# Patient Record
Sex: FEMALE | Race: WHITE | NOT HISPANIC OR LATINO | Employment: FULL TIME | URBAN - METROPOLITAN AREA
[De-identification: names, ages, dates, MRNs, and addresses within clinical notes are randomized per-mention and may not be internally consistent; named-entity substitution may affect disease eponyms.]

---

## 2017-02-01 ENCOUNTER — MYC MEDICAL ADVICE (OUTPATIENT)
Dept: FAMILY MEDICINE | Facility: CLINIC | Age: 42
End: 2017-02-01

## 2017-02-03 DIAGNOSIS — Z98.84 BARIATRIC SURGERY STATUS: ICD-10-CM

## 2017-02-03 LAB
ALBUMIN SERPL-MCNC: 4.1 G/DL (ref 3.4–5)
ALP SERPL-CCNC: 44 U/L (ref 40–150)
ALT SERPL W P-5'-P-CCNC: 20 U/L (ref 0–50)
ANION GAP SERPL CALCULATED.3IONS-SCNC: 8 MMOL/L (ref 3–14)
AST SERPL W P-5'-P-CCNC: 10 U/L (ref 0–45)
BILIRUB SERPL-MCNC: 0.4 MG/DL (ref 0.2–1.3)
BUN SERPL-MCNC: 19 MG/DL (ref 7–30)
CALCIUM SERPL-MCNC: 9 MG/DL (ref 8.5–10.1)
CHLORIDE SERPL-SCNC: 110 MMOL/L (ref 94–109)
CO2 SERPL-SCNC: 28 MMOL/L (ref 20–32)
CREAT SERPL-MCNC: 0.62 MG/DL (ref 0.52–1.04)
DEPRECATED CALCIDIOL+CALCIFEROL SERPL-MC: 31 UG/L (ref 20–75)
FERRITIN SERPL-MCNC: 35 NG/ML (ref 12–150)
GFR SERPL CREATININE-BSD FRML MDRD: ABNORMAL ML/MIN/1.7M2
GLUCOSE SERPL-MCNC: 87 MG/DL (ref 70–99)
HGB BLD-MCNC: 13.5 G/DL (ref 11.7–15.7)
POTASSIUM SERPL-SCNC: 4.3 MMOL/L (ref 3.4–5.3)
PROT SERPL-MCNC: 7 G/DL (ref 6.8–8.8)
PTH-INTACT SERPL-MCNC: 52 PG/ML (ref 12–72)
SODIUM SERPL-SCNC: 146 MMOL/L (ref 133–144)
VIT B12 SERPL-MCNC: 735 PG/ML (ref 193–986)

## 2017-02-03 PROCEDURE — 80053 COMPREHEN METABOLIC PANEL: CPT | Performed by: SURGERY

## 2017-02-03 PROCEDURE — 82607 VITAMIN B-12: CPT | Performed by: SURGERY

## 2017-02-03 PROCEDURE — 85018 HEMOGLOBIN: CPT | Performed by: SURGERY

## 2017-02-03 PROCEDURE — 82306 VITAMIN D 25 HYDROXY: CPT | Performed by: SURGERY

## 2017-02-03 PROCEDURE — 82728 ASSAY OF FERRITIN: CPT | Performed by: SURGERY

## 2017-02-03 PROCEDURE — 36415 COLL VENOUS BLD VENIPUNCTURE: CPT | Performed by: SURGERY

## 2017-02-03 PROCEDURE — 83970 ASSAY OF PARATHORMONE: CPT | Performed by: SURGERY

## 2017-03-20 ENCOUNTER — RADIANT APPOINTMENT (OUTPATIENT)
Dept: GENERAL RADIOLOGY | Facility: CLINIC | Age: 42
End: 2017-03-20
Attending: FAMILY MEDICINE
Payer: COMMERCIAL

## 2017-03-20 ENCOUNTER — OFFICE VISIT (OUTPATIENT)
Dept: ORTHOPEDICS | Facility: CLINIC | Age: 42
End: 2017-03-20
Payer: COMMERCIAL

## 2017-03-20 VITALS
BODY MASS INDEX: 27.75 KG/M2 | DIASTOLIC BLOOD PRESSURE: 78 MMHG | HEIGHT: 62 IN | SYSTOLIC BLOOD PRESSURE: 125 MMHG | HEART RATE: 85 BPM | WEIGHT: 150.8 LBS

## 2017-03-20 DIAGNOSIS — M25.561 ACUTE PAIN OF RIGHT KNEE: Primary | ICD-10-CM

## 2017-03-20 DIAGNOSIS — M25.561 ACUTE PAIN OF RIGHT KNEE: ICD-10-CM

## 2017-03-20 PROCEDURE — 99213 OFFICE O/P EST LOW 20 MIN: CPT | Performed by: FAMILY MEDICINE

## 2017-03-20 PROCEDURE — 73562 X-RAY EXAM OF KNEE 3: CPT | Mod: RT | Performed by: RADIOLOGY

## 2017-03-20 RX ORDER — PREDNISONE 20 MG/1
40 TABLET ORAL DAILY
Qty: 10 TABLET | Refills: 0 | Status: SHIPPED | OUTPATIENT
Start: 2017-03-20 | End: 2017-03-25

## 2017-03-20 ASSESSMENT — PAIN SCALES - GENERAL: PAINLEVEL: SEVERE PAIN (6)

## 2017-03-20 NOTE — MR AVS SNAPSHOT
After Visit Summary   3/20/2017    Yvonne Zarco    MRN: [id number]           Patient Information     Date Of Birth          1975        Visit Information        Provider Department      3/20/2017 12:40 PM Brigido Goss, DO [facility]        Today's Diagnoses     Acute pain of right knee    -  1       Follow-ups after your visit        Additional Services     PHYSICAL THERAPY REFERRAL       Please eval and treat for right knee pain, possible Baker's cyst, popliteus tear / strain, lateral meniscus injury?                  Who to contact     If you have questions or need follow up information about today's clinic visit or your schedule please contact [facility] directly at [phone].  Normal or non-critical lab and imaging results will be communicated to you by Allozynehart, letter or phone within 4 business days after the clinic has received the results. If you do not hear from us within 7 days, please contact the clinic through Allozynehart or phone. If you have a critical or abnormal lab result, we will notify you by phone as soon as possible.  Submit refill requests through Golfsmith or call your pharmacy and they will forward the refill request to us. Please allow 3 business days for your refill to be completed.          Additional Information About Your Visit        MyChart Information     Golfsmith gives you secure access to your electronic health record. If you see a primary care provider, you can also send messages to your care team and make appointments. If you have questions, please call your primary care clinic.  If you do not have a primary care provider, please call [phone] and they will assist you.      Golfsmith is an electronic gateway that provides easy, online access to your medical records. With Golfsmith, you can request a clinic appointment, read your test results, renew a prescription or communicate with your care team.     To access your existing  "account, please contact your [facility] or call [phone] for assistance.        Care EveryWhere ID     This is your Care EveryWhere ID. This could be used by other organizations to access your [facility] medical records  [id number]        Your Vitals Were     Pulse Height BMI (Body Mass Index)             85 1.562 m (5' 1.5\") 28.03 kg/m2          Blood Pressure from Last 3 Encounters:   03/20/17 125/78   09/07/16 120/86   06/15/15 124/71    Weight from Last 3 Encounters:   03/20/17 68.4 kg (150 lb 12.8 oz)   09/07/16 65.8 kg (145 lb)   06/15/15 65 kg (143 lb 3.2 oz)              We Performed the Following     PHYSICAL THERAPY REFERRAL          Today's Medication Changes          These changes are accurate as of: 3/20/17  2:56 PM.  If you have any questions, ask your nurse or doctor.               Start taking these medicines.        Dose/Directions    predniSONE 20 MG tablet   Commonly known as:  DELTASONE   Used for:  Acute pain of right knee   Started by:  Brigido Goss, DO        Dose:  40 mg   Take 2 tablets (40 mg) by mouth daily for 5 days   Quantity:  10 tablet   Refills:  0            Where to get your medicines      These medications were sent to [facility] - [place], [place] - [address], [address]  [address], [address], [facility] [address]     Phone:  [phone]     predniSONE 20 MG tablet                Primary Care Provider Office Phone # Fax #    [facility] [phone]       [address] [address]        Thank you!     Thank you for choosing [facility]  for your care. Our goal is always to provide you with excellent care. Hearing back from our patients is one way we can continue to improve our services. Please take a few minutes to complete the written survey that you may receive in the mail after your visit with us. Thank you!      "        Your Updated Medication List - Protect others around you: Learn how to safely use, store and throw away your medicines at www.disposemymeds.org.          This list is accurate as of: 3/20/17  2:56 PM.  Always use your most recent med list.                   Brand Name Dispense Instructions for use    benzoyl peroxide 5 % Liqd     120 mL    Apply to entire face and then wash off after 5 min daily. Then apply topical clindamycin. May bleach towels.       busPIRone 5 MG tablet    BUSPAR    150 tablet    Start at 5 mg twice daily for 3 days, then 7.5 mg (1.5 tabs) twice daily for 3 days, then 10 mg (2 tabs) twice daily for 3 days, then 12.5 mg (2.5 tabs) twice daily for 3 days, then 15 mg (3 tabs) twice daily and stay at that dose       calcium Citrate-vitamin D 500-400 MG-UNIT Chew     60 tablet    Take 500 mg by mouth 2 times daily       cyanocobalamin 1000 MCG/ML injection    VITAMIN B12    3 mL    Inject 1 mL (1,000 mcg) into the muscle every 30 days       DAILY MULTIVITAMIN PO      Take  by mouth. Take 2 chewable tablets daily       esomeprazole 20 MG CR capsule    nexIUM    30 capsule    Take 1 capsule (20 mg) by mouth 2 times daily Take 30-60 minutes before eating.       order for DME     1 each    Injection Supplies for Vitamin B12: 3cc syringes w/ 27 gauge needles, 1/2 inch length       PARoxetine 40 MG tablet    PAXIL    90 tablet    Take 1 tablet (40 mg) by mouth At Bedtime       predniSONE 20 MG tablet    DELTASONE    10 tablet    Take 2 tablets (40 mg) by mouth daily for 5 days       UNJURY Powd     2 Bottle    Take 21 g by mouth daily Unjury 21 gram-100 kcal/27 gram Powder       varenicline 0.5 MG X 11 & 1 MG X 42 tablet    CHANTIX STARTING MONTH ADRI    53 tablet    Take 0.5 mg tab daily for 3 days, then 0.5 mg tab twice daily for 4 days, then 1 mg twice daily.

## 2017-03-20 NOTE — NURSING NOTE
"Yvonne Zarco's goals for this visit include:   Chief Complaint   Patient presents with     Consult     Right knee pain       She requests these members of her care team be copied on today's visit information: PCP    PCP: Rima Mccollum    Referring Provider:  Referred Self, MD  No address on file    Chief Complaint   Patient presents with     Consult     Right knee pain       Initial /78 (BP Location: Left arm, Patient Position: Chair, Cuff Size: Adult Regular)  Pulse 85  Ht 1.562 m (5' 1.5\")  Wt 68.4 kg (150 lb 12.8 oz)  BMI 28.03 kg/m2 Estimated body mass index is 28.03 kg/(m^2) as calculated from the following:    Height as of this encounter: 1.562 m (5' 1.5\").    Weight as of this encounter: 68.4 kg (150 lb 12.8 oz).  Medication Reconciliation: complete       Medication Refills: none        Kayla Burgos CMA      "

## 2017-03-20 NOTE — PROGRESS NOTES
"HISTORY OF PRESENT ILLNESS  Ms. Zarco is a pleasant 41 year old year old female who presents to clinic today with right knee pain.  Yvonne explains that her pain started about a month ago after walking about 30k steps a day on vacation in [place].  Feels a fullness in the back of the knee, throbs, especially at night.  Moderate to severe..  Stiff after she sits for too long.  Medial knee pain as well, \"cracking,\" swelling in the back of the knee.    No prior knee pain.  Timing: occurs intermittently  Modifying factors:  resting and non-use makes it better, movement and use makes it worse  Associated signs & symptoms: none  Previous similar pain: no  Additional history: as documented    MEDICAL HISTORY  Patient Active Problem List   Diagnosis     GERD (gastroesophageal reflux disease)     Bariatric surgery status     Anxiety     Tobacco use disorder       Current Outpatient Prescriptions   Medication Sig Dispense Refill     cyanocobalamin (VITAMIN B12) 1000 MCG/ML injection Inject 1 mL (1,000 mcg) into the muscle every 30 days 3 mL 3     PARoxetine (PAXIL) 40 MG tablet Take 1 tablet (40 mg) by mouth At Bedtime 90 tablet 3     esomeprazole (NEXIUM) 20 MG capsule Take 1 capsule (20 mg) by mouth 2 times daily Take 30-60 minutes before eating. 30 capsule 0     calcium Citrate-vitamin D 500-400 MG-UNIT CHEW Take 500 mg by mouth 2 times daily 60 tablet prn     Multiple Vitamin (DAILY MULTIVITAMIN PO) Take  by mouth. Take 2 chewable tablets daily       busPIRone (BUSPAR) 5 MG tablet Start at 5 mg twice daily for 3 days, then 7.5 mg (1.5 tabs) twice daily for 3 days, then 10 mg (2 tabs) twice daily for 3 days, then 12.5 mg (2.5 tabs) twice daily for 3 days, then 15 mg (3 tabs) twice daily and stay at that dose (Patient not taking: Reported on 3/20/2017) 150 tablet 0     varenicline (CHANTIX STARTING MONTH PAK) 0.5 MG X 11 & 1 MG X 42 tablet Take 0.5 mg tab daily for 3 days, then 0.5 mg tab twice daily for 4 days, then 1 mg " "twice daily. (Patient not taking: Reported on 3/20/2017) 53 tablet 3     benzoyl peroxide 5 % LIQD Apply to entire face and then wash off after 5 min daily. Then apply topical clindamycin. May bleach towels. (Patient not taking: Reported on 3/20/2017) 120 mL 11     Protein (UNJURY) POWD Take 21 g by mouth daily Unjury 21 gram-100 kcal/27 gram Powder 2 Bottle 11     ORDER FOR DME Injection Supplies for Vitamin B12: 3cc syringes w/ 27 gauge needles, 1/2 inch length (Patient not taking: Reported on 3/20/2017) 1 each 11       Allergies   Allergen Reactions     Ciprofloxacin Itching       Family History   Problem Relation Age of Onset     Hypertension Father      DIABETES Mother      Hypertension Mother      CANCER No family hx of      CEREBROVASCULAR DISEASE No family hx of      Thyroid Disease No family hx of      Glaucoma No family hx of      Macular Degeneration No family hx of      Hypertension Sister      Coronary Artery Disease Mother 67     quadruple bypass     Anxiety Disorder Sister      Anxiety Disorder Daughter      Anxiety Disorder Niece        Additional medical/Social/Surgical histories reviewed in [facility] and updated as appropriate.     REVIEW OF SYSTEMS (3/20/2017)  10 point ROS of systems including Constitutional, Eyes, Respiratory, Cardiovascular, Gastroenterology, Genitourinary, Integumentary, Musculoskeletal, Psychiatric were all negative except for pertinent positives noted in my HPI.     PHYSICAL EXAM  Vitals:    03/20/17 1242   BP: 125/78   BP Location: Left arm   Patient Position: Chair   Cuff Size: Adult Regular   Pulse: 85   Weight: 68.4 kg (150 lb 12.8 oz)   Height: 1.562 m (5' 1.5\")     General  - normal appearance, in no obvious distress  CV  - normal popliteal pulse  Pulm  - normal respiratory pattern, non-labored  Musculoskeletal - right knee  - stance: normal gait without limp, no obvious leg length discrepancy  - inspection: no effusion, no ecchymosis, normal muscle tone, normal bone and " joint alignment, no obvious deformity  - palpation: posterolateral knee tenderness, patella and patellar tendon non-tender, normal popliteal pulse  - ROM: 135 degrees flexion, -5 degrees extension, pain at terminal flexion passively  - strength: 5/5 in flexion, 5/5 in extension  - neuro: no sensory or motor deficit  - special tests:  (-) Lachman  (+) Kaila  (-) varus at 0 and 30 degrees flexion  (-) valgus at 0 and 30 degrees flexion  Neuro  - no sensory or motor deficit, grossly normal coordination, normal muscle tone  Skin  - no ecchymosis, erythema, warmth, or induration, no obvious rash  Psych  - interactive, appropriate, normal mood and affect          ASSESSMENT & PLAN  Ms. Zarco is a 41 year old year old female who is in the office today with right knee pain.    I ordered & reviewed an xray of her knee which shows mild OA.    Yvonne may have suffered a popliteus strain.  Also on my differential is a popliteal cyst or lateral meniscus injury.    We discussed the utility of an MR which, at the moment, would be unlikely to .    I'm referring her to physical therapy.    I recommend that Yvonne return to my office for a re-examination in 4-5 weeks.  She should follow up sooner if the condition worsens or if other problems arise.    It was a pleasure taking care of Yvonne.        Brigdio Goss DO, CAM

## 2017-04-21 ENCOUNTER — RADIANT APPOINTMENT (OUTPATIENT)
Dept: MAMMOGRAPHY | Facility: CLINIC | Age: 42
End: 2017-04-21
Attending: NURSE PRACTITIONER
Payer: COMMERCIAL

## 2017-04-21 DIAGNOSIS — Z12.31 VISIT FOR SCREENING MAMMOGRAM: ICD-10-CM

## 2017-04-21 PROCEDURE — 77063 BREAST TOMOSYNTHESIS BI: CPT

## 2017-04-21 PROCEDURE — G0202 SCR MAMMO BI INCL CAD: HCPCS

## 2017-08-17 ENCOUNTER — MYC MEDICAL ADVICE (OUTPATIENT)
Dept: ORTHOPEDICS | Facility: CLINIC | Age: 42
End: 2017-08-17

## 2017-08-17 DIAGNOSIS — G89.29 CHRONIC PAIN OF RIGHT KNEE: Primary | ICD-10-CM

## 2017-08-17 DIAGNOSIS — M25.561 CHRONIC PAIN OF RIGHT KNEE: Primary | ICD-10-CM

## 2017-08-29 ENCOUNTER — RADIANT APPOINTMENT (OUTPATIENT)
Dept: MRI IMAGING | Facility: CLINIC | Age: 42
End: 2017-08-29
Attending: FAMILY MEDICINE
Payer: COMMERCIAL

## 2017-08-29 DIAGNOSIS — G89.29 CHRONIC PAIN OF RIGHT KNEE: ICD-10-CM

## 2017-08-29 DIAGNOSIS — M25.561 CHRONIC PAIN OF RIGHT KNEE: ICD-10-CM

## 2017-08-29 PROCEDURE — 73721 MRI JNT OF LWR EXTRE W/O DYE: CPT | Mod: RT | Performed by: RADIOLOGY

## 2017-08-31 ENCOUNTER — OFFICE VISIT (OUTPATIENT)
Dept: ORTHOPEDICS | Facility: CLINIC | Age: 42
End: 2017-08-31
Payer: COMMERCIAL

## 2017-08-31 VITALS — OXYGEN SATURATION: 98 % | DIASTOLIC BLOOD PRESSURE: 78 MMHG | SYSTOLIC BLOOD PRESSURE: 122 MMHG | HEART RATE: 87 BPM

## 2017-08-31 DIAGNOSIS — S83.411A SPRAIN OF MEDIAL COLLATERAL LIGAMENT OF RIGHT KNEE, INITIAL ENCOUNTER: Primary | ICD-10-CM

## 2017-08-31 PROCEDURE — 99213 OFFICE O/P EST LOW 20 MIN: CPT | Performed by: FAMILY MEDICINE

## 2017-08-31 RX ORDER — CYCLOBENZAPRINE HCL 10 MG
5 TABLET ORAL
Qty: 30 TABLET | Refills: 1 | Status: SHIPPED | OUTPATIENT
Start: 2017-08-31 | End: 2017-10-25

## 2017-08-31 RX ORDER — TRAMADOL HYDROCHLORIDE 50 MG/1
25 TABLET ORAL EVERY 6 HOURS PRN
Qty: 20 TABLET | Refills: 0 | Status: SHIPPED | OUTPATIENT
Start: 2017-08-31 | End: 2017-10-25

## 2017-08-31 ASSESSMENT — PAIN SCALES - GENERAL: PAINLEVEL: MODERATE PAIN (5)

## 2017-08-31 NOTE — PROGRESS NOTES
HISTORY OF PRESENT ILLNESS  Ms. Zarco is a pleasant 42 year old year old female who presents to clinic today for follow up of her right knee pain.  She's here to review her MRI.  She continues to have medial and lateral knee pain that extends posteriorly.  Additional history: as documented      REVIEW OF SYSTEMS (8/31/2017)  10 point ROS of systems including Constitutional, Eyes, Respiratory, Cardiovascular, Gastroenterology, Genitourinary, Integumentary, Musculoskeletal, Psychiatric were all negative except for pertinent positives noted in my HPI.     PHYSICAL EXAM  Vitals:    08/31/17 0907   BP: 122/78   BP Location: Left arm   Patient Position: Chair   Cuff Size: Adult Regular   Pulse: 87   SpO2: 98%     General  - normal appearance, in no obvious distress  CV  - normal popliteal pulse  Pulm  - normal respiratory pattern, non-labored  Musculoskeletal - right knee  - stance: normal gait  Inspection: trace effusion, normal muscle tone, normal bone and joint alignment  Palpation: tender along medial and lateral aspect of the knee, patella and patellar tendon non-tender, normal popliteal pulse  Strength: 5/5 in flexion, 5/5 in extension  Neuro: no sensory or motor deficit  Special Tests:  (-) Lachman  (-) anterior drawer  (-) posterior drawer  (-) pivot shift  (-) Kaila  (-) varus  Some pain with valgus stress, no laxity    Neuro  - no sensory or motor deficit, grossly normal coordination, normal muscle tone  Skin  - no ecchymosis, erythema, warmth, or induration, no obvious rash  Psych  - interactive, appropriate, normal mood and affect          ASSESSMENT & PLAN  Ms. Zarco is a 42 year old year old female who is in the office today following up with right knee pain.    I reviewed her MRI in the room with her which reads:  1. Mild soft tissue edema surrounding the right knee tibial collateral  ligament, extending posteriorly, nonspecific, possibly representing a  low-grade sprain.  2. No full-thickness cartilage  loss.  3. The anterior and posterior cruciate ligaments, medial and lateral  supporting structures, and bilateral menisci are intact.     I gave Yvonne a hinged knee brace, she should wear this at all times when bearing weight.  She is also going to continue to ice and use heat as helpful.    I prescribed her Flexeril and tramadol, she's having some trouble sleeping due to the pain.    Yvonne should follow-up in 3 or 4 weeks if worsening or not improved.    It was a pleasure taking care of Yvonne.        Brigido Goss DO, CAQSM

## 2017-08-31 NOTE — PATIENT INSTRUCTIONS
Thanks for coming today.  Ortho/Sports Medicine Clinic  [address] [place] [address]    To schedule future appointments in Ortho Clinic, you may call [phone].    To schedule ordered imaging by your provider:   Call Central Imaging [phone]    To schedule an injection ordered by your provider:  Call Central Imaging Injection scheduling line: [phone]  [employer] available online at:  [email or web address]    Please call if any further questions or concerns ([phone]).  Clinic hours 8 am to 5 pm.    Return to clinic (call) if symptoms worsen or fail to improve.

## 2017-08-31 NOTE — NURSING NOTE
"Yvonne Zarco's goals for this visit include: Discuss MRI results of the right knee  She requests these members of her care team be copied on today's visit information: yes    PCP: Rima Mccollum    Referring Provider:  No referring provider defined for this encounter.    Chief Complaint   Patient presents with     Results     MRI right knee from 08/29/2017       Initial /78 (BP Location: Left arm, Patient Position: Chair, Cuff Size: Adult Regular)  Pulse 87  SpO2 98% Estimated body mass index is 28.03 kg/(m^2) as calculated from the following:    Height as of 3/20/17: 1.562 m (5' 1.5\").    Weight as of 3/20/17: 68.4 kg (150 lb 12.8 oz).  Medication Reconciliation: complete    "

## 2017-08-31 NOTE — MR AVS SNAPSHOT
After Visit Summary   2017    Yvonne Zarco    MRN: [id number]           Patient Information     Date Of Birth          1975        Visit Information        Provider Department      2017 9:00 AM Brigido Goss, DO [facility]        Today's Diagnoses     Sprain of medial collateral ligament of right knee, initial encounter    -  1      Care Instructions    Thanks for coming today.  Ortho/Sports Medicine Clinic  [address] [place] [address]    To schedule future appointments in Ortho Clinic, you may call [phone].    To schedule ordered imaging by your provider:   Call Central Imaging [phone]    To schedule an injection ordered by your provider:  Call Central Imaging Injection scheduling line: [phone]  [employer] available online at:  [email or web address]    Please call if any further questions or concerns ([phone]).  Clinic hours 8 am to 5 pm.    Return to clinic (call) if symptoms worsen or fail to improve.            Follow-ups after your visit        Who to contact     If you have questions or need follow up information about today's clinic visit or your schedule please contact [facility] directly at [phone].  Normal or non-critical lab and imaging results will be communicated to you by Web and Rankhart, letter or phone within 4 business days after the clinic has received the results. If you do not hear from us within 7 days, please contact the clinic through Web and Rankhart or phone. If you have a critical or abnormal lab result, we will notify you by phone as soon as possible.  Submit refill requests through [employer] or call your pharmacy and they will forward the refill request to us. Please allow 3 business days for your refill to be completed.          Additional Information About Your Visit        MyChart Information     [employer] gives you secure access to your electronic health record. If you see a primary  care provider, you can also send messages to your care team and make appointments. If you have questions, please call your primary care clinic.  If you do not have a primary care provider, please call [phone] and they will assist you.      [employer] is an electronic gateway that provides easy, online access to your medical records. With [employer], you can request a clinic appointment, read your test results, renew a prescription or communicate with your care team.     To access your existing account, please contact your [facility] or call [phone] for assistance.        Care EveryWhere ID     This is your Care EveryWhere ID. This could be used by other organizations to access your [facility] medical records  [id number]        Your Vitals Were     Pulse Pulse Oximetry                87 98%           Blood Pressure from Last 3 Encounters:   08/31/17 122/78   03/20/17 125/78   09/07/16 120/86    Weight from Last 3 Encounters:   03/20/17 68.4 kg (150 lb 12.8 oz)   09/07/16 65.8 kg (145 lb)   06/15/15 65 kg (143 lb 3.2 oz)              Today, you had the following     No orders found for display         Today's Medication Changes          These changes are accurate as of: 8/31/17  9:26 AM.  If you have any questions, ask your nurse or doctor.               Start taking these medicines.        Dose/Directions    cyclobenzaprine 10 MG tablet   Commonly known as:  FLEXERIL   Used for:  Sprain of medial collateral ligament of right knee, initial encounter        Dose:  5 mg   Take 0.5 tablets (5 mg) by mouth nightly as needed for muscle spasms   Quantity:  30 tablet   Refills:  1       traMADol 50 MG tablet   Commonly known as:  ULTRAM   Used for:  Sprain of medial collateral ligament of right knee, initial encounter        Dose:  25 mg   Take 0.5 tablets (25 mg) by mouth every 6 hours as needed for pain   Quantity:  20 tablet   Refills:  0            Where to get your medicines      These  medications were sent to [facility] - [place], [place] - [address], [address]  [address], [address], [facility] [address]     Phone:  [phone]     cyclobenzaprine 10 MG tablet         Some of these will need a paper prescription and others can be bought over the counter.  Ask your nurse if you have questions.     Bring a paper prescription for each of these medications     traMADol 50 MG tablet                Primary Care Provider Office Phone # Fax #    Rima Carolyn Mccollum, [facility] [phone] [address] [facility]  [facility] [address]        Equal Access to Services     HERLINDA QUEZADA : Hadii patrick paniagua hadasho Soomaali, waaxda luqadaha, qaybta kaalmada adeegyada, surjit lópez . So [facility] [phone].    ATENCIÓN: Si habla español, tiene a javier disposición servicios gratuitos de asistencia lingüística. Llame al [phone].    We comply with applicable federal civil rights laws and [place] laws. We do not discriminate on the basis of race, color, national origin, age, disability sex, sexual orientation or gender identity.            Thank you!     Thank you for choosing [facility]  for your care. Our goal is always to provide you with excellent care. Hearing back from our patients is one way we can continue to improve our services. Please take a few minutes to complete the written survey that you may receive in the mail after your visit with us. Thank you!             Your Updated Medication List - Protect others around you: Learn how to safely use, store and throw away your medicines at www.disposemymeds.org.          This list is accurate as of: 8/31/17  9:26 AM.  Always use your most recent med list.                   Brand Name Dispense Instructions for use Diagnosis    benzoyl peroxide 5 % Liqd     120 mL    Apply to entire face and then wash off after 5 min daily. Then apply topical clindamycin. May bleach  towels.    Acne       busPIRone 5 MG tablet    BUSPAR    150 tablet    Start at 5 mg twice daily for 3 days, then 7.5 mg (1.5 tabs) twice daily for 3 days, then 10 mg (2 tabs) twice daily for 3 days, then 12.5 mg (2.5 tabs) twice daily for 3 days, then 15 mg (3 tabs) twice daily and stay at that dose    Anxiety       calcium Citrate-vitamin D 500-400 MG-UNIT Chew     60 tablet    Take 500 mg by mouth 2 times daily    Unspecified intestinal malabsorption, Status post bariatric surgery       cyanocobalamin 1000 MCG/ML injection    VITAMIN B12    3 mL    Inject 1 mL (1,000 mcg) into the muscle every 30 days    Bariatric surgery status       cyclobenzaprine 10 MG tablet    FLEXERIL    30 tablet    Take 0.5 tablets (5 mg) by mouth nightly as needed for muscle spasms    Sprain of medial collateral ligament of right knee, initial encounter       DAILY MULTIVITAMIN PO      Take  by mouth. Take 2 chewable tablets daily    Bariatric surgery status       esomeprazole 20 MG CR capsule    nexIUM    30 capsule    Take 1 capsule (20 mg) by mouth 2 times daily Take 30-60 minutes before eating.    Gastroesophageal reflux disease, esophagitis presence not specified       order for DME     1 each    Injection Supplies for Vitamin B12: 3cc syringes w/ 27 gauge needles, 1/2 inch length    Bariatric surgery status       PARoxetine 40 MG tablet    PAXIL    90 tablet    Take 1 tablet (40 mg) by mouth At Bedtime    Anxiety       traMADol 50 MG tablet    ULTRAM    20 tablet    Take 0.5 tablets (25 mg) by mouth every 6 hours as needed for pain    Sprain of medial collateral ligament of right knee, initial encounter       UNJURY Powd     2 Bottle    Take 21 g by mouth daily Unjury 21 gram-100 kcal/27 gram Powder    Bariatric surgery status       varenicline 0.5 MG X 11 & 1 MG X 42 tablet    CHANTIX STARTING MONTH ADRI    53 tablet    Take 0.5 mg tab daily for 3 days, then 0.5 mg tab twice daily for 4 days, then 1 mg twice daily.    Tobacco use  disorder

## 2017-10-20 ENCOUNTER — MYC REFILL (OUTPATIENT)
Dept: FAMILY MEDICINE | Facility: CLINIC | Age: 42
End: 2017-10-20

## 2017-10-20 DIAGNOSIS — F41.9 ANXIETY: ICD-10-CM

## 2017-10-20 RX ORDER — PAROXETINE 40 MG/1
40 TABLET, FILM COATED ORAL AT BEDTIME
Qty: 30 TABLET | Refills: 0 | Status: SHIPPED | OUTPATIENT
Start: 2017-10-20 | End: 2017-10-25

## 2017-10-20 NOTE — TELEPHONE ENCOUNTER
Prescription approved per [facility] Refill Protocol.  Patient due for office visit for further refills.  Juliana Christine RN - BC

## 2017-10-20 NOTE — TELEPHONE ENCOUNTER
Message from itembase:  An Wright Oct 20, 2017 10:23 AM        ----- Message -----   From: Yvonne Zarco   Sent: 10/20/2017 10:14 AM   To: [facility] Team Red  Subject: Medication Renewal Request     Original authorizing provider: ISH Zurita CNP would like a refill of the following medications:  PARoxetine (PAXIL) 40 MG tablet [ISH Zurita CNP]    Preferred pharmacy: [facility] [id number] [place] [facility] [place], [place] - [address]    Comment:

## 2017-10-24 NOTE — PATIENT INSTRUCTIONS
[facility]    If you have any questions regarding to your visit please contact your care team:       Team Red:   Clinic Hours Telephone Number   Dr. Ebonie Mccollum, NP   7am-7pm  Monday - Thursday   7am-5pm  Fridays  [phone]  (Appointment scheduling available 24/7)    Questions about your visit?   Team Line  [phone]   [facility] [place] [facility] - 11am-9pm Monday-Friday Saturday-Sunday- 9am-5pm   [place] - 5pm-9pm Monday-Friday Saturday-Sunday- 9am-5pm  [phone] - [place]   [phone] - [place]       What options do I have for visits at the clinic other than the traditional office visit?  To expand how we care for you, many of our providers are utilizing electronic visits (e-visits) and telephone visits, when medically appropriate, for interactions with their patients rather than a visit in the clinic.   We also offer nurse visits for many medical concerns. Just like any other service, we will bill your insurance company for this type of visit based on time spent on the phone with your provider. Not all insurance companies cover these visits. Please check with your medical insurance if this type of visit is covered. You will be responsible for any charges that are not paid by your insurance.      E-visits via [employer]:  generally incur a $35.00 fee.  Telephone visits:  Time spent on the phone: *charged based on time that is spent on the phone in increments of 10 minutes. Estimated cost:   5-10 mins $30.00   11-20 mins. $59.00   21-30 mins. $85.00     Use Mapluckt (secure email communication and access to your chart) to send your primary care provider a message or make an appointment. Ask someone on your Team how to sign up for [employer].  For a Price Quote for your services, please call our Consumer Price Line at [phone].      As always, Thank you for trusting us with your health care needs!    Preventive  Health Recommendations  Female Ages 40 to 49    Yearly exam:     See your health care provider every year in order to  1. Review health changes.   2. Discuss preventive care.    3. Review your medicines if your doctor prescribed any.      Get a Pap test every three years (unless you have an abnormal result and your provider advises testing more often).      If you get Pap tests with HPV test, you only need to test every 5 years, unless you have an abnormal result. You do not need a Pap test if your uterus was removed (hysterectomy) and you have not had cancer.      You should be tested each year for STDs (sexually transmitted diseases), if you're at risk.       Ask your doctor if you should have a mammogram.      Have a colonoscopy (test for colon cancer) if someone in your family has had colon cancer or polyps before age 50.       Have a cholesterol test every 5 years.       Have a diabetes test (fasting glucose) after age 45. If you are at risk for diabetes, you should have this test every 3 years.    Shots: Get a flu shot each year. Get a tetanus shot every 10 years.     Nutrition:     Eat at least 5 servings of fruits and vegetables each day.    Eat whole-grain bread, whole-wheat pasta and brown rice instead of white grains and rice.    Talk to your provider about Calcium and Vitamin D.     Lifestyle    Exercise at least 150 minutes a week (an average of 30 minutes a day, 5 days a week). This will help you control your weight and prevent disease.    Limit alcohol to one drink per day.    No smoking.     Wear sunscreen to prevent skin cancer.    See your dentist every six months for an exam and cleaning.    Discharged by NATHAN Romero

## 2017-10-25 ENCOUNTER — TELEPHONE (OUTPATIENT)
Dept: FAMILY MEDICINE | Facility: CLINIC | Age: 42
End: 2017-10-25

## 2017-10-25 ENCOUNTER — OFFICE VISIT (OUTPATIENT)
Dept: FAMILY MEDICINE | Facility: CLINIC | Age: 42
End: 2017-10-25
Payer: COMMERCIAL

## 2017-10-25 VITALS
HEIGHT: 62 IN | RESPIRATION RATE: 15 BRPM | SYSTOLIC BLOOD PRESSURE: 126 MMHG | WEIGHT: 150.6 LBS | DIASTOLIC BLOOD PRESSURE: 74 MMHG | BODY MASS INDEX: 27.71 KG/M2 | OXYGEN SATURATION: 98 % | HEART RATE: 95 BPM | TEMPERATURE: 98.8 F

## 2017-10-25 DIAGNOSIS — E87.0 HYPERNATREMIA: ICD-10-CM

## 2017-10-25 DIAGNOSIS — K21.9 GASTROESOPHAGEAL REFLUX DISEASE, ESOPHAGITIS PRESENCE NOT SPECIFIED: ICD-10-CM

## 2017-10-25 DIAGNOSIS — L98.9 CHANGING SKIN LESION: ICD-10-CM

## 2017-10-25 DIAGNOSIS — F41.9 ANXIETY: ICD-10-CM

## 2017-10-25 DIAGNOSIS — Z23 NEED FOR PROPHYLACTIC VACCINATION AND INOCULATION AGAINST INFLUENZA: ICD-10-CM

## 2017-10-25 DIAGNOSIS — F17.200 TOBACCO USE DISORDER: ICD-10-CM

## 2017-10-25 DIAGNOSIS — Z00.00 ROUTINE GENERAL MEDICAL EXAMINATION AT A HEALTH CARE FACILITY: Primary | ICD-10-CM

## 2017-10-25 DIAGNOSIS — Z98.84 BARIATRIC SURGERY STATUS: ICD-10-CM

## 2017-10-25 PROCEDURE — 90471 IMMUNIZATION ADMIN: CPT | Performed by: NURSE PRACTITIONER

## 2017-10-25 PROCEDURE — 90686 IIV4 VACC NO PRSV 0.5 ML IM: CPT | Performed by: NURSE PRACTITIONER

## 2017-10-25 PROCEDURE — 80048 BASIC METABOLIC PNL TOTAL CA: CPT | Performed by: NURSE PRACTITIONER

## 2017-10-25 PROCEDURE — 99396 PREV VISIT EST AGE 40-64: CPT | Mod: 25 | Performed by: NURSE PRACTITIONER

## 2017-10-25 PROCEDURE — 36415 COLL VENOUS BLD VENIPUNCTURE: CPT | Performed by: NURSE PRACTITIONER

## 2017-10-25 RX ORDER — BUSPIRONE HYDROCHLORIDE 5 MG/1
TABLET ORAL
Qty: 150 TABLET | Refills: 0 | Status: CANCELLED | OUTPATIENT
Start: 2017-10-25

## 2017-10-25 RX ORDER — PAROXETINE 40 MG/1
20 TABLET, FILM COATED ORAL AT BEDTIME
Qty: 90 TABLET | Refills: 3 | Status: SHIPPED | OUTPATIENT
Start: 2017-10-25 | End: 2018-11-07

## 2017-10-25 RX ORDER — IRON,CARBONYL/FOLIC ACID/MV-MN 30-0.8MG
21 TABLET,CHEWABLE ORAL DAILY
Qty: 2 BOTTLE | Refills: 11 | Status: SHIPPED | OUTPATIENT
Start: 2017-10-25 | End: 2020-01-08

## 2017-10-25 RX ORDER — BUPROPION HYDROCHLORIDE 150 MG/1
TABLET, EXTENDED RELEASE ORAL
Qty: 60 TABLET | Refills: 2 | Status: SHIPPED | OUTPATIENT
Start: 2017-10-25 | End: 2018-04-14

## 2017-10-25 NOTE — MR AVS SNAPSHOT
After Visit Summary   10/25/2017    Yvonne Zarco    MRN: [id number]           Patient Information     Date Of Birth          1975        Visit Information        Provider Department      10/25/2017 4:00 PM Rima Mccollum APRN CNP [facility]        Today's Diagnoses     Routine general medical examination at a health care facility    -  1    Anxiety        Gastroesophageal reflux disease, esophagitis presence not specified        Tobacco use disorder        Need for prophylactic vaccination and inoculation against influenza        Bariatric surgery status        Hypernatremia        Changing skin lesion          Care Instructions    [facility]    If you have any questions regarding to your visit please contact your care team:       Team Red:   Clinic Hours Telephone Number   Dr. Ebonie Mccollum, NP   7am-7pm  Monday - Thursday   7am-5pm  Fridays  [phone]  (Appointment scheduling available 24/7)    Questions about your visit?   Team Line  [phone]   Urgent Care - [place] and [place] - 11am-9pm Monday-Friday Saturday-Sunday- 9am-5pm   [place] - 5pm-9pm Monday-Friday Saturday-Sunday- 9am-5pm  [phone] - [place]   [phone] - [place]       What options do I have for visits at the clinic other than the traditional office visit?  To expand how we care for you, many of our providers are utilizing electronic visits (e-visits) and telephone visits, when medically appropriate, for interactions with their patients rather than a visit in the clinic.   We also offer nurse visits for many medical concerns. Just like any other service, we will bill your insurance company for this type of visit based on time spent on the phone with your provider. Not all insurance companies cover these visits. Please check with your medical insurance if this type of visit is covered. You will be  responsible for any charges that are not paid by your insurance.      E-visits via [employer]:  generally incur a $35.00 fee.  Telephone visits:  Time spent on the phone: *charged based on time that is spent on the phone in increments of 10 minutes. Estimated cost:   5-10 mins $30.00   11-20 mins. $59.00   21-30 mins. $85.00     Use [employer] (secure email communication and access to your chart) to send your primary care provider a message or make an appointment. Ask someone on your Team how to sign up for [employer].  For a Price Quote for your services, please call our [employer] Line at [phone].      As always, Thank you for trusting us with your health care needs!    Preventive Health Recommendations  Female Ages 40 to 49    Yearly exam:     See your health care provider every year in order to  1. Review health changes.   2. Discuss preventive care.    3. Review your medicines if your doctor prescribed any.      Get a Pap test every three years (unless you have an abnormal result and your provider advises testing more often).      If you get Pap tests with HPV test, you only need to test every 5 years, unless you have an abnormal result. You do not need a Pap test if your uterus was removed (hysterectomy) and you have not had cancer.      You should be tested each year for STDs (sexually transmitted diseases), if you're at risk.       Ask your doctor if you should have a mammogram.      Have a colonoscopy (test for colon cancer) if someone in your family has had colon cancer or polyps before age 50.       Have a cholesterol test every 5 years.       Have a diabetes test (fasting glucose) after age 45. If you are at risk for diabetes, you should have this test every 3 years.    Shots: Get a flu shot each year. Get a tetanus shot every 10 years.     Nutrition:     Eat at least 5 servings of fruits and vegetables each day.    Eat whole-grain bread, whole-wheat pasta and brown rice instead of white grains and  "rice.    Talk to your provider about Calcium and Vitamin D.     Lifestyle    Exercise at least 150 minutes a week (an average of 30 minutes a day, 5 days a week). This will help you control your weight and prevent disease.    Limit alcohol to one drink per day.    No smoking.     Wear sunscreen to prevent skin cancer.    See your dentist every six months for an exam and cleaning.    Discharged by NATHAN Romero            Follow-ups after your visit        Who to contact     If you have questions or need follow up information about today's clinic visit or your schedule please contact [facility] directly at [phone].  Normal or non-critical lab and imaging results will be communicated to you by [employer], letter or phone within 4 business days after the clinic has received the results. If you do not hear from us within 7 days, please contact the clinic through [employer] or phone. If you have a critical or abnormal lab result, we will notify you by phone as soon as possible.  Submit refill requests through [employer] or call your pharmacy and they will forward the refill request to us. Please allow 3 business days for your refill to be completed.          Additional Information About Your Visit        [employer] Information     Amorelie gives you secure access to your electronic health record. If you see a primary care provider, you can also send messages to your care team and make appointments. If you have questions, please call your primary care clinic.  If you do not have a primary care provider, please call [phone] and they will assist you.        Care EveryWhere ID     This is your Care EveryWhere ID. This could be used by other organizations to access your [place] medical records  [id number]        Your Vitals Were     Pulse Temperature Respirations Height Pulse Oximetry Breastfeeding?    95 98.8  F (37.1  C) (Oral) 15 5' 1.5\" (1.562 m) 98% No    BMI (Body Mass Index)                   27.99 kg/m2    "         Blood Pressure from Last 3 Encounters:   10/25/17 126/74   08/31/17 122/78   03/20/17 125/78    Weight from Last 3 Encounters:   10/25/17 150 lb 9.6 oz (68.3 kg)   03/20/17 150 lb 12.8 oz (68.4 kg)   09/07/16 145 lb (65.8 kg)              We Performed the Following     Basic metabolic panel          Today's Medication Changes          These changes are accurate as of: 10/25/17  4:48 PM.  If you have any questions, ask your nurse or doctor.               Start taking these medicines.        Dose/Directions    buPROPion 150 MG 12 hr tablet   Commonly known as:  WELLBUTRIN SR   Used for:  Tobacco use disorder   Started by:  Rima Mccollum APRN CNP        Take 1 tablet once daily and increase to 1 tablet twice daily after 4 to 7 days   Quantity:  60 tablet   Refills:  2         These medicines have changed or have updated prescriptions.        Dose/Directions    PARoxetine 40 MG tablet   Commonly known as:  PAXIL   This may have changed:  how much to take   Used for:  Anxiety   Changed by:  Rima Mccollum APRN CNP        Dose:  20 mg   Take 0.5 tablets (20 mg) by mouth At Bedtime   Quantity:  90 tablet   Refills:  3            Where to get your medicines      These medications were sent to [id number]     Phone:  [phone]     [facility] [place]         These medications were sent to [address] [address]     Phone:  [phone]     buPROPion 150 MG 12 hr tablet    esomeprazole 20 MG CR capsule    PARoxetine 40 MG tablet                Primary Care Provider Office Phone # Fax #    ISH Zurita [phone]       [address] [place] [address]        Equal Access to Services     JAGDISH QUEZADA AH: David Ferrer, waaxda luqneal, qaybsurjit bryant  miko trottertank donnapatricia la'aan ah. So [facility] [phone].    ATENCIÓN: Si sheela spear, tiene a javier disposición servicios gratuitos de asistencia lingüística. Masha al [phone].    We comply with applicable federal civil rights laws and [place] laws. We do not discriminate on the basis of race, color, national origin, age, disability, sex, sexual orientation, or gender identity.            Thank you!     Thank you for choosing [facility]  for your care. Our goal is always to provide you with excellent care. Hearing back from our patients is one way we can continue to improve our services. Please take a few minutes to complete the written survey that you may receive in the mail after your visit with us. Thank you!             Your Updated Medication List - Protect others around you: Learn how to safely use, store and throw away your medicines at www.disposemymeds.org.          This list is accurate as of: 10/25/17  4:48 PM.  Always use your most recent med list.                   Brand Name Dispense Instructions for use Diagnosis    benzoyl peroxide 5 % Liqd     120 mL    Apply to entire face and then wash off after 5 min daily. Then apply topical clindamycin. May bleach towels.    Acne       buPROPion 150 MG 12 hr tablet    WELLBUTRIN SR    60 tablet    Take 1 tablet once daily and increase to 1 tablet twice daily after 4 to 7 days    Tobacco use disorder       calcium Citrate-vitamin D 500-400 MG-UNIT Chew     60 tablet    Take 500 mg by mouth 2 times daily    Unspecified intestinal malabsorption, Status post bariatric surgery       cyanocobalamin 1000 MCG/ML injection    VITAMIN B12    3 mL    Inject 1 mL (1,000 mcg) into the muscle every 30 days    Bariatric surgery status       DAILY MULTIVITAMIN PO      Take  by mouth. Take 2 chewable tablets daily    Bariatric surgery status       esomeprazole 20 MG CR capsule    nexIUM    30 capsule    Take 1 capsule (20 mg) by mouth 2 times daily Take  30-60 minutes before eating.    Gastroesophageal reflux disease, esophagitis presence not specified       order for DME     1 each    Injection Supplies for Vitamin B12: 3cc syringes w/ 27 gauge needles, 1/2 inch length    Bariatric surgery status       PARoxetine 40 MG tablet    PAXIL    90 tablet    Take 0.5 tablets (20 mg) by mouth At Bedtime    Anxiety       UNJURY Powd     2 Bottle    Take 21 g by mouth daily Unjury 21 gram-100 kcal/27 gram Powder    Bariatric surgery status

## 2017-10-25 NOTE — NURSING NOTE
"Chief Complaint   Patient presents with     Physical     Recheck Medication     update        Initial /74 (BP Location: Right arm, Patient Position: Chair, Cuff Size: Adult Regular)  Pulse 95  Temp 98.8  F (37.1  C) (Oral)  Resp 15  Ht 5' 1.5\" (1.562 m)  Wt 150 lb 9.6 oz (68.3 kg)  SpO2 98%  Breastfeeding? No  BMI 27.99 kg/m2 Estimated body mass index is 27.99 kg/(m^2) as calculated from the following:    Height as of this encounter: 5' 1.5\" (1.562 m).    Weight as of this encounter: 150 lb 9.6 oz (68.3 kg).  Medication Reconciliation: complete     Chas Valdes      "

## 2017-10-25 NOTE — PROGRESS NOTES
SUBJECTIVE:   CC: Yvonne Zarco is an 42 year old woman who presents for preventive health visit.     Physical   Annual:     Getting at least 3 servings of Calcium per day::  NO    Bi-annual eye exam::  Yes    Dental care twice a year::  Yes    Sleep apnea or symptoms of sleep apnea::  None    Diet::  Other    Frequency of exercise::  2-3 days/week    Duration of exercise::  15-30 minutes    Taking medications regularly::  Yes    Medication side effects::  Not applicable    Concerns: none       Today's PHQ-2 Score: PHQ-2 ( 1999 Pfizer) 10/20/2017   Q1: Little interest or pleasure in doing things 0   Q2: Feeling down, depressed or hopeless 0   PHQ-2 Score 0   Q1: Little interest or pleasure in doing things Not at all   Q2: Feeling down, depressed or hopeless Not at all   PHQ-2 Score 0       Abuse: Current or Past(Physical, Sexual or Emotional)- No  Do you feel safe in your environment - Yes    Social History   Substance Use Topics     Smoking status: Current Every Day Smoker     Packs/day: 0.50     Years: 25.00     Types: Cigarettes     Smokeless tobacco: Never Used      Comment: On chantix     Alcohol use No     The patient does not drink >3 drinks per day nor >7 drinks per week.    Reviewed orders with patient.  Reviewed health maintenance and updated orders accordingly - Yes  Chas Valdes    Labs reviewed in EPIC    Patient under age 50, mutual decision reflected in health maintenance.        Pertinent mammograms are reviewed under the imaging tab.  History of abnormal Pap smear: Status post benign hysterectomy. Health Maintenance and Surgical History updated.    Reviewed and updated as needed this visit by clinical staff         Reviewed and updated as needed this visit by Provider          Review of Systems  C: NEGATIVE for fever, chills, change in weight  I: Several moles- the one on the thigh is itchy and peeling off on occasion  E: NEGATIVE for vision changes or irritation  ENT: NEGATIVE for ear,  "mouth and throat problems  R: NEGATIVE for significant cough or SOB  B: NEGATIVE for masses, tenderness or discharge  CV: NEGATIVE for chest pain, palpitations or peripheral edema  GI: History of GERD and s/p hiatal hernia repair, stable on nexium  : NEGATIVE for unusual urinary or vaginal symptoms. Periods are regular.  M: NEGATIVE for significant arthralgias or myalgia  N: NEGATIVE for weakness, dizziness or paresthesias  PSYCHIATRIC: History of anxiety well controlled on Paxil     OBJECTIVE:   /74 (BP Location: Right arm, Patient Position: Chair, Cuff Size: Adult Regular)  Pulse 95  Temp 98.8  F (37.1  C) (Oral)  Resp 15  Ht 5' 1.5\" (1.562 m)  Wt 150 lb 9.6 oz (68.3 kg)  SpO2 98%  Breastfeeding? No  BMI 27.99 kg/m2  Physical Exam  GENERAL: healthy, alert and no distress  EYES: Eyes grossly normal to inspection, PERRL and conjunctivae and sclerae normal  HENT: ear canals and TM's normal, nose and mouth without ulcers or lesions  NECK: no adenopathy, no asymmetry, masses, or scars and thyroid normal to palpation  RESP: lungs clear to auscultation - no rales, rhonchi or wheezes  BREAST: normal without masses, tenderness or nipple discharge and no palpable axillary masses or adenopathy  CV: regular rate and rhythm, normal S1 S2, no S3 or S4, no murmur, click or rub, no peripheral edema and peripheral pulses strong  ABDOMEN: soft, nontender, no hepatosplenomegaly, no masses and bowel sounds normal  MS: no gross musculoskeletal defects noted, no edema  SKIN: 5 mm pink smooth papule right thigh, smaller lesions left upper extremity, right upper extremity, left medial thigh  NEURO: Normal strength and tone, mentation intact and speech normal  PSYCH: mentation appears normal, affect normal/bright    ASSESSMENT/PLAN:   1. Routine general medical examination at a health care facility      2. Anxiety  Stable, continue meds without change  - PARoxetine (PAXIL) 40 MG tablet; Take 0.5 tablets (20 mg) by mouth At " "Bedtime  Dispense: 90 tablet; Refill: 3    3. Gastroesophageal reflux disease, esophagitis presence not specified  Stable on medications, continue without change  - esomeprazole (NEXIUM) 20 MG CR capsule; Take 1 capsule (20 mg) by mouth 2 times daily Take 30-60 minutes before eating.  Dispense: 30 capsule; Refill: 0    4. Tobacco use disorder  Side effects on Chantix- will try Zyban. Counseled regarding cessation strategies  - buPROPion (WELLBUTRIN SR) 150 MG 12 hr tablet; Take 1 tablet once daily and increase to 1 tablet twice daily after 4 to 7 days  Dispense: 60 tablet; Refill: 2    5. Need for prophylactic vaccination and inoculation against influenza    - FLU VACCINE, 3 YRS +, IM (QUADRIVALENT W/PRESERVATIVES/MULTI-DOSE) [48285]  - Vaccine Administration, Initial [60110]    6. Bariatric surgery status  Follows with bariatric surgeon  - Protein (UNJURY) POWD; Take 21 g by mouth daily Unjury 21 gram-100 kcal/27 gram Powder  Dispense: 2 Bottle; Refill: 11    7. Hypernatremia    - Basic metabolic panel    8. Changing skin lesion  Suspect dermatofibroma but recommend biopsy of largest lesion due to itching     COUNSELING:  Reviewed preventive health counseling, as reflected in patient instructions       Regular exercise       Healthy diet/nutrition       Osteoporosis Prevention/Bone Health         reports that she has been smoking Cigarettes.  She has a 12.50 pack-year smoking history. She has never used smokeless tobacco.  Tobacco Cessation Action Plan: Pharmacotherapies : Zyban/Wellbutrin  Estimated body mass index is 28.03 kg/(m^2) as calculated from the following:    Height as of 3/20/17: 5' 1.5\" (1.562 m).    Weight as of 3/20/17: 150 lb 12.8 oz (68.4 kg).   Weight management plan: Patient referred to endocrine and/or weight management specialty    Counseling Resources:  ATP IV Guidelines  Pooled Cohorts Equation Calculator  Breast Cancer Risk Calculator  FRAX Risk Assessment  ICSI Preventive " Guidelines  Dietary Guidelines for Americans, 2010  USDA's MyPlate  ASA Prophylaxis  Lung CA Screening    ISH Zurita [facility] [place]  Answers for HPI/ROS submitted by the patient on 10/20/2017   PHQ-2 Score: 0    Injectable Influenza Immunization Documentation    1.  Is the person to be vaccinated sick today?   No    2. Does the person to be vaccinated have an allergy to a component   of the vaccine?   No  Egg Allergy Algorithm Link    3. Has the person to be vaccinated ever had a serious reaction   to influenza vaccine in the past?   No    4. Has the person to be vaccinated ever had Guillain-Barré syndrome?   No    Form completed by Makeda Ferris CMA ([facility])

## 2017-10-25 NOTE — TELEPHONE ENCOUNTER
Reason for Call:  Other prescription    Detailed comments: Deepika is wondering if s omeprazole script can instead be a script for omeprazole due to price as insurance does not cover the S omeprazole. Please call to discuss.     Phone Number Patient can be reached at: Other phone number: [phone]    Best Time: any    Can we leave a detailed message on this number? YES    Call taken on 10/25/2017 at 5:09 PM by Wenceslao Singh

## 2017-10-26 ENCOUNTER — MYC MEDICAL ADVICE (OUTPATIENT)
Dept: FAMILY MEDICINE | Facility: CLINIC | Age: 42
End: 2017-10-26

## 2017-10-26 DIAGNOSIS — K21.9 GASTROESOPHAGEAL REFLUX DISEASE, ESOPHAGITIS PRESENCE NOT SPECIFIED: ICD-10-CM

## 2017-10-26 LAB
ANION GAP SERPL CALCULATED.3IONS-SCNC: 2 MMOL/L (ref 3–14)
BUN SERPL-MCNC: 17 MG/DL (ref 7–30)
CALCIUM SERPL-MCNC: 8.4 MG/DL (ref 8.5–10.1)
CHLORIDE SERPL-SCNC: 110 MMOL/L (ref 94–109)
CO2 SERPL-SCNC: 31 MMOL/L (ref 20–32)
CREAT SERPL-MCNC: 0.69 MG/DL (ref 0.52–1.04)
GFR SERPL CREATININE-BSD FRML MDRD: >90 ML/MIN/1.7M2
GLUCOSE SERPL-MCNC: 77 MG/DL (ref 70–99)
POTASSIUM SERPL-SCNC: 4.4 MMOL/L (ref 3.4–5.3)
SODIUM SERPL-SCNC: 143 MMOL/L (ref 133–144)

## 2017-11-16 ENCOUNTER — OFFICE VISIT (OUTPATIENT)
Dept: OPHTHALMOLOGY | Facility: CLINIC | Age: 42
End: 2017-11-16
Payer: COMMERCIAL

## 2017-11-16 DIAGNOSIS — T26.91XA CHEMICAL INSULT, EYE, RIGHT, INITIAL ENCOUNTER: Primary | ICD-10-CM

## 2017-11-16 PROCEDURE — 92002 INTRM OPH EXAM NEW PATIENT: CPT | Performed by: OPHTHALMOLOGY

## 2017-11-16 ASSESSMENT — VISUAL ACUITY
OS_CC: 20/20
METHOD: SNELLEN - LINEAR
OD_CC: 20/25

## 2017-11-16 ASSESSMENT — SLIT LAMP EXAM - LIDS
COMMENTS: NORMAL
COMMENTS: NORMAL

## 2017-11-16 NOTE — MR AVS SNAPSHOT
After Visit Summary   11/16/2017    Yvonne Zarco    MRN: [id number]           Patient Information     Date Of Birth          1975        Visit Information        Provider Department      11/16/2017 9:00 AM Flip Gar MD [facility]        Care Instructions    Continue Tobramycin three times daily thru the end of the weekend right eye   Continue Genteal drops  Cold packs twice daily   Return visit as needed     Flip Gar M.D.            Follow-ups after your visit        Your next 10 appointments already scheduled     Nov 29, 2017  4:40 PM CST   Office Visit with ISH Zurita CNP   [facility] ([facility])    [address] [address] [phone]           Bring a current list of meds and any records pertaining to this visit. For Physicals, please bring immunization records and any forms needing to be filled out. Please arrive 10 minutes early to complete paperwork.              Who to contact     If you have questions or need follow up information about today's clinic visit or your schedule please contact [facility] directly at [phone].  Normal or non-critical lab and imaging results will be communicated to you by MyChart, letter or phone within 4 business days after the clinic has received the results. If you do not hear from us within 7 days, please contact the clinic through nap- Naturally Attached Parentshart or phone. If you have a critical or abnormal lab result, we will notify you by phone as soon as possible.  Submit refill requests through [email or web address] or call your pharmacy and they will forward the refill request to us. Please allow 3 business days for your refill to be completed.          Additional Information About Your Visit        MyChart Information     [email or web address] gives you secure access to your electronic health record. If you see a primary care provider, you can also send messages to your care team and  make appointments. If you have questions, please call your primary care clinic.  If you do not have a primary care provider, please call [phone] and they will assist you.        Care EveryWhere ID     This is your Care EveryWhere ID. This could be used by other organizations to access your [place] medical records  [id number]         Blood Pressure from Last 3 Encounters:   10/25/17 126/74   08/31/17 122/78   03/20/17 125/78    Weight from Last 3 Encounters:   10/25/17 68.3 kg (150 lb 9.6 oz)   03/20/17 68.4 kg (150 lb 12.8 oz)   09/07/16 65.8 kg (145 lb)              Today, you had the following     No orders found for display       Primary Care Provider Office Phone # Fax #    Rima ISH Reyes [facility] [phone]       [address] [address]        Equal Access to Services     HERLINDA QUEZADA : Hadii aad ku hadasho Soomaali, waaxda luqadaha, qaybta kaalmada adeegyada, waxay idiin hayaan erica khpatricia lópez . So [facility] [phone].    ATENCIÓN: Si habla español, tiene a javier disposición servicios gratuitos de asistencia lingüística. Llame al [phone].    We comply with applicable federal civil rights laws and [place] laws. We do not discriminate on the basis of race, color, national origin, age, disability, sex, sexual orientation, or gender identity.            Thank you!     Thank you for choosing [facility]  for your care. Our goal is always to provide you with excellent care. Hearing back from our patients is one way we can continue to improve our services. Please take a few minutes to complete the written survey that you may receive in the mail after your visit with us. Thank you!             Your Updated Medication List - Protect others around you: Learn how to safely use, store and throw away your medicines at www.disposemymeds.org.          This list is accurate as of: 11/16/17  9:48 AM.  Always use your most recent med list.                    Brand Name Dispense Instructions for use Diagnosis    benzoyl peroxide 5 % Liqd     120 mL    Apply to entire face and then wash off after 5 min daily. Then apply topical clindamycin. May bleach towels.    Acne       buPROPion 150 MG 12 hr tablet    WELLBUTRIN SR    60 tablet    Take 1 tablet once daily and increase to 1 tablet twice daily after 4 to 7 days    Tobacco use disorder       calcium Citrate-vitamin D 500-400 MG-UNIT Chew     60 tablet    Take 500 mg by mouth 2 times daily    Unspecified intestinal malabsorption, Status post bariatric surgery       cyanocobalamin 1000 MCG/ML injection    VITAMIN B12    3 mL    Inject 1 mL (1,000 mcg) into the muscle every 30 days    Bariatric surgery status       DAILY MULTIVITAMIN PO      Take  by mouth. Take 2 chewable tablets daily    Bariatric surgery status       esomeprazole 20 MG CR capsule    nexIUM    84 capsule    Take 1 capsule (20 mg) by mouth 2 times daily Take 30-60 minutes before eating.    Gastroesophageal reflux disease, esophagitis presence not specified       order for DME     1 each    Injection Supplies for Vitamin B12: 3cc syringes w/ 27 gauge needles, 1/2 inch length    Bariatric surgery status       PARoxetine 40 MG tablet    PAXIL    90 tablet    Take 0.5 tablets (20 mg) by mouth At Bedtime    Anxiety       UNJURY Powd     2 Bottle    Take 21 g by mouth daily Unjury 21 gram-100 kcal/27 gram Powder    Bariatric surgery status

## 2017-11-16 NOTE — PATIENT INSTRUCTIONS
Continue Tobramycin three times daily thru the end of the weekend right eye.   Continue Genteal drops.  Cold packs twice daily.   Return visit as needed.     Flip Gar M.D.

## 2017-11-16 NOTE — PROGRESS NOTES
Current Eye Medications:  Tobramycin every 4 hours right eye. Last @ 8am      Subjective:  Follow up from ER 11/15. Patient accidentally put compound W in right eye (thought it was  genteal) ER flushed right eye with IV solution and gave antibiotic (Tobramycin). Red, Gritty,scratchy, goopy right eye. NO VA concerns. No flashes and floaters.      Objective:  See Ophthalmology Exam.       Assessment:    Chemical ocular injury right eye - mild.      Plan:  Continue Tobramycin three times daily thru the end of the weekend right eye.   Continue Genteal drops.  Cold packs twice daily.   Return visit as needed.     Flip Gar M.D.

## 2018-01-08 ENCOUNTER — RADIANT APPOINTMENT (OUTPATIENT)
Dept: GENERAL RADIOLOGY | Facility: CLINIC | Age: 43
End: 2018-01-08
Attending: PEDIATRICS
Payer: COMMERCIAL

## 2018-01-08 ENCOUNTER — OFFICE VISIT (OUTPATIENT)
Dept: ORTHOPEDICS | Facility: CLINIC | Age: 43
End: 2018-01-08
Payer: COMMERCIAL

## 2018-01-08 VITALS
HEIGHT: 61 IN | BODY MASS INDEX: 29.04 KG/M2 | DIASTOLIC BLOOD PRESSURE: 80 MMHG | WEIGHT: 153.8 LBS | SYSTOLIC BLOOD PRESSURE: 129 MMHG | HEART RATE: 96 BPM

## 2018-01-08 DIAGNOSIS — S89.92XA INJURY OF LEFT KNEE, INITIAL ENCOUNTER: ICD-10-CM

## 2018-01-08 DIAGNOSIS — S79.912A INJURY OF LEFT HIP, INITIAL ENCOUNTER: ICD-10-CM

## 2018-01-08 DIAGNOSIS — M25.552 PAIN OF LEFT HIP JOINT: Primary | ICD-10-CM

## 2018-01-08 DIAGNOSIS — M25.562 ACUTE PAIN OF LEFT KNEE: ICD-10-CM

## 2018-01-08 PROCEDURE — 99203 OFFICE O/P NEW LOW 30 MIN: CPT | Performed by: PEDIATRICS

## 2018-01-08 PROCEDURE — 73502 X-RAY EXAM HIP UNI 2-3 VIEWS: CPT

## 2018-01-08 PROCEDURE — 73562 X-RAY EXAM OF KNEE 3: CPT

## 2018-01-08 NOTE — PROGRESS NOTES
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    Injury: Patient reports a fall a couple weeks ago 12/24/17.  She fell onto her left side.  Had some pain then, however, noticed worsening groin pain and some episodes of sharp pain and weakness in that leg when playing boot hockey and ice fishing over the next 2 weeks.  Pain is in her groin and shoots down to her knee, worse with getting up from seated position and stairs.  - History of low back pain, not much currently.  No numbness or tingling    Location of Pain: lateral and anterior hip, lateral thigh, lateral knee, weakness in left hip movements  Duration of Pain: 2 week(s)  Rating of Pain at worst: 5/10  Rating of Pain Currently: 1/10  Symptoms are better with: Other medications: flexeril, excedrin  Symptoms are worse with: standing up, stairs, with resistance to hip motions  Additional Features:   Positive: bruising, weakness   Negative: swelling, popping, grinding, catching, locking, instability, paresthesias, numbness, pain in other joints and systemic symptoms  Other evaluation and/or treatments so far consists of: No Treatment tried to date  Prior History of related problems: falls once a month over last year or so.    Social History: desk job    Review of Systems  Skin: yes lateral hip bruising, no swelling  Musculoskeletal: as above  Neurologic: no numbness, paresthesias  Remainder of review of systems is negative including constitutional, CV, pulmonary, GI, except as noted in HPI or medical history.    Patient's current problem list, past medical and surgical history, and family history were reviewed.    Patient Active Problem List   Diagnosis     GERD (gastroesophageal reflux disease)     Bariatric surgery status     Anxiety     Tobacco use disorder     Past Medical History:   Diagnosis Date     Depressive disorder forever      "Gastro-oesophageal reflux disease      Hiatal hernia      Hypertension      Past Surgical History:   Procedure Laterality Date      SECTION      x2     CHOLECYSTECTOMY       ESOPHAGOSCOPY, GASTROSCOPY, DUODENOSCOPY (EGD), COMBINED  2012    Procedure: COMBINED ESOPHAGOSCOPY, GASTROSCOPY, DUODENOSCOPY (EGD), BIOPSY SINGLE OR MULTIPLE;;  Surgeon: Scott Seth MD;  Location:  GI     GENITOURINARY SURGERY  ??    bladder sling with hysterectomy early      GYN SURGERY      tubal ligation     hysterectomy with bladder sling      left ovaries     HYSTERECTOMY, PAP NO LONGER INDICATED       LAPAROSCOPIC GASTRIC SLEEVE  5/10/2013    Procedure: LAPAROSCOPIC GASTRIC SLEEVE;  Laparoscopic Sleeve Gastrectomy with hiatal hernia repair;  Surgeon: Scott Seth MD;  Location:  OR     [facility] BILATERAL       ORTHOPEDIC SURGERY      right foot surgery     Family History   Problem Relation Age of Onset     Hypertension Father           DIABETES Mother      diet controlled     Hypertension Mother      meds     Coronary Artery Disease Mother 67     quadruple bypass     Hypertension Sister      Anxiety Disorder Sister      Anxiety Disorder Daughter      Anxiety Disorder Niece      Hypertension Sister      meds     Anxiety Disorder Sister      CANCER No family hx of      CEREBROVASCULAR DISEASE No family hx of      Thyroid Disease No family hx of      Glaucoma No family hx of      Macular Degeneration No family hx of        Objective  /80  Pulse 96  Ht 5' 1.25\" (1.556 m)  Wt 153 lb 12.8 oz (69.8 kg)  BMI 28.82 kg/m2    GENERAL APPEARANCE: healthy, alert and no distress   GAIT: NORMAL  SKIN: no suspicious lesions or rashes  HEENT: Sclera clear, anicteric  CV: good peripheral pulses  RESP: Breathing not labored  NEURO: Normal strength and tone, mentation intact and speech normal  PSYCH:  mentation appears normal and affect normal/bright    Bilateral hip exam    Inspection:      " ecchymosis mild lateral thigh    Tender:      none bilateral    Non Tender:      remainder of the hip area bilateral    ROM:     Full active and passive ROM  bilateral    Strength:      flexion 4/5 left       abduction 5/5 bilateral       adduction 5/5 bilateral    Sensation:      grossly intact in hip and thigh    Special Tests:      neg (-) WILL left       neg (-) FADIR left       neg (-) scour left      Bilateral Knee exam  Inspection:      One spot of lateral bruising left    Patella:      Normal patellar tracking noted through range of motion bilateral    Tender:      none    Non Tender:      remainder of knee area bilateral    Knee ROM:      Full active and passive ROM with flexion and extension bilateral    Hip ROM:     Full active and passive ROM bilateral    Strength:      5/5 with knee extension bilateral    Special Tests:     neg (-) Kaila left       neg (-) Lachmans left       neg (-) anterior drawer left       neg (-) posterior drawer left       neg (-) varus at 0 deg and 30 deg left       neg (-) valgus at 0 deg and 30 deg left    Neurovascular:      2+ peripheral pulses bilaterally and brisk capillary refill       sensation grossly intact    Radiology  I ordered, visualized and reviewed these images with the patient  PELVIS AND LEFT HIP ONE VIEW  1/8/2018 1:46 PM  HISTORY:  Left hip pain after injury.  COMPARISON:  None.  FINDINGS:  No fracture or osseous lesion is seen. The joint spaces are  well preserved. No adjacent soft tissue pathology is seen.  IMPRESSION:  Unremarkable examination.    AP and sunrise bilateral and left lateral XR views of knees reviewed: no acute bony abnormality, no significant degenerative change  - will follow official read    Assessment:  1. Pain of left hip joint    2. Acute pain of left knee      Likely functional pain, hip flexor strain related to fall.  We discussed continued supportive care and physical therapy.  Less likely lumbar etiology.  Would consider imaging  pending clinical course.    Plan:  - Today's Plan of Care:  Rehab: Physical Therapy: [facility] - [phone]    -We also discussed other future treatment options:  MRI    Follow Up: 1 month    Concerning signs and symptoms were reviewed.  The patient expressed understanding of this management plan and all questions were answered at this time.    Maritza Morin MD CAQ  Primary Care Sports Medicine  [facility]

## 2018-01-08 NOTE — MR AVS SNAPSHOT
After Visit Summary   1/8/2018    Yvonne aZrco    MRN: [id number]           Patient Information     Date Of Birth          1975        Visit Information        Provider Department      1/8/2018 1:00 PM Maritza Morin MD [facility]        Today's Diagnoses     Pain of left hip joint    -  1    Injury of left knee, initial encounter          Care Instructions    Plan:  - Today's Plan of Care:  Rehab: Physical Therapy: [place] [facility] - [phone]    -We also discussed other future treatment options:  MRI    Follow Up: 1 month              Follow-ups after your visit        Additional Services     [facility] PT, HAND, AND CHIROPRACTIC REFERRAL       **This order will print in the [facility] Scheduling Office**    Physical Therapy, Hand Therapy and Chiropractic Care are available through:    *[facility]  *[facility]  *[facility]    Call one number to schedule at any of the above locations: [phone].    Your provider has referred you to: Physical Therapy at [facility] or [facility]    Indication/Reason for Referral: Hip Pain and Knee Pain  Onset of Illness: ~2 weeks  Therapy Orders: Evaluate and Treat  Special Programs: None  Special Request: None    Additional Comments for the Therapist or Chiropractor: None    Please be aware that coverage of these services is subject to the terms and limitations of your health insurance plan.  Call member services at your health plan with any benefit or coverage questions.      Please bring the following to your appointment:    *Your personal calendar for scheduling future appointments  *Comfortable clothing                  Who to contact     If you have questions or need follow up information about today's clinic visit or your schedule please contact [facility] directly at [phone].  Normal or non-critical lab and imaging results will be  "communicated to you by AnyCloudhart, letter or phone within 4 business days after the clinic has received the results. If you do not hear from us within 7 days, please contact the clinic through VALOREM or phone. If you have a critical or abnormal lab result, we will notify you by phone as soon as possible.  Submit refill requests through VALOREM or call your pharmacy and they will forward the refill request to us. Please allow 3 business days for your refill to be completed.          Additional Information About Your Visit        VALOREM Information     VALOREM gives you secure access to your electronic health record. If you see a primary care provider, you can also send messages to your care team and make appointments. If you have questions, please call your primary care clinic.  If you do not have a primary care provider, please call [phone] and they will assist you.        Care EveryWhere ID     This is your Care EveryWhere ID. This could be used by other organizations to access your [place] medical records  [id number]        Your Vitals Were     Pulse Height BMI (Body Mass Index)             96 5' 1.25\" (1.556 m) 28.82 kg/m2          Blood Pressure from Last 3 Encounters:   01/08/18 129/80   10/25/17 126/74   08/31/17 122/78    Weight from Last 3 Encounters:   01/08/18 153 lb 12.8 oz (69.8 kg)   10/25/17 150 lb 9.6 oz (68.3 kg)   03/20/17 150 lb 12.8 oz (68.4 kg)              We Performed the Following     [facility] PT, HAND, AND CHIROPRACTIC REFERRAL        Primary Care Provider Office Phone # Fax #    Rima Mccollum, [facility] [phone] [address] [address]        Equal Access to Services     : Hadii patrick Ferrer, wajaquida luqadaha, qaybta kaalmada shaka, surjit telles. So [facility] [phone].    ATENCIÓN: Si habla español, tiene a javier disposición servicios gratuitos de asistencia lingüística. Llame al " [phone].    We comply with applicable federal civil rights laws and [place] laws. We do not discriminate on the basis of race, color, national origin, age, disability, sex, sexual orientation, or gender identity.            Thank you!     Thank you for choosing [facility]  for your care. Our goal is always to provide you with excellent care. Hearing back from our patients is one way we can continue to improve our services. Please take a few minutes to complete the written survey that you may receive in the mail after your visit with us. Thank you!             Your Updated Medication List - Protect others around you: Learn how to safely use, store and throw away your medicines at www.disposemymeds.org.          This list is accurate as of: 1/8/18  1:43 PM.  Always use your most recent med list.                   Brand Name Dispense Instructions for use Diagnosis    benzoyl peroxide 5 % Liqd     120 mL    Apply to entire face and then wash off after 5 min daily. Then apply topical clindamycin. May bleach towels.    Acne       buPROPion 150 MG 12 hr tablet    WELLBUTRIN SR    60 tablet    Take 1 tablet once daily and increase to 1 tablet twice daily after 4 to 7 days    Tobacco use disorder       calcium Citrate-vitamin D 500-400 MG-UNIT Chew     60 tablet    Take 500 mg by mouth 2 times daily    Unspecified intestinal malabsorption, Status post bariatric surgery       cyanocobalamin 1000 MCG/ML injection    VITAMIN B12    3 mL    Inject 1 mL (1,000 mcg) into the muscle every 30 days    Bariatric surgery status       DAILY MULTIVITAMIN PO      Take  by mouth. Take 2 chewable tablets daily    Bariatric surgery status       esomeprazole 20 MG CR capsule    nexIUM    84 capsule    Take 1 capsule (20 mg) by mouth 2 times daily Take 30-60 minutes before eating.    Gastroesophageal reflux disease, esophagitis presence not specified       FLEXERIL PO           order for DME     1 each     Injection Supplies for Vitamin B12: 3cc syringes w/ 27 gauge needles, 1/2 inch length    Bariatric surgery status       PARoxetine 40 MG tablet    PAXIL    90 tablet    Take 0.5 tablets (20 mg) by mouth At Bedtime    Anxiety       UNJURY Powd     2 Bottle    Take 21 g by mouth daily Unjury 21 gram-100 kcal/27 gram Powder    Bariatric surgery status

## 2018-01-08 NOTE — PATIENT INSTRUCTIONS
Plan:  - Today's Plan of Care:  Rehab: Physical Therapy: [facility] - [phone]    -We also discussed other future treatment options:  MRI    Follow Up: 1 month

## 2018-01-08 NOTE — LETTER
1/8/2018         RE: Yvonne Zarco  [address]  [facility]  [place] [address]        Dear Colleague,    Thank you for referring your patient, Yvonne Zarco, to the [facility] [place]. Please see a copy of my visit note below.    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    Injury: Patient reports a fall a couple weeks ago 12/24/17.  She fell onto her left side.  Had some pain then, however, noticed worsening groin pain and some episodes of sharp pain and weakness in that leg when playing boot hockey and ice fishing over the next 2 weeks.  Pain is in her groin and shoots down to her knee, worse with getting up from seated position and stairs.  - History of low back pain, not much currently.  No numbness or tingling    Location of Pain: lateral and anterior hip, lateral thigh, lateral knee, weakness in left hip movements  Duration of Pain: 2 week(s)  Rating of Pain at worst: 5/10  Rating of Pain Currently: 1/10  Symptoms are better with: Other medications: flexeril, excedrin  Symptoms are worse with: standing up, stairs, with resistance to hip motions  Additional Features:   Positive: bruising, weakness   Negative: swelling, popping, grinding, catching, locking, instability, paresthesias, numbness, pain in other joints and systemic symptoms  Other evaluation and/or treatments so far consists of: No Treatment tried to date  Prior History of related problems: falls once a month over last year or so.    Social History: desk job    Review of Systems  Skin: yes lateral hip bruising, no swelling  Musculoskeletal: as above  Neurologic: no numbness, paresthesias  Remainder of review of systems is negative including constitutional, CV, pulmonary, GI, except as noted in HPI or medical history.    Patient's current problem list, past medical and surgical  "history, and family history were reviewed.    Patient Active Problem List   Diagnosis     GERD (gastroesophageal reflux disease)     Bariatric surgery status     Anxiety     Tobacco use disorder     Past Medical History:   Diagnosis Date     Depressive disorder forever     Gastro-oesophageal reflux disease      Hiatal hernia      Hypertension      Past Surgical History:   Procedure Laterality Date      SECTION      x2     CHOLECYSTECTOMY       ESOPHAGOSCOPY, GASTROSCOPY, DUODENOSCOPY (EGD), COMBINED  2012    Procedure: COMBINED ESOPHAGOSCOPY, GASTROSCOPY, DUODENOSCOPY (EGD), BIOPSY SINGLE OR MULTIPLE;;  Surgeon: Scott Seth MD;  Location:  GI     GENITOURINARY SURGERY  ??    bladder sling with hysterectomy early      GYN SURGERY      tubal ligation     hysterectomy with bladder sling      left ovaries     HYSTERECTOMY, PAP NO LONGER INDICATED       LAPAROSCOPIC GASTRIC SLEEVE  5/10/2013    Procedure: LAPAROSCOPIC GASTRIC SLEEVE;  Laparoscopic Sleeve Gastrectomy with hiatal hernia repair;  Surgeon: Scott Seth MD;  Location:  OR     [facility] BILATERAL       ORTHOPEDIC SURGERY      right foot surgery     Family History   Problem Relation Age of Onset     Hypertension Father           DIABETES Mother      diet controlled     Hypertension Mother      meds     Coronary Artery Disease Mother 67     quadruple bypass     Hypertension Sister      Anxiety Disorder Sister      Anxiety Disorder Daughter      Anxiety Disorder Niece      Hypertension Sister      meds     Anxiety Disorder Sister      CANCER No family hx of      CEREBROVASCULAR DISEASE No family hx of      Thyroid Disease No family hx of      Glaucoma No family hx of      Macular Degeneration No family hx of        Objective  /80  Pulse 96  Ht 5' 1.25\" (1.556 m)  Wt 153 lb 12.8 oz (69.8 kg)  BMI 28.82 kg/m2    GENERAL APPEARANCE: healthy, alert and no distress   GAIT: NORMAL  SKIN: no suspicious " lesions or rashes  HEENT: Sclera clear, anicteric  CV: good peripheral pulses  RESP: Breathing not labored  NEURO: Normal strength and tone, mentation intact and speech normal  PSYCH:  mentation appears normal and affect normal/bright    Bilateral hip exam    Inspection:      ecchymosis mild lateral thigh    Tender:      none bilateral    Non Tender:      remainder of the hip area bilateral    ROM:     Full active and passive ROM  bilateral    Strength:      flexion 4/5 left       abduction 5/5 bilateral       adduction 5/5 bilateral    Sensation:      grossly intact in hip and thigh    Special Tests:      neg (-) WILL left       neg (-) FADIR left       neg (-) scour left      Bilateral Knee exam  Inspection:      One spot of lateral bruising left    Patella:      Normal patellar tracking noted through range of motion bilateral    Tender:      none    Non Tender:      remainder of knee area bilateral    Knee ROM:      Full active and passive ROM with flexion and extension bilateral    Hip ROM:     Full active and passive ROM bilateral    Strength:      5/5 with knee extension bilateral    Special Tests:     neg (-) Kaila left       neg (-) Lachmans left       neg (-) anterior drawer left       neg (-) posterior drawer left       neg (-) varus at 0 deg and 30 deg left       neg (-) valgus at 0 deg and 30 deg left    Neurovascular:      2+ peripheral pulses bilaterally and brisk capillary refill       sensation grossly intact    Radiology  I ordered, visualized and reviewed these images with the patient  PELVIS AND LEFT HIP ONE VIEW  1/8/2018 1:46 PM  HISTORY:  Left hip pain after injury.  COMPARISON:  None.  FINDINGS:  No fracture or osseous lesion is seen. The joint spaces are  well preserved. No adjacent soft tissue pathology is seen.  IMPRESSION:  Unremarkable examination.    AP and sunrise bilateral and left lateral XR views of knees reviewed: no acute bony abnormality, no significant degenerative change  -  will follow official read    Assessment:  1. Pain of left hip joint    2. Acute pain of left knee      Likely functional pain, hip flexor strain related to fall.  We discussed continued supportive care and physical therapy.  Less likely lumbar etiology.  Would consider imaging pending clinical course.    Plan:  - Today's Plan of Care:  Rehab: Physical Therapy: [facility] - [phone]    -We also discussed other future treatment options:  MRI    Follow Up: 1 month    Concerning signs and symptoms were reviewed.  The patient expressed understanding of this management plan and all questions were answered at this time.    Maritza Morin MD CAQ  Primary Care Sports Medicine  [facility]    Again, thank you for allowing me to participate in the care of your patient.        Sincerely,        Maritza Morin MD

## 2018-01-11 ENCOUNTER — THERAPY VISIT (OUTPATIENT)
Dept: PHYSICAL THERAPY | Facility: CLINIC | Age: 43
End: 2018-01-11
Payer: COMMERCIAL

## 2018-01-11 DIAGNOSIS — M54.50 LUMBAGO: Primary | ICD-10-CM

## 2018-01-11 PROCEDURE — 97112 NEUROMUSCULAR REEDUCATION: CPT | Mod: GP | Performed by: PHYSICAL THERAPIST

## 2018-01-11 PROCEDURE — 97161 PT EVAL LOW COMPLEX 20 MIN: CPT | Mod: GP | Performed by: PHYSICAL THERAPIST

## 2018-01-11 PROCEDURE — 97110 THERAPEUTIC EXERCISES: CPT | Mod: GP | Performed by: PHYSICAL THERAPIST

## 2018-01-11 NOTE — MR AVS SNAPSHOT
After Visit Summary   1/11/2018    Yvonne Zarco    MRN: [id number]           Patient Information     Date Of Birth          1975        Visit Information        Provider Department      1/11/2018 4:50 PM Ciarra Cardoza, PT [facility]        Today's Diagnoses     Lumbago    -  1       Follow-ups after your visit        Your next 10 appointments already scheduled     Ramy 15, 2018  3:40 PM CST   [facility] Spine with Christopher Dahl PT   [facility] ([facility])    [address]  [facility] [id number] [phone]            Jan 22, 2018  7:30 AM CST   UNA Spine with Christopher Dahl PT   [facility] ([facility])    [address]  [facility] [id number] [phone]              Who to contact     If you have questions or need follow up information about today's clinic visit or your schedule please contact [facility] directly at [phone].  Normal or non-critical lab and imaging results will be communicated to you by [employer], letter or phone within 4 business days after the clinic has received the results. If you do not hear from us within 7 days, please contact the clinic through [employer] or phone. If you have a critical or abnormal lab result, we will notify you by phone as soon as possible.  Submit refill requests through [employer] or call your pharmacy and they will forward the refill request to us. Please allow 3 business days for your refill to be completed.          Additional Information About Your Visit        MyChart Information     [employer] gives you secure access to your electronic health record. If you see a primary care provider, you can also send messages to your care team and make appointments. If you have questions, please call your  primary care clinic.  If you do not have a primary care provider, please call [phone] and they will assist you.        Care EveryWhere ID     This is your Care EveryWhere ID. This could be used by other organizations to access your [place] medical records  [id number]         Blood Pressure from Last 3 Encounters:   01/08/18 129/80   10/25/17 126/74   08/31/17 122/78    Weight from Last 3 Encounters:   01/08/18 69.8 kg (153 lb 12.8 oz)   10/25/17 68.3 kg (150 lb 9.6 oz)   03/20/17 68.4 kg (150 lb 12.8 oz)              We Performed the Following     HC PT EVAL, LOW COMPLEXITY     UNA INITIAL EVAL REPORT     NEUROMUSCULAR RE-EDUCATION     THERAPEUTIC EXERCISES        Primary Care Provider Office Phone # Fax #    Rima Carolyngabby Mccollum, APRN [facility] [phone]       [address] [facility] [address]        Equal Access to Services     JAGDISH QUEZADA : Hadii aad ku hadasho Soomaali, waaxda luqadaha, qaybta kaalmada adeegyada, waxay idiin hayaan adeeg kharaayla la'polo . So [facility] [phone].    ATENCIÓN: Si habla español, tiene a javier disposición servicios gratuitos de asistencia lingüística. Llame al [phone].    We comply with applicable federal civil rights laws and [place] laws. We do not discriminate on the basis of race, color, national origin, age, disability, sex, sexual orientation, or gender identity.            Thank you!     Thank you for choosing [facility]  for your care. Our goal is always to provide you with excellent care. Hearing back from our patients is one way we can continue to improve our services. Please take a few minutes to complete the written survey that you may receive in the mail after your visit with us. Thank you!             Your Updated Medication List - Protect others around you: Learn how to safely use, store and throw away your medicines at www.disposemymeds.org.          This list is accurate as of: 1/11/18  11:59 PM.  Always use your most recent med list.                   Brand Name Dispense Instructions for use Diagnosis    benzoyl peroxide 5 % Liqd     120 mL    Apply to entire face and then wash off after 5 min daily. Then apply topical clindamycin. May bleach towels.    Acne       buPROPion 150 MG 12 hr tablet    WELLBUTRIN SR    60 tablet    Take 1 tablet once daily and increase to 1 tablet twice daily after 4 to 7 days    Tobacco use disorder       calcium Citrate-vitamin D 500-400 MG-UNIT Chew     60 tablet    Take 500 mg by mouth 2 times daily    Unspecified intestinal malabsorption, Status post bariatric surgery       cyanocobalamin 1000 MCG/ML injection    VITAMIN B12    3 mL    Inject 1 mL (1,000 mcg) into the muscle every 30 days    Bariatric surgery status       DAILY MULTIVITAMIN PO      Take  by mouth. Take 2 chewable tablets daily    Bariatric surgery status       esomeprazole 20 MG CR capsule    nexIUM    84 capsule    Take 1 capsule (20 mg) by mouth 2 times daily Take 30-60 minutes before eating.    Gastroesophageal reflux disease, esophagitis presence not specified       FLEXERIL PO           order for DME     1 each    Injection Supplies for Vitamin B12: 3cc syringes w/ 27 gauge needles, 1/2 inch length    Bariatric surgery status       PARoxetine 40 MG tablet    PAXIL    90 tablet    Take 0.5 tablets (20 mg) by mouth At Bedtime    Anxiety       UNJURY Powd     2 Bottle    Take 21 g by mouth daily Unjury 21 gram-100 kcal/27 gram Powder    Bariatric surgery status

## 2018-01-11 NOTE — PROGRESS NOTES
"[facility] Initial Evaluation  Subjective:  Patient is a 42 year old female presenting with rehab back hpi. The history is provided by the patient. No  was used.   Yvonne Zarco is a 42 year old female with a lumbar condition.  Condition occurred with:  A fall/slip.  Condition occurred: during recreation/sport and in the community.  This is a new condition  She has had 5 falls within the past year--mostly when she is doing strenuous activity and she loses her balance and she falls. She hasn't had any issues until her fall on 12-24-17 where she landed on her L hip and knee. She first felt sx on 1-1-18 when she was wearing a lot of clothing and her leg felt \"lame\" and was more sore again last Saturday when she was wearing heavier clothing again where she noticed the numbness, tingling and weakness. One time she jumped out of the truck last Sunday and she noticed a \"zinging feeling in her leg\" and since that day she has noticed the same sx when she goes from sit to stand and feels as though the sx are worsening. PMH: significant for low back pain when she was 16. .      Radiates to:  Thigh left, knee left and no radiation.  Pain is described as sharp, shooting, aching and burning and is intermittent and reported as 8/10.  Associated symptoms:  Loss of motion/stiffness, numbness, loss of strength and tingling. Pain is worse in the P.M..  Symptoms are exacerbated by bending, twisting, lifting, walking and standing and relieved by rest, NSAID's and heat.  Since onset symptoms are gradually worsening.  Special tests:  X-ray (x-rays were negative for hip/knee).      General health as reported by patient is good.  Pertinent medical history includes:  Depression, high blood pressure, history of fractures, overweight and smoking (3x/day for smoking, and bariatric surgery in 2014).  Medical allergies: no.  Other surgeries include:  Orthopedic surgery (ankle scope in 2012, 2 c-sections " and hysterectomy and gall bladder removal).  Current medications:  Muscle relaxants, anti-inflammatory and anti-depressants.  Current occupation is [employer]   .    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    Barriers include:  None as reported by the patient.    Red flags:  None as reported by the patient.                        Objective:  System         Lumbar/SI Evaluation  ROM:    AROM Lumbar:   Flexion:        Fingers to floor  Ext:                    40 deg   Side Bend:        Left:     Right:   Rotation:           Left:     Right:   Side Glide:        Left:     Right:           Lumbar Myotomes:    T12-L3 (Hip Flex):  Left: 4-    Right: 5  L2-4 (Quads):  Left:  4    Right:  5  L4 (Ankle DF):  Left:  4-    Right:  5  L5 (Great Toe Ext): Left: 4    Right: 5   S1 (Toe Raise):  Left: 4    Right: 5  Lumbar DTR's:    L4 (Quad):  Left:  4   Right:  4  S1 (Achilles):  Left:  0   Right:  0    Lumbar Dermtomes:        L2 Left:  Hypo-light touch     L2 Right:  Normal-light touch  L3 Left:  Hypo-light touch     L3 Right:  Normal-light touch  L4 Left:  Hypo-light touch       L4 Right:  Normal-light touch  L5 Left:  Normal-light touch     L5 Right:  Normal-light touch  S1 Left:  Normal-light touch     S1 Right:  Normal-light touch                                                           [place] Lumbar Evaluation        Test Movements:  FIS: During: no effect  After: no effect  Mechanical Response: no effect  Repeat FIS: During: no effect  After: no effect  Mechanical Response: no effect  EIS: During: no effect  After: no effect  Mechanical Response: no effect  Repeat EIS: During: no effect  After: centralizing  Mechanical Response: IncROM    EIL: During: produces  After: no effect  Mechanical Response: no effect  Repeat EIL: During: produces  After: centralizing  Mechanical Response: IncROM        Conclusion lumbar: likely lumbar derangement from fall with radiculopathy                                           ROS    Assessment/Plan:    Patient is a 42 year old female with lumbar, left side hip and left side knee complaints.    Patient has the following significant findings with corresponding treatment plan.                Diagnosis 1:  L leg/hip pain likely lumbar involvement    Pain -  hot/cold therapy, manual therapy, self management, education, directional preference exercise and home program  Decreased ROM/flexibility - manual therapy and therapeutic exercise  Decreased joint mobility - manual therapy and therapeutic exercise  Decreased strength - therapeutic exercise and therapeutic activities  Impaired gait - gait training  Impaired muscle performance - neuro re-education  Decreased function - therapeutic activities    Therapy Evaluation Codes:   1) History comprised of:   Personal factors that impact the plan of care:      Past/current experiences.    Comorbidity factors that impact the plan of care are:      High blood pressure and Smoking.     Medications impacting care: Pain.  2) Examination of Body Systems comprised of:   Body structures and functions that impact the plan of care:      Lumbar spine.   Activity limitations that impact the plan of care are:      Bending, Driving, Dressing, Sitting, Stairs, Standing and Walking.  3) Clinical presentation characteristics are:   Stable/Uncomplicated.  4) Decision-Making    Low complexity using standardized patient assessment instrument and/or measureable assessment of functional outcome.  Cumulative Therapy Evaluation is: Low complexity.    Previous and current functional limitations:  (See Goal Flow Sheet for this information)    Short term and Long term goals: (See Goal Flow Sheet for this information)     Communication ability:  Patient appears to be able to clearly communicate and understand verbal and written communication and follow directions correctly.  Treatment Explanation - The following has been discussed with the patient:   RX  ordered/plan of care  Anticipated outcomes  Possible risks and side effects  This patient would benefit from PT intervention to resume normal activities.   Rehab potential is good.    Frequency:  1 X week, once daily  Duration:  for 6 weeks  Discharge Plan:  Achieve all LTG.  Independent in home treatment program.  Reach maximal therapeutic benefit.    Please refer to the daily flowsheet for treatment today, total treatment time and time spent performing 1:1 timed codes.

## 2018-01-12 PROBLEM — M54.50 LUMBAGO: Status: ACTIVE | Noted: 2018-01-12

## 2018-01-15 ENCOUNTER — THERAPY VISIT (OUTPATIENT)
Dept: PHYSICAL THERAPY | Facility: CLINIC | Age: 43
End: 2018-01-15
Payer: COMMERCIAL

## 2018-01-15 ENCOUNTER — MYC MEDICAL ADVICE (OUTPATIENT)
Dept: FAMILY MEDICINE | Facility: CLINIC | Age: 43
End: 2018-01-15

## 2018-01-15 DIAGNOSIS — M54.50 LUMBAGO: ICD-10-CM

## 2018-01-15 PROCEDURE — 97110 THERAPEUTIC EXERCISES: CPT | Mod: GP | Performed by: PHYSICAL THERAPIST

## 2018-01-15 NOTE — TELEPHONE ENCOUNTER
Patient saw Maritza Morin MD for this- please have patient contact her for the letter.  Rima Mccollum CNP

## 2018-01-18 ENCOUNTER — MYC MEDICAL ADVICE (OUTPATIENT)
Dept: ORTHOPEDICS | Facility: CLINIC | Age: 43
End: 2018-01-18

## 2018-01-18 DIAGNOSIS — M54.16 LUMBAR RADICULOPATHY: Primary | ICD-10-CM

## 2018-01-18 NOTE — TELEPHONE ENCOUNTER
Lumbar MRI ordered.  Please advise patient needs to schedule follow up visit to review results.  Please advise we may need x-rays at that appointment.    Maritza Morin MD

## 2018-01-19 ENCOUNTER — RADIANT APPOINTMENT (OUTPATIENT)
Dept: MRI IMAGING | Facility: CLINIC | Age: 43
End: 2018-01-19
Attending: PEDIATRICS
Payer: COMMERCIAL

## 2018-01-19 DIAGNOSIS — M54.16 LUMBAR RADICULOPATHY: ICD-10-CM

## 2018-01-19 LAB — RADIOLOGIST FLAGS: ABNORMAL

## 2018-01-19 PROCEDURE — 72148 MRI LUMBAR SPINE W/O DYE: CPT | Mod: TC

## 2018-01-22 ENCOUNTER — TELEPHONE (OUTPATIENT)
Dept: ORTHOPEDICS | Facility: CLINIC | Age: 43
End: 2018-01-22

## 2018-01-22 ENCOUNTER — TELEPHONE (OUTPATIENT)
Dept: FAMILY MEDICINE | Facility: CLINIC | Age: 43
End: 2018-01-22

## 2018-01-22 DIAGNOSIS — R19.00 PELVIC MASS: Primary | ICD-10-CM

## 2018-01-22 NOTE — TELEPHONE ENCOUNTER
Left a VM requesting a call back.  Asked patient to leave 2-3 times they are available over the next 2-3 days so I can give them a call back.    Maritza Morin MD

## 2018-01-22 NOTE — TELEPHONE ENCOUNTER
Patient notified of MRI finding of pelvic mass- she will schedule pelvic US and then follow up with me in clinic.  Rima Mccollum, CNP

## 2018-01-23 ENCOUNTER — RADIANT APPOINTMENT (OUTPATIENT)
Dept: ULTRASOUND IMAGING | Facility: CLINIC | Age: 43
End: 2018-01-23
Attending: NURSE PRACTITIONER
Payer: COMMERCIAL

## 2018-01-23 DIAGNOSIS — R19.00 PELVIC MASS: ICD-10-CM

## 2018-01-23 PROCEDURE — 76830 TRANSVAGINAL US NON-OB: CPT

## 2018-01-23 PROCEDURE — 76856 US EXAM PELVIC COMPLETE: CPT

## 2018-01-23 NOTE — TELEPHONE ENCOUNTER
Spoke with patient and reviewed MRI results, she had already spoken with PCP.  Patient has follow up with PCP to evaluate pelvic mass.  Recommended follow up with me as needed pending that evaluation to further evaluate symptoms if needed.    Concerning signs and symptoms were reviewed.  The patient expressed understanding of this management plan and all questions were answered at this time.    Maritza Morin MD CAQ  Primary Care Sports Medicine  [facility]

## 2018-01-25 ENCOUNTER — OFFICE VISIT (OUTPATIENT)
Dept: FAMILY MEDICINE | Facility: CLINIC | Age: 43
End: 2018-01-25
Payer: COMMERCIAL

## 2018-01-25 VITALS
OXYGEN SATURATION: 100 % | RESPIRATION RATE: 15 BRPM | TEMPERATURE: 97.3 F | HEART RATE: 106 BPM | BODY MASS INDEX: 29.07 KG/M2 | SYSTOLIC BLOOD PRESSURE: 128 MMHG | DIASTOLIC BLOOD PRESSURE: 80 MMHG | HEIGHT: 61 IN | WEIGHT: 154 LBS

## 2018-01-25 DIAGNOSIS — M54.42 ACUTE LEFT-SIDED LOW BACK PAIN WITH LEFT-SIDED SCIATICA: ICD-10-CM

## 2018-01-25 DIAGNOSIS — M25.562 ACUTE PAIN OF LEFT KNEE: ICD-10-CM

## 2018-01-25 DIAGNOSIS — N83.292 COMPLEX CYST OF LEFT OVARY: Primary | ICD-10-CM

## 2018-01-25 PROCEDURE — 99214 OFFICE O/P EST MOD 30 MIN: CPT | Performed by: FAMILY MEDICINE

## 2018-01-25 RX ORDER — METHYLPREDNISOLONE 4 MG
TABLET, DOSE PACK ORAL
Qty: 21 TABLET | Refills: 0 | Status: SHIPPED | OUTPATIENT
Start: 2018-01-25 | End: 2018-02-27

## 2018-01-25 RX ORDER — CYCLOBENZAPRINE HCL 10 MG
5-10 TABLET ORAL 3 TIMES DAILY PRN
Qty: 30 TABLET | Refills: 1 | Status: SHIPPED | OUTPATIENT
Start: 2018-01-25 | End: 2018-05-15

## 2018-01-25 NOTE — PROGRESS NOTES
SUBJECTIVE:                                                    Yvonne Zarco is a 42 year old female who presents to clinic today for the following health issues:      Back Pain       Duration: Neymar daisy -fell down and has had shooting pain left Leg        Specific cause: fall     Description:   Location of pain: low back left and  Shooting pain  Character of pain: shooting pain  Pain radiation:radiates into the left leg  New numbness or weakness in legs, not attributed to pain:  no     Intensity: moderate, severe    History:   Pain interferes with job: has been Going work  History of back problems: recurrent self limited episodes of low back pain in the past  Any previous MRI or X-rays: Yes- at [facility].  Date see [facility]  Sees a specialist for back pain:  Dr Burton  Therapies tried without relief: PT    Alleviating factors:   Improved by: lying      Precipitating factors:  Worsened by: movement    Functional and Psychosocial Screen (Adrianne STarT Back):      Not performed today          Accompanying Signs & Symptoms:  Risk of Fracture:  None  Risk of Cauda Equina:  None  Risk of Infection:  None  Risk of Cancer:  None  Risk of Ankylosing Spondylitis:  Onset at age <35, male, AND morning back stiffness. no     Pt had a Ultrasound and it showed complex ovarian cyst  Pt has had Hysterectomy a few years ago    Pt is seeing a specialist at [facility]  For Back and Knee pain and has had Xrays and MRI done        Problem list and histories reviewed & adjusted, as indicated.  Additional history: as documented    Patient Active Problem List   Diagnosis     GERD (gastroesophageal reflux disease)     Bariatric surgery status     Anxiety     Tobacco use disorder     Lumbago     Past Surgical History:   Procedure Laterality Date      SECTION      x2     CHOLECYSTECTOMY       ESOPHAGOSCOPY, GASTROSCOPY, DUODENOSCOPY (EGD), COMBINED  2012    Procedure: COMBINED ESOPHAGOSCOPY, GASTROSCOPY, DUODENOSCOPY (EGD), BIOPSY  SINGLE OR MULTIPLE;;  Surgeon: Scott Seth MD;  Location:  GI     GENITOURINARY SURGERY  ??    bladder sling with hysterectomy early      GYN SURGERY      tubal ligation     hysterectomy with bladder sling      left ovaries     HYSTERECTOMY, PAP NO LONGER INDICATED       LAPAROSCOPIC GASTRIC SLEEVE  5/10/2013    Procedure: LAPAROSCOPIC GASTRIC SLEEVE;  Laparoscopic Sleeve Gastrectomy with hiatal hernia repair;  Surgeon: Scott Seth MD;  Location: [facility] OR     LASIK BILATERAL       ORTHOPEDIC SURGERY      right foot surgery       Social History   Substance Use Topics     Smoking status: Current Every Day Smoker     Packs/day: 0.50     Years: 22.00     Types: Cigarettes     Smokeless tobacco: Never Used      Comment: Tried many times     Alcohol use No     Family History   Problem Relation Age of Onset     Hypertension Father           DIABETES Mother      diet controlled     Hypertension Mother      meds     Coronary Artery Disease Mother 67     quadruple bypass     Hypertension Sister      Anxiety Disorder Sister      Anxiety Disorder Daughter      Anxiety Disorder Niece      Hypertension Sister      meds     Anxiety Disorder Sister      CANCER No family hx of      CEREBROVASCULAR DISEASE No family hx of      Thyroid Disease No family hx of      Glaucoma No family hx of      Macular Degeneration No family hx of          Current Outpatient Prescriptions   Medication Sig Dispense Refill     methylPREDNISolone (MEDROL DOSEPAK) 4 MG tablet Follow package instructions 21 tablet 0     cyclobenzaprine (FLEXERIL) 10 MG tablet Take 0.5-1 tablets (5-10 mg) by mouth 3 times daily as needed for muscle spasms 30 tablet 1     diclofenac (VOLTAREN) 1 % GEL topical gel Apply 4 grams to knees or 2 grams to hands four times daily using enclosed dosing card. 100 g 1     order for DME Equipment being ordered: crutches 1 each 0     Cyclobenzaprine HCl (FLEXERIL PO)        esomeprazole (NEXIUM) 20  MG CR capsule Take 1 capsule (20 mg) by mouth 2 times daily Take 30-60 minutes before eating. 84 capsule 8     PARoxetine (PAXIL) 40 MG tablet Take 0.5 tablets (20 mg) by mouth At Bedtime 90 tablet 3     Protein (UNJURY) POWD Take 21 g by mouth daily Unjury 21 gram-100 kcal/27 gram Powder 2 Bottle 11     buPROPion (WELLBUTRIN SR) 150 MG 12 hr tablet Take 1 tablet once daily and increase to 1 tablet twice daily after 4 to 7 days 60 tablet 2     calcium Citrate-vitamin D 500-400 MG-UNIT CHEW Take 500 mg by mouth 2 times daily 60 tablet prn     Multiple Vitamin (DAILY MULTIVITAMIN PO) Take  by mouth. Take 2 chewable tablets daily       cyanocobalamin (VITAMIN B12) 1000 MCG/ML injection Inject 1 mL (1,000 mcg) into the muscle every 30 days 3 mL 3     benzoyl peroxide 5 % LIQD Apply to entire face and then wash off after 5 min daily. Then apply topical clindamycin. May bleach towels. (Patient not taking: Reported on 1/8/2018) 120 mL 11     ORDER FOR DME Injection Supplies for Vitamin B12: 3cc syringes w/ 27 gauge needles, 1/2 inch length 1 each 11     Allergies   Allergen Reactions     Ciprofloxacin Itching     Recent Labs   Lab Test  10/25/17   1659  02/03/17   0732  03/16/15   0736  05/23/14   0804  08/21/13   1323   05/07/13   1012   A1C   --    --   4.5  4.7  4.6   --    --    LDL   --    --   125  119  78   --    --    HDL   --    --   54  42*  39*   --    --    TRIG   --    --   119  152*  220*   --    --    ALT   --   20  20  17  22   < >   --    CR  0.69  0.62  0.57  0.57  0.61   < >   --    GFRESTIMATED  >90  >90  Non African American GFR Calc    >90  Non  GFR Calc    >90  >90   < >   --    GFRESTBLACK  >90  >90  African American GFR Calc    >90   GFR Calc    >90  >90   < >   --    POTASSIUM  4.4  4.3  4.1  4.0  4.1   < >   --    TSH   --    --    --    --    --    --   1.51    < > = values in this interval not displayed.      BP Readings from Last 3 Encounters:   01/25/18 128/80  "  01/08/18 129/80   10/25/17 126/74    Wt Readings from Last 3 Encounters:   01/25/18 154 lb (69.9 kg)   01/08/18 153 lb 12.8 oz (69.8 kg)   10/25/17 150 lb 9.6 oz (68.3 kg)                  Labs reviewed in EPIC    ROS:  C: NEGATIVE for fever, chills, change in weight  GI: NEGATIVE for nausea, abdominal pain, heartburn, or change in bowel habits  : has had a Hysterectomy-No pelvic pain  MUSCULOSKELETAL: as above  NEURO: as above    OBJECTIVE:     /80  Pulse 106  Temp 97.3  F (36.3  C)  Resp 15  Ht 5' 1\" (1.549 m)  Wt 154 lb (69.9 kg)  SpO2 100%  BMI 29.1 kg/m2  Body mass index is 29.1 kg/(m^2).  GENERAL: healthy, alert and no distress  RESP: lungs clear to auscultation - no rales, rhonchi or wheezes  CV: regular rate and rhythm, normal S1 S2, no S3 or S4, no murmur, click or rub, no peripheral edema and peripheral pulses strong  ABDOMEN: soft, nontender, no hepatosplenomegaly, no masses and bowel sounds normal  MS: spine exam shows no scoliosis  NEURO: Normal strength and tone, mentation intact and speech normal  Comprehensive back pain exam:  No tenderness, Pain limits the following motions: pain with any Movement, Lower extremity strength functional and equal on both sides, Lower extremity reflexes within normal limits bilaterally, Lower extremity sensation normal and equal on both sides and Straight leg raise negative bilaterally   Pt also has Left Knee pain after her fall and has pain with walking  This has been sabrina;uated By Ortho-epic Notes reviewed     Diagnostic Test Results:  Results for orders placed or performed in visit on 01/23/18   US Pelvic Complete with Transvaginal    Narrative    ULTRASOUND PELVIC COMPLETE WITH TRANSVAGINAL January 23, 2018 4:48 PM     HISTORY: Pelvic mass.     COMPARISON: Lumbar spine MRI 1/19/2018.    FINDINGS: Transvaginal images were performed to better evaluate the  patient's uterus, ovaries and endometrial stripe.    The uterus is surgically absent. The right " ovary measures 2.6 x 2.1 x  2.1 cm and contains a small complex appearing cystic lesion measuring  1.8 cm. The left ovary measures 5.8 x 4.2 x 6.3 cm. There is a large  complex cystic lesion without color Doppler flow involving the left  ovary nearly the size of the entire left ovary. Color Doppler flow is  noted within the right ovary. Left ovarian color flow is difficult to  confirm due to the large complex cystic lesion. Only a thin rim of  ovarian tissue is visualized on some of the ultrasound images. No  adnexal masses are present. No free pelvic fluid is present.      Impression    IMPRESSION: Large complex cystic lesion left ovary without associated  color Doppler flow most likely an endometrioma or large hemorrhagic  cyst as seen on recent lumbar spine MRI. Complex appearing cystic  lesion right ovary is also noted. Prior hysterectomy. Follow-up  ultrasound in two or three months recommended to assess for interval  resolution.    JO LAMAS MD       ASSESSMENT/PLAN:       1. Complex cyst of left ovary  Advised make appointment with GYN  - OB/GYN REFERRAL    2. Acute left-sided low back pain with left-sided sciatica  Advised continue PT as recommended   - methylPREDNISolone (MEDROL DOSEPAK) 4 MG tablet; Follow package instructions  Dispense: 21 tablet; Refill: 0  - cyclobenzaprine (FLEXERIL) 10 MG tablet; Take 0.5-1 tablets (5-10 mg) by mouth 3 times daily as needed for muscle spasms  Dispense: 30 tablet; Refill: 1  - diclofenac (VOLTAREN) 1 % GEL topical gel; Apply 4 grams to knees or 2 grams to hands four times daily using enclosed dosing card.  Dispense: 100 g; Refill: 1  - order for DME; Equipment being ordered: crutches  Dispense: 1 each; Refill: 0    3. Acute pain of left knee  Advised crutches/ace  Follow up with su Egan MD  [facility]

## 2018-01-25 NOTE — MR AVS SNAPSHOT
After Visit Summary   1/25/2018    Yvonne Zarco    MRN: [id number]           Patient Information     Date Of Birth          1975        Visit Information        Provider Department      1/25/2018 9:40 AM Yazmin Egan MD [facility]        Today's Diagnoses     Complex cyst of left ovary    -  1    Acute left-sided low back pain with left-sided sciatica        Acute pain of left knee          Care Instructions    [facility]    If you have any questions regarding to your visit please contact your care team:       Team Red:   Clinic Hours Telephone Number   Dr. Ebonie Ruvalcaba  (pediatrics)  Rima Mccollum NP 7am-7pm  Monday - Thursday   7am-5pm  Fridays  [phone] (fax)    Gerda COSTELLO  [phone]   Urgent Care - [place] and [place] Monday-Friday  [place] - 11am-8pm  Saturday-Sunday  Both sites - 9am-5pm  [phone] - [facility]  [phone] - [place]       What options do I have for visits at the clinic other than the traditional office visit?  To expand how we care for you, many of our providers are utilizing electronic visits (e-visits) and telephone visits, when medically appropriate, for interactions with their patients rather than a visit in the clinic.   We also offer nurse visits for many medical concerns. Just like any other service, we will bill your insurance company for this type of visit based on time spent on the phone with your provider. Not all insurance companies cover these visits. Please check with your medical insurance if this type of visit is covered. You will be responsible for any charges that are not paid by your insurance.      E-visits via [employer]:  generally incur a $35.00 fee.  Telephone visits:  Time spent on the phone: *charged based on time that is spent on the phone in increments of 10 minutes. Estimated cost:   5-10 mins $30.00   11-20 mins. $59.00   21-30 mins.  $85.00     As always, Thank you for trusting us with your health care needs!            Discharged by Chloe Breaux MA.            Follow-ups after your visit        Additional Services     OB/GYN REFERRAL       Your provider has referred you to:  [facility]: [facility] [place] [phone]   [email or web address]    Please be aware that coverage of these services is subject to the terms and limitations of your health insurance plan.  Call member services at your health plan with any benefit or coverage questions.      Please bring the following to your appointment:  >>   Any x-rays, CTs or MRIs which have been performed.  Contact the facility where they were done to arrange for  prior to your scheduled appointment.  Any new CT, MRI or other procedures ordered by your specialist must be performed at a [place] facility or coordinated by your clinic's referral office.    >>   List of current medications   >>   This referral request   >>   Any documents/labs given to you for this referral                  Who to contact     If you have questions or need follow up information about today's clinic visit or your schedule please contact [facility] directly at [phone].  Normal or non-critical lab and imaging results will be communicated to you by MyChart, letter or phone within 4 business days after the clinic has received the results. If you do not hear from us within 7 days, please contact the clinic through MyChart or phone. If you have a critical or abnormal lab result, we will notify you by phone as soon as possible.  Submit refill requests through [employer] or call your pharmacy and they will forward the refill request to us. Please allow 3 business days for your refill to be completed.          Additional Information About Your Visit        [employer] Information     [employer] gives you secure access to your electronic health record. If you see a primary care  "provider, you can also send messages to your care team and make appointments. If you have questions, please call your primary care clinic.  If you do not have a primary care provider, please call [phone] and they will assist you.        Care EveryWhere ID     This is your Care EveryWhere ID. This could be used by other organizations to access your [place] medical records  [id number]        Your Vitals Were     Pulse Temperature Respirations Height Pulse Oximetry BMI (Body Mass Index)    106 97.3  F (36.3  C) 15 5' 1\" (1.549 m) 100% 29.1 kg/m2       Blood Pressure from Last 3 Encounters:   01/25/18 128/80   01/08/18 129/80   10/25/17 126/74    Weight from Last 3 Encounters:   01/25/18 154 lb (69.9 kg)   01/08/18 153 lb 12.8 oz (69.8 kg)   10/25/17 150 lb 9.6 oz (68.3 kg)              We Performed the Following     OB/GYN REFERRAL          Today's Medication Changes          These changes are accurate as of 1/25/18 10:23 AM.  If you have any questions, ask your nurse or doctor.               Start taking these medicines.        Dose/Directions    diclofenac 1 % Gel topical gel   Commonly known as:  VOLTAREN   Used for:  Acute left-sided low back pain with left-sided sciatica   Started by:  Yazmin Egan MD        Apply 4 grams to knees or 2 grams to hands four times daily using enclosed dosing card.   Quantity:  100 g   Refills:  1       methylPREDNISolone 4 MG tablet   Commonly known as:  MEDROL DOSEPAK   Used for:  Acute left-sided low back pain with left-sided sciatica   Started by:  Yazmin Egan MD        Follow package instructions   Quantity:  21 tablet   Refills:  0       order for DME   Used for:  Acute left-sided low back pain with left-sided sciatica   Started by:  Yazmin Egan MD        Equipment being ordered: crutches   Quantity:  1 each   Refills:  0         These medicines have changed or have updated prescriptions.        Dose/Directions    * FLEXERIL PO   This may have changed:  Another " medication with the same name was added. Make sure you understand how and when to take each.   Changed by:  Yazmin Egan MD        Refills:  0       * cyclobenzaprine 10 MG tablet   Commonly known as:  FLEXERIL   This may have changed:  You were already taking a medication with the same name, and this prescription was added. Make sure you understand how and when to take each.   Used for:  Acute left-sided low back pain with left-sided sciatica   Changed by:  Yazmin Egan MD        Dose:  5-10 mg   Take 0.5-1 tablets (5-10 mg) by mouth 3 times daily as needed for muscle spasms   Quantity:  30 tablet   Refills:  1       * Notice:  This list has 2 medication(s) that are the same as other medications prescribed for you. Read the directions carefully, and ask your doctor or other care provider to review them with you.         Where to get your medicines      These medications were sent to [facility] [place]  [place], [place] - [address]  [address], [place] [address]     Phone:  [phone]     cyclobenzaprine 10 MG tablet    diclofenac 1 % Gel topical gel    methylPREDNISolone 4 MG tablet         Some of these will need a paper prescription and others can be bought over the counter.  Ask your nurse if you have questions.     Bring a paper prescription for each of these medications     order for DME                Primary Care Provider Office Phone # Fax #    [facility] [phone] [address] [address]        Equal Access to Services     JAGDISH QUEZADA AH: Hadii aad ku hadasho Sodanielali, waaxda luqadaha, qaybta kaalmada adeegyada, surjit telles. So [facility] [phone].    ATENCIÓN: Si habla español, tiene a javier disposición servicios gratuitos de asistencia lingüística. Llame al [phone].    We comply with applicable federal civil rights laws and [place] laws. We do not discriminate on the basis  of race, color, national origin, age, disability, sex, sexual orientation, or gender identity.            Thank you!     Thank you for choosing [facility]  for your care. Our goal is always to provide you with excellent care. Hearing back from our patients is one way we can continue to improve our services. Please take a few minutes to complete the written survey that you may receive in the mail after your visit with us. Thank you!             Your Updated Medication List - Protect others around you: Learn how to safely use, store and throw away your medicines at www.disposemymeds.org.          This list is accurate as of 1/25/18 10:23 AM.  Always use your most recent med list.                   Brand Name Dispense Instructions for use Diagnosis    benzoyl peroxide 5 % Liqd     120 mL    Apply to entire face and then wash off after 5 min daily. Then apply topical clindamycin. May bleach towels.    Acne       buPROPion 150 MG 12 hr tablet    WELLBUTRIN SR    60 tablet    Take 1 tablet once daily and increase to 1 tablet twice daily after 4 to 7 days    Tobacco use disorder       calcium Citrate-vitamin D 500-400 MG-UNIT Chew     60 tablet    Take 500 mg by mouth 2 times daily    Unspecified intestinal malabsorption, Status post bariatric surgery       cyanocobalamin 1000 MCG/ML injection    VITAMIN B12    3 mL    Inject 1 mL (1,000 mcg) into the muscle every 30 days    Bariatric surgery status       DAILY MULTIVITAMIN PO      Take  by mouth. Take 2 chewable tablets daily    Bariatric surgery status       diclofenac 1 % Gel topical gel    VOLTAREN    100 g    Apply 4 grams to knees or 2 grams to hands four times daily using enclosed dosing card.    Acute left-sided low back pain with left-sided sciatica       esomeprazole 20 MG CR capsule    nexIUM    84 capsule    Take 1 capsule (20 mg) by mouth 2 times daily Take 30-60 minutes before eating.    Gastroesophageal reflux disease, esophagitis presence not  specified       * FLEXERIL PO           * cyclobenzaprine 10 MG tablet    FLEXERIL    30 tablet    Take 0.5-1 tablets (5-10 mg) by mouth 3 times daily as needed for muscle spasms    Acute left-sided low back pain with left-sided sciatica       methylPREDNISolone 4 MG tablet    MEDROL DOSEPAK    21 tablet    Follow package instructions    Acute left-sided low back pain with left-sided sciatica       order for DME     1 each    Injection Supplies for Vitamin B12: 3cc syringes w/ 27 gauge needles, 1/2 inch length    Bariatric surgery status       order for DME     1 each    Equipment being ordered: crutches    Acute left-sided low back pain with left-sided sciatica       PARoxetine 40 MG tablet    PAXIL    90 tablet    Take 0.5 tablets (20 mg) by mouth At Bedtime    Anxiety       UNJURY Powd     2 Bottle    Take 21 g by mouth daily Unjury 21 gram-100 kcal/27 gram Powder    Bariatric surgery status       * Notice:  This list has 2 medication(s) that are the same as other medications prescribed for you. Read the directions carefully, and ask your doctor or other care provider to review them with you.

## 2018-01-25 NOTE — PATIENT INSTRUCTIONS
[facility]    If you have any questions regarding to your visit please contact your care team:       Team Red:   Clinic Hours Telephone Number   Dr. Ebonie Ruvalcaba  (pediatrics)  Rima Mccollum NP 7am-7pm  Monday - Thursday   7am-5pm  Fridays  [phone] (fax)    Gerda COSTELLO  [phone]   Urgent Care - [place] and [place] Monday-Friday  [place] - 11am-8pm  Saturday-Sunday  Both sites - 9am-5pm  [phone] - [facility]  [phone] - [place]       What options do I have for visits at the clinic other than the traditional office visit?  To expand how we care for you, many of our providers are utilizing electronic visits (e-visits) and telephone visits, when medically appropriate, for interactions with their patients rather than a visit in the clinic.   We also offer nurse visits for many medical concerns. Just like any other service, we will bill your insurance company for this type of visit based on time spent on the phone with your provider. Not all insurance companies cover these visits. Please check with your medical insurance if this type of visit is covered. You will be responsible for any charges that are not paid by your insurance.      E-visits via [employer]:  generally incur a $35.00 fee.  Telephone visits:  Time spent on the phone: *charged based on time that is spent on the phone in increments of 10 minutes. Estimated cost:   5-10 mins $30.00   11-20 mins. $59.00   21-30 mins. $85.00     As always, Thank you for trusting us with your health care needs!            Discharged by Chloe Breaux MA.

## 2018-01-29 ENCOUNTER — OFFICE VISIT (OUTPATIENT)
Dept: ORTHOPEDICS | Facility: CLINIC | Age: 43
End: 2018-01-29
Payer: COMMERCIAL

## 2018-01-29 ENCOUNTER — MYC MEDICAL ADVICE (OUTPATIENT)
Dept: ORTHOPEDICS | Facility: CLINIC | Age: 43
End: 2018-01-29

## 2018-01-29 VITALS
DIASTOLIC BLOOD PRESSURE: 84 MMHG | SYSTOLIC BLOOD PRESSURE: 129 MMHG | BODY MASS INDEX: 29.07 KG/M2 | HEIGHT: 61 IN | WEIGHT: 154 LBS

## 2018-01-29 DIAGNOSIS — S89.92XA INJURY OF LEFT KNEE, INITIAL ENCOUNTER: Primary | ICD-10-CM

## 2018-01-29 PROCEDURE — 99214 OFFICE O/P EST MOD 30 MIN: CPT | Performed by: PEDIATRICS

## 2018-01-29 NOTE — PATIENT INSTRUCTIONS
Plan:  - Today's Plan of Care:  MRI of the left knee  Continue bracing, crutches, ice  Follow-up with OB  Return to PT    Follow Up: in clinic after MRI (wait at least 1-2 days)

## 2018-01-29 NOTE — TELEPHONE ENCOUNTER
Please recommend follow up visit for repeat exam prior to further imaging.    I have not seen patient in clinic since 1/8/2018    Maritza Morin MD

## 2018-01-29 NOTE — PROGRESS NOTES
Sports Medicine Clinic Visit - Interim History 2018    PCP: Rima Mccollum    Yvonne Zarco is a 42 year old female who is seen in f/u up for left sided pain. Since last visit on 18 patient has done PT and experienced increase in lower leg symptoms so had an MRI of the lumbar spine. She feels that her left knee is unstable and radiates pain up towards the hip.  Having less radicular symptoms currently, however, still present.  Pain is more localized to the knee.    Symptoms are better with: brace  Symptoms are worse with: standing, stairs and weight bearing, putting shoes/socks on  Additional Features:   Positive: locking, instability and shooting pain into left hip/groin   Negative: swelling, bruising, popping, grinding, catching, paresthesias and numbness    Social History: desk job    Review of Systems  Skin: no bruising, no swelling  Musculoskeletal: as above  Neurologic: no numbness, paresthesias  Remainder of review of systems is negative including constitutional, CV, pulmonary, GI, except as noted in HPI or medical history.    Patient's current problem list, past medical and surgical history, and family history were reviewed.    Patient Active Problem List   Diagnosis     GERD (gastroesophageal reflux disease)     Bariatric surgery status     Anxiety     Tobacco use disorder     Lumbago     Past Medical History:   Diagnosis Date     Depressive disorder forever     Gastro-oesophageal reflux disease      Hiatal hernia      Hypertension      Past Surgical History:   Procedure Laterality Date      SECTION      x2     CHOLECYSTECTOMY       ESOPHAGOSCOPY, GASTROSCOPY, DUODENOSCOPY (EGD), COMBINED  2012    Procedure: COMBINED ESOPHAGOSCOPY, GASTROSCOPY, DUODENOSCOPY (EGD), BIOPSY SINGLE OR MULTIPLE;;  Surgeon: Scott Seth MD;  Location:  GI     GENITOURINARY SURGERY  ??    bladder sling with hysterectomy early      GYN SURGERY      tubal ligation      "hysterectomy with bladder sling      left ovaries     HYSTERECTOMY, PAP NO LONGER INDICATED       LAPAROSCOPIC GASTRIC SLEEVE  5/10/2013    Procedure: LAPAROSCOPIC GASTRIC SLEEVE;  Laparoscopic Sleeve Gastrectomy with hiatal hernia repair;  Surgeon: Scott Seth MD;  Location:  OR     [facility]       ORTHOPEDIC SURGERY      right foot surgery     Family History   Problem Relation Age of Onset     Hypertension Father           DIABETES Mother      diet controlled     Hypertension Mother      meds     Coronary Artery Disease Mother 67     quadruple bypass     Hypertension Sister      Anxiety Disorder Sister      Anxiety Disorder Daughter      Anxiety Disorder Niece      Hypertension Sister      meds     Anxiety Disorder Sister      CANCER No family hx of      CEREBROVASCULAR DISEASE No family hx of      Thyroid Disease No family hx of      Glaucoma No family hx of      Macular Degeneration No family hx of        Objective  /84 (BP Location: Right arm, Patient Position: Sitting, Cuff Size: Adult Large)  Ht 5' 1\" (1.549 m)  Wt 154 lb (69.9 kg)  BMI 29.1 kg/m2    GENERAL APPEARANCE: healthy, alert and no distress   GAIT: slightly antalgic  SKIN: no suspicious lesions or rashes  HEENT: Sclera clear, anicteric  CV: good peripheral pulses  RESP: Breathing not labored  NEURO: Normal strength and tone, mentation intact and speech normal  PSYCH:  mentation appears normal and affect normal/bright    Bilateral hip exam    Inspection:      no edema or ecchymosis in hip area    Tender:      none bilateral    Non Tender:      remainder of the hip area bilateral    ROM:     Full active and passive ROM  bilateral    Strength:      flexion 5/5 bilateral       abduction 5/5 bilateral       adduction 5/5 bilateral    Sensation:      grossly intact in hip and thigh    Special Tests:      neg (-) WILL left       neg (-) FADIR left      Neg (-) SLR left    Bilateral Knee exam  Inspection:      no " bruising, no swelling     Patella:      Normal patellar tracking noted through range of motion bilateral     Tender:      Throughout patella and lateral knee - both femoral condyle and tibial plateau     Non Tender:      remainder of knee area bilateral     Knee ROM:      Full active and passive ROM with flexion and extension bilateral - pain with end range of motion bilaterally     Strength:      5/5 with knee extension bilateral     Special Tests:     neg (-) Kaila left       neg (-) Lachmans left       neg (-) anterior drawer left       neg (-) posterior drawer left       neg (-) varus at 0 deg and 30 deg left       neg (-) valgus at 0 deg and 30 deg left     Neurovascular:      2+ peripheral pulses bilaterally and brisk capillary refill       sensation grossly intact    Radiology  I ordered, visualized and reviewed these images with the patient  Recent Results (from the past 744 hour(s))   XR Pelvis and Hip Left 1 View    Narrative    PELVIS AND LEFT HIP ONE VIEW  1/8/2018 1:46 PM    HISTORY:  Left hip pain after injury.    COMPARISON:  None.    FINDINGS:  No fracture or osseous lesion is seen. The joint spaces are  well preserved. No adjacent soft tissue pathology is seen.      Impression    IMPRESSION:  Unremarkable examination.    SHAYNE SALAS MD   XR Knee Standing AP Bilat [place] Bilat Lat Left    Narrative    KNEE STANDING AP BILATERAL SUNRISE BILATERAL LATERAL LEFT 1/8/2018  1:48 PM     HISTORY:  Injury of left knee, initial encounter.    COMPARISON: Three views of the right knee 3/20/2017.      Impression    IMPRESSION: Three views of the left knee show no bony or soft tissue  abnormality. No joint space effusion. Two views of the right knee are  unremarkable and unchanged.    PATY TOMLINSON MD   MR Lumbar Spine w/o Contrast   Result Value    Radiologist flags Large pelvic mass. (Urgent)    Narrative    MR LUMBAR SPINE WITHOUT CONTRAST   1/19/2018 9:24 AM    HISTORY: Left-sided back and leg  pain with tingling, numbness and  weakness for 2 weeks after an injury.    TECHNIQUE: Sagittal T1 and T2 and STIR, axial proton density and T2  images.    COMPARISON: None.    FINDINGS: Five functional lumbar vertebral segments are assumed. The  caudal thecal sac contents appear intrinsically normal. Alignment in  the sagittal plane is normal and vertebral bone marrow signal is  unremarkable.    T12-L1: Normal.    L1-L2: Normal.    L2-L3: Normal.    L3-L4: Normal.    L4-L5: Normal.    L5-S1: Very early signal loss. Minimal posterior disc bulging and no  disc protrusion or central or foraminal stenosis. Posterior facets are  unremarkable.    Paraspinal soft tissues appear normal. There is a large incompletely  visualized mass in the posterior mid pelvis with signal  characteristics consistent with fat. This likely represents an ovarian  dermoid or teratoma. Correlation with pelvic ultrasound is  recommended. Incidental note of a 2 cm right ovarian cyst.      Impression    IMPRESSION:   1. Very early degenerative disc disease at L5-S1 without impingement  on neural structures.  2. Large fat-containing mass in the pelvis, likely an ovarian dermoid  or teratoma. Recommend correlation with ultrasound.    [Access Center: Large pelvic mass.]    This report will be copied to the [facility] to ensure a  provider acknowledges the finding. [facility] is available Monday  through Friday 8am-3:30 pm.     PATY TOMLINSON MD   US Pelvic Complete with Transvaginal    Narrative    ULTRASOUND PELVIC COMPLETE WITH TRANSVAGINAL January 23, 2018 4:48 PM     HISTORY: Pelvic mass.     COMPARISON: Lumbar spine MRI 1/19/2018.    FINDINGS: Transvaginal images were performed to better evaluate the  patient's uterus, ovaries and endometrial stripe.    The uterus is surgically absent. The right ovary measures 2.6 x 2.1 x  2.1 cm and contains a small complex appearing cystic lesion measuring  1.8 cm. The left ovary measures 5.8 x  4.2 x 6.3 cm. There is a large  complex cystic lesion without color Doppler flow involving the left  ovary nearly the size of the entire left ovary. Color Doppler flow is  noted within the right ovary. Left ovarian color flow is difficult to  confirm due to the large complex cystic lesion. Only a thin rim of  ovarian tissue is visualized on some of the ultrasound images. No  adnexal masses are present. No free pelvic fluid is present.      Impression    IMPRESSION: Large complex cystic lesion left ovary without associated  color Doppler flow most likely an endometrioma or large hemorrhagic  cyst as seen on recent lumbar spine MRI. Complex appearing cystic  lesion right ovary is also noted. Prior hysterectomy. Follow-up  ultrasound in two or three months recommended to assess for interval  resolution.    JO LAMAS MD       Assessment:  1. Injury of left knee, initial encounter      Symptoms have localized to left knee.  Prior presentation more consistent with hip/lumbar etiology.  Reviewed Lumbar MRI - no nerve impingement.  Discussed MRI of knee next step.  Discussed supportive care in the interim including rest, ice, elevation, bracing for support and crutches for pain.  Follow up after imaging to review results and further treatment.    Plan:  - Today's Plan of Care:  MRI of the left knee  Continue bracing, crutches, ice  Follow-up with OB  Return to PT    Follow Up: in clinic after MRI (wait at least 1-2 days)    Concerning signs and symptoms were reviewed.  The patient expressed understanding of this management plan and all questions were answered at this time.    Maritza Morin MD CAQ  Primary Care Sports Medicine  [facility]

## 2018-01-29 NOTE — MR AVS SNAPSHOT
After Visit Summary   1/29/2018    Yvonne Zarco    MRN: [id number]           Patient Information     Date Of Birth          1975        Visit Information        Provider Department      1/29/2018 4:40 PM Maritza Morin MD [facility]        Today's Diagnoses     Injury of left knee, initial encounter    -  1      Care Instructions    Plan:  - Today's Plan of Care:  MRI of the left knee  Continue bracing, crutches, ice  Follow-up with OB  Return to PT    Follow Up: in clinic after MRI (wait at least 1-2 days)              Follow-ups after your visit        Your next 10 appointments already scheduled     Jan 30, 2018  2:30 PM CST   Office Visit with Maritza Fowler MD   [facility] ([facility]    [address] [place] [id number] [phone]           Bring a current list of meds and any records pertaining to this visit. For Physicals, please bring immunization records and any forms needing to be filled out. Please arrive 10 minutes early to complete paperwork.              Future tests that were ordered for you today     Open Future Orders        Priority Expected Expires Ordered    MR Knee Left w/o Contrast Routine  1/29/2019 1/29/2018            Who to contact     If you have questions or need follow up information about today's clinic visit or your schedule please contact [facility] directly at [phone].  Normal or non-critical lab and imaging results will be communicated to you by MyChart, letter or phone within 4 business days after the clinic has received the results. If you do not hear from us within 7 days, please contact the clinic through MyChart or phone. If you have a critical or abnormal lab result, we will notify you by phone as soon as possible.  Submit refill requests through [employer] or call your pharmacy and they will forward the refill request to  "us. Please allow 3 business days for your refill to be completed.          Additional Information About Your Visit        MyChart Information     [employer] gives you secure access to your electronic health record. If you see a primary care provider, you can also send messages to your care team and make appointments. If you have questions, please call your primary care clinic.  If you do not have a primary care provider, please call [phone] and they will assist you.        Care EveryWhere ID     This is your Care EveryWhere ID. This could be used by other organizations to access your [facility] medical records  [id number]        Your Vitals Were     Height BMI (Body Mass Index)                5' 1\" (1.549 m) 29.1 kg/m2           Blood Pressure from Last 3 Encounters:   01/29/18 129/84   01/25/18 128/80   01/08/18 129/80    Weight from Last 3 Encounters:   01/29/18 154 lb (69.9 kg)   01/25/18 154 lb (69.9 kg)   01/08/18 153 lb 12.8 oz (69.8 kg)               Primary Care Provider Office Phone # Fax #    Rima Carolyn Mccollum, APRN [facility] [phone]       [address] [facility] [address]        Equal Access to Services     JAGDISH QUEZADA AH: Hadii aad ku hadasho Soomaali, waaxda luqadaha, qaybta kaalmada adeegyada, waxay idiin hayaan adeeg kharash la'aan ah. So [facility] [phone].    ATENCIÓN: Si habla español, tiene a javier disposición servicios gratuitos de asistencia lingüística. Llame al [phone].    We comply with applicable federal civil rights laws and [place] laws. We do not discriminate on the basis of race, color, national origin, age, disability, sex, sexual orientation, or gender identity.            Thank you!     Thank you for choosing [facility]  for your care. Our goal is always to provide you with excellent care. Hearing back from our patients is one way we can continue to improve our services. Please take a few minutes to complete the written survey " that you may receive in the mail after your visit with us. Thank you!             Your Updated Medication List - Protect others around you: Learn how to safely use, store and throw away your medicines at www.disposemymeds.org.          This list is accurate as of 1/29/18  5:21 PM.  Always use your most recent med list.                   Brand Name Dispense Instructions for use Diagnosis    benzoyl peroxide 5 % Liqd     120 mL    Apply to entire face and then wash off after 5 min daily. Then apply topical clindamycin. May bleach towels.    Acne       buPROPion 150 MG 12 hr tablet    WELLBUTRIN SR    60 tablet    Take 1 tablet once daily and increase to 1 tablet twice daily after 4 to 7 days    Tobacco use disorder       calcium Citrate-vitamin D 500-400 MG-UNIT Chew     60 tablet    Take 500 mg by mouth 2 times daily    Unspecified intestinal malabsorption, Status post bariatric surgery       cyanocobalamin 1000 MCG/ML injection    VITAMIN B12    3 mL    Inject 1 mL (1,000 mcg) into the muscle every 30 days    Bariatric surgery status       DAILY MULTIVITAMIN PO      Take  by mouth. Take 2 chewable tablets daily    Bariatric surgery status       diclofenac 1 % Gel topical gel    VOLTAREN    100 g    Apply 4 grams to knees or 2 grams to hands four times daily using enclosed dosing card.    Acute left-sided low back pain with left-sided sciatica       esomeprazole 20 MG CR capsule    nexIUM    84 capsule    Take 1 capsule (20 mg) by mouth 2 times daily Take 30-60 minutes before eating.    Gastroesophageal reflux disease, esophagitis presence not specified       * FLEXERIL PO           * cyclobenzaprine 10 MG tablet    FLEXERIL    30 tablet    Take 0.5-1 tablets (5-10 mg) by mouth 3 times daily as needed for muscle spasms    Acute left-sided low back pain with left-sided sciatica       methylPREDNISolone 4 MG tablet    MEDROL DOSEPAK    21 tablet    Follow package instructions    Acute left-sided low back pain with  left-sided sciatica       order for DME     1 each    Injection Supplies for Vitamin B12: 3cc syringes w/ 27 gauge needles, 1/2 inch length    Bariatric surgery status       order for DME     1 each    Equipment being ordered: crutches    Acute left-sided low back pain with left-sided sciatica       PARoxetine 40 MG tablet    PAXIL    90 tablet    Take 0.5 tablets (20 mg) by mouth At Bedtime    Anxiety       UNJURY Powd     2 Bottle    Take 21 g by mouth daily Unjury 21 gram-100 kcal/27 gram Powder    Bariatric surgery status       * Notice:  This list has 2 medication(s) that are the same as other medications prescribed for you. Read the directions carefully, and ask your doctor or other care provider to review them with you.

## 2018-01-29 NOTE — LETTER
2018         RE: Yvonne Zarco  [address]  [facility]  [place] [address]        Dear Colleague,    Thank you for referring your patient, Yvonne Zarco, to the [facility] [place]. Please see a copy of my visit note below.    Sports Medicine Clinic Visit - Interim History 2018    PCP: Rima Mccollum    Yvonne Zarco is a 42 year old female who is seen in f/u up for left sided pain. Since last visit on 18 patient has done PT and experienced increase in lower leg symptoms so had an MRI of the lumbar spine. She feels that her left knee is unstable and radiates pain up towards the hip.  Having less radicular symptoms currently, however, still present.  Pain is more localized to the knee.    Symptoms are better with: brace  Symptoms are worse with: standing, stairs and weight bearing, putting shoes/socks on  Additional Features:   Positive: locking, instability and shooting pain into left hip/groin   Negative: swelling, bruising, popping, grinding, catching, paresthesias and numbness    Social History: desk job    Review of Systems  Skin: no bruising, no swelling  Musculoskeletal: as above  Neurologic: no numbness, paresthesias  Remainder of review of systems is negative including constitutional, CV, pulmonary, GI, except as noted in HPI or medical history.    Patient's current problem list, past medical and surgical history, and family history were reviewed.    Patient Active Problem List   Diagnosis     GERD (gastroesophageal reflux disease)     Bariatric surgery status     Anxiety     Tobacco use disorder     Lumbago     Past Medical History:   Diagnosis Date     Depressive disorder forever     Gastro-oesophageal reflux disease      Hiatal hernia      Hypertension      Past Surgical History:   Procedure Laterality Date      SECTION      x2     CHOLECYSTECTOMY       ESOPHAGOSCOPY, GASTROSCOPY, DUODENOSCOPY (EGD), COMBINED  2012     "Procedure: COMBINED ESOPHAGOSCOPY, GASTROSCOPY, DUODENOSCOPY (EGD), BIOPSY SINGLE OR MULTIPLE;;  Surgeon: Scott Seth MD;  Location:  GI     GENITOURINARY SURGERY  ??    bladder sling with hysterectomy early      GYN SURGERY      tubal ligation     hysterectomy with bladder sling      left ovaries     HYSTERECTOMY, PAP NO LONGER INDICATED       LAPAROSCOPIC GASTRIC SLEEVE  5/10/2013    Procedure: LAPAROSCOPIC GASTRIC SLEEVE;  Laparoscopic Sleeve Gastrectomy with hiatal hernia repair;  Surgeon: Scott Seth MD;  [facility]  [facility]       [facility]THOPEDIC [facility]      right foot surgery     Family History   Problem Relation Age of Onset     Hypertension Father           DIABETES Mother      diet controlled     Hypertension Mother      meds     Coronary Artery Disease Mother 67     quadruple bypass     Hypertension Sister      Anxiety Disorder Sister      Anxiety Disorder Daughter      Anxiety Disorder Niece      Hypertension Sister      meds     Anxiety Disorder Sister      CANCER No family hx of      CEREBROVASCULAR DISEASE No family hx of      Thyroid Disease No family hx of      Glaucoma No family hx of      Macular Degeneration No family hx of        Objective  /84 (BP Location: Right arm, Patient Position: Sitting, Cuff Size: Adult Large)  Ht 5' 1\" (1.549 m)  Wt 154 lb (69.9 kg)  BMI 29.1 kg/m2    GENERAL APPEARANCE: healthy, alert and no distress   GAIT: slightly antalgic  SKIN: no suspicious lesions or rashes  HEENT: Sclera clear, anicteric  CV: good peripheral pulses  RESP: Breathing not labored  NEURO: Normal strength and tone, mentation intact and speech normal  PSYCH:  mentation appears normal and affect normal/bright    Bilateral hip exam    Inspection:      no edema or ecchymosis in hip area    Tender:      none bilateral    Non Tender:      remainder of the hip area bilateral    ROM:     Full active and passive ROM  bilateral    Strength:      " flexion 5/5 bilateral       abduction 5/5 bilateral       adduction 5/5 bilateral    Sensation:      grossly intact in hip and thigh    Special Tests:      neg (-) WILL left       neg (-) FADIR left      Neg (-) SLR left    Bilateral Knee exam  Inspection:       no bruising, no swelling     Patella:      Normal patellar tracking noted through range of motion bilateral     Tender:       Throughout patella and lateral knee - both femoral condyle and tibial plateau     Non Tender:      remainder of knee area bilateral     Knee ROM:      Full active and passive ROM with flexion and extension bilateral - pain with end range of motion bilaterally     Strength:      5/5 with knee extension bilateral     Special Tests:     neg (-) Kaila left       neg (-) Lachmans left       neg (-) anterior drawer left       neg (-) posterior drawer left       neg (-) varus at 0 deg and 30 deg left       neg (-) valgus at 0 deg and 30 deg left     Neurovascular:      2+ peripheral pulses bilaterally and brisk capillary refill       sensation grossly intact    Radiology  I ordered, visualized and reviewed these images with the patient  Recent Results (from the past 744 hour(s))   XR Pelvis and Hip Left 1 View    Narrative    PELVIS AND LEFT HIP ONE VIEW  1/8/2018 1:46 PM    HISTORY:  Left hip pain after injury.    COMPARISON:  None.    FINDINGS:  No fracture or osseous lesion is seen. The joint spaces are  well preserved. No adjacent soft tissue pathology is seen.      Impression    IMPRESSION:  Unremarkable examination.    HSAYNE SALAS MD   XR Knee Standing AP Bilat [place] Bilat Lat Left    Narrative    KNEE STANDING AP BILATERAL [place] BILATERAL LATERAL LEFT 1/8/2018  1:48 PM     HISTORY:  Injury of left knee, initial encounter.    COMPARISON: Three views of the right knee 3/20/2017.      Impression    IMPRESSION: Three views of the left knee show no bony or soft tissue  abnormality. No joint space effusion. Two views of the  right knee are  unremarkable and unchanged.    PATY TOMLINSON MD   MR Lumbar Spine w/o Contrast   Result Value    Radiologist flags Large pelvic mass. (Urgent)    Narrative    MR LUMBAR SPINE WITHOUT CONTRAST   1/19/2018 9:24 AM    HISTORY: Left-sided back and leg pain with tingling, numbness and  weakness for 2 weeks after an injury.    TECHNIQUE: Sagittal T1 and T2 and STIR, axial proton density and T2  images.    COMPARISON: None.    FINDINGS: Five functional lumbar vertebral segments are assumed. The  caudal thecal sac contents appear intrinsically normal. Alignment in  the sagittal plane is normal and vertebral bone marrow signal is  unremarkable.    T12-L1: Normal.    L1-L2: Normal.    L2-L3: Normal.    L3-L4: Normal.    L4-L5: Normal.    L5-S1: Very early signal loss. Minimal posterior disc bulging and no  disc protrusion or central or foraminal stenosis. Posterior facets are  unremarkable.    Paraspinal soft tissues appear normal. There is a large incompletely  visualized mass in the posterior mid pelvis with signal  characteristics consistent with fat. This likely represents an ovarian  dermoid or teratoma. Correlation with pelvic ultrasound is  recommended. Incidental note of a 2 cm right ovarian cyst.      Impression    IMPRESSION:   1. Very early degenerative disc disease at L5-S1 without impingement  on neural structures.  2. Large fat-containing mass in the pelvis, likely an ovarian dermoid  or teratoma. Recommend correlation with ultrasound.    [Access Center: Large pelvic mass.]    This report will be copied to the [facility] to ensure a  provider acknowledges the finding. Access Center is available Monday  through Friday 8am-3:30 pm.     PATY TOMLINSON MD   US Pelvic Complete with Transvaginal    Narrative    ULTRASOUND PELVIC COMPLETE WITH TRANSVAGINAL January 23, 2018 4:48 PM     HISTORY: Pelvic mass.     COMPARISON: Lumbar spine MRI 1/19/2018.    FINDINGS: Transvaginal images were  performed to better evaluate the  patient's uterus, ovaries and endometrial stripe.    The uterus is surgically absent. The right ovary measures 2.6 x 2.1 x  2.1 cm and contains a small complex appearing cystic lesion measuring  1.8 cm. The left ovary measures 5.8 x 4.2 x 6.3 cm. There is a large  complex cystic lesion without color Doppler flow involving the left  ovary nearly the size of the entire left ovary. Color Doppler flow is  noted within the right ovary. Left ovarian color flow is difficult to  confirm due to the large complex cystic lesion. Only a thin rim of  ovarian tissue is visualized on some of the ultrasound images. No  adnexal masses are present. No free pelvic fluid is present.      Impression    IMPRESSION: Large complex cystic lesion left ovary without associated  color Doppler flow most likely an endometrioma or large hemorrhagic  cyst as seen on recent lumbar spine MRI. Complex appearing cystic  lesion right ovary is also noted. Prior hysterectomy. Follow-up  ultrasound in two or three months recommended to assess for interval  resolution.    JO LAMAS MD       Assessment:  1. Injury of left knee, initial encounter      Symptoms have localized to left knee.  Prior presentation more consistent with hip/lumbar etiology.  Reviewed Lumbar MRI - no nerve impingement.  Discussed MRI of knee next step.  Discussed supportive care in the interim including rest, ice, elevation, bracing for support and crutches for pain.  Follow up after imaging to review results and further treatment.    Plan:  - Today's Plan of Care:  MRI of the left knee  Continue bracing, crutches, ice  Follow-up with OB  Return to PT    Follow Up: in clinic after MRI (wait at least 1-2 days)    Concerning signs and symptoms were reviewed.  The patient expressed understanding of this management plan and all questions were answered at this time.    Maritza Morin MD CAQ  Primary Care Sports Medicine  [facility]    Again, thank you for allowing me to participate in the care of your patient.        Sincerely,        Maritza Morin MD

## 2018-01-29 NOTE — TELEPHONE ENCOUNTER
She called and I spoke with her and scheduled her an appointment for today at 440.    James Ignacio, ATC

## 2018-01-29 NOTE — TELEPHONE ENCOUNTER
I called and left voicemail with Yvonne and left voicemail (non-descript) to call back regarding information below.    James Ignacio, ATC

## 2018-01-30 ENCOUNTER — RADIANT APPOINTMENT (OUTPATIENT)
Dept: MRI IMAGING | Facility: CLINIC | Age: 43
End: 2018-01-30
Attending: PEDIATRICS
Payer: COMMERCIAL

## 2018-01-30 ENCOUNTER — OFFICE VISIT (OUTPATIENT)
Dept: OBGYN | Facility: CLINIC | Age: 43
End: 2018-01-30
Payer: COMMERCIAL

## 2018-01-30 VITALS
WEIGHT: 150 LBS | DIASTOLIC BLOOD PRESSURE: 80 MMHG | HEART RATE: 102 BPM | TEMPERATURE: 98.2 F | SYSTOLIC BLOOD PRESSURE: 125 MMHG | BODY MASS INDEX: 28.34 KG/M2

## 2018-01-30 DIAGNOSIS — S89.92XA INJURY OF LEFT KNEE, INITIAL ENCOUNTER: ICD-10-CM

## 2018-01-30 DIAGNOSIS — N83.202 LEFT OVARIAN CYST: Primary | ICD-10-CM

## 2018-01-30 PROCEDURE — 99243 OFF/OP CNSLTJ NEW/EST LOW 30: CPT | Performed by: OBSTETRICS & GYNECOLOGY

## 2018-01-30 PROCEDURE — 73721 MRI JNT OF LWR EXTRE W/O DYE: CPT | Mod: TC

## 2018-01-30 NOTE — PROGRESS NOTES
"GYN Clinic Consultation    Date of visit: 2018     Chief Complaint: ovarian cysts    HPI:   Yvonne Zarco is a 42 year old  who I was asked to see in consultation by Yazmin Egan MD for evaluation of ovarian cysts, an incidental finding on lumbar spine MRI.    Yvonne reports this all started when she fell on New Years this year.  She's had left leg/knee pain, parasthesias and decreased LE sensation since her fall, so has seen quite a few physicians and had multiple imaging studies to evaluate.    She has no symptoms of ovarian cysts.  No abdominal pain, cramping.  No urinary symptoms.  No issues with bowel movements.    She has an abdominal hysterectomy for menorrhagia and a bicornuate uterus, and reported had endometriosis found at that time.  She had cramping/pain prior to to hysterectomy, but nothing remarkable since then.    Obstetric History:   Obstetric History       T2      L2     SAB4   TAB0   Ectopic0   Multiple0   Live Births2       # Outcome Date GA Lbr Russ/2nd Weight Sex Delivery Anes PTL Lv   7 SAB      SAB      6 SAB      SAB      5 SAB      SAB      4 SAB      SAB      3       STILLBIRTH   FD   2 Term      CS-Unspec   YAMIL   1 Term      CS-Unspec   YAMIL        Obstetric history significant for:   1. Two full term  deliveries  2. One stillbirth at \"six months\"    Patient denies history of  gestational diabetes,  gestational hypertension,   delivery     Gynecologic History:  No LMP recorded. Patient has had a hysterectomy.  STI history: denies  Last Pap: denies  History of abnormal pap: denies  History of cervical procedures: denies   The patient is sexually active with male partner.   She has the following concerns about sexual function: none   She reports she is satisfied in her relationship.         Past Medical History:  Past Medical History:   Diagnosis Date     Depressive disorder forever     Endometriosis     found at time of abdominal hysterectomy     " Gastro-oesophageal reflux disease      Hiatal hernia      Hypertension        Past Surgical History:  Past Surgical History:   Procedure Laterality Date      SECTION      x2     CHOLECYSTECTOMY       ESOPHAGOSCOPY, GASTROSCOPY, DUODENOSCOPY (EGD), COMBINED  2012    Procedure: COMBINED ESOPHAGOSCOPY, GASTROSCOPY, DUODENOSCOPY (EGD), BIOPSY SINGLE OR MULTIPLE;;  Surgeon: Scott Seth MD;  Location:  GI     GENITOURINARY SURGERY  ??    bladder sling with hysterectomy early      GYN SURGERY      tubal ligation     hysterectomy with bladder sling      left ovaries     HYSTERECTOMY, PAP NO LONGER INDICATED      Abdominal hyst     LAPAROSCOPIC GASTRIC SLEEVE  5/10/2013    Procedure: LAPAROSCOPIC GASTRIC SLEEVE;  Laparoscopic Sleeve Gastrectomy with hiatal hernia repair;  Surgeon: Scott Seth MD;  Location:  OR     [facility]IL[facility]       ORTHOPEDIC SURGERY      right foot surgery         Medications:  Current Outpatient Prescriptions   Medication     cyclobenzaprine (FLEXERIL) 10 MG tablet     diclofenac (VOLTAREN) 1 % GEL topical gel     Cyclobenzaprine HCl (FLEXERIL PO)     esomeprazole (NEXIUM) 20 MG CR capsule     PARoxetine (PAXIL) 40 MG tablet     Protein (UNJURY) POWD     buPROPion (WELLBUTRIN SR) 150 MG 12 hr tablet     cyanocobalamin (VITAMIN B12) 1000 MCG/ML injection     ORDER FOR DME     Multiple Vitamin (DAILY MULTIVITAMIN PO)     traMADol (ULTRAM) 50 MG tablet     methylPREDNISolone (MEDROL DOSEPAK) 4 MG tablet     order for DME     benzoyl peroxide 5 % LIQD     calcium Citrate-vitamin D 500-400 MG-UNIT CHEW     No current facility-administered medications for this visit.        Allergy:  Allergies   Allergen Reactions     Ciprofloxacin Itching     Patient denies food, latex or environmental allergies.     Social History:  Tobacco use: denies  Alcohol use: denies  Recreational drug use: denies  Relationship status is: .    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   She feels safe in her current relationship.    Family History   Problem Relation Age of Onset     Hypertension Father           DIABETES Mother      diet controlled     Hypertension Mother      meds     Coronary Artery Disease Mother 67     quadruple bypass     Hypertension Sister      Anxiety Disorder Sister      Anxiety Disorder Daughter      Anxiety Disorder Niece      Hypertension Sister      meds     Anxiety Disorder Sister      CANCER No family hx of      CEREBROVASCULAR DISEASE No family hx of      Thyroid Disease No family hx of      Glaucoma No family hx of      Macular Degeneration No family hx of      Denies hx of Breast, ovarian, or colon cancer, sudden death, early cardiovascular or thomboembolic disease. Denies hx or congenital anomalies and autoimmune diseases.    Past Anesthesia History:  Complications with general/regional anesthesia: none.     Review of Systems:  Skin: negative for, lumps or bumps  Eyes: negative for, double vision  Ears/Nose/Throat: negative for, hearing loss, deafness  Respiratory: No shortness of breath, dyspnea on exertion, cough, or hemoptysis  Cardiovascular: negative for, palpitations, tachycardia and chest pain  Gastrointestinal: negative for, nausea, vomiting, abdominal pain, melena, hematochezia, constipation and diarrhea  Genitourinary: negative for, frequency, urgency, hematuria, sexually transmitted disease and vaginal discharge  Neurologic: negative for, syncope, stroke and seizures  Psychiatric: negative for, anxiety and depression  Hematologic/Lymphatic/Immunologic: negative for and bleeding disorder  Endocrine: negative for, thyroid disorder and diabetes    Physical Exam:  Vitals:    18 1444   BP: 125/80   Pulse: 102   Temp: 98.2  F (36.8  C)   TempSrc: Oral   Weight: 150 lb (68 kg)     Body mass index is 28.34 kg/(m^2).    Gen: healthy, alert, active, no  distress  Psych:  Appropriate mood and affect  Neck: supple, no adenopathy, normal thyroid  CV: regular rate and rhythm  Resp: lungs clear to ascultation bilaterally  Breast: deferred  Abd: soft, non-tender, non-distended, no masses, no hernias, multiple healed scars including Pfannenstiel and laparoscopic  Extremities: nontender, no edema  :   - external genitalia: vulva and perineum are normal without lesion, mass or erythema  - urethra: well supported urethra, no hypermobility, normal Skenes and Bartholins.   - bladder: no tenderness, no masses  - vagina: intact, rugated mucosa without lesions or abnormal discharge. No prolapse.   - cervix: surgically absent   - uterus: surgically absent  - adnexa: no masses or tenderness appreciated on right side.  Approximately 5cm non-tender adnexal mass on left  - rectal: deferred      Assessment:  Yvonne Zarco is a 42 year old  who presents in consultation for ovarian cysts.     Plan:  Discussed that left ovarian cyst appears that it could be hemorrhagic cyst or endometrioma.  If hemorrhagic, will likely resolve on its own, but endometriomas typically do not resolve.    While cannot rule out cancer without pathology, based on her age, personal and family history, as well as appearance on ultrasound, malignancy is very unlikely.    Will follow-up cysts with ultrasound.  If the same or larger, will discuss surgical removal.  Discussed that, while this surgery is typically done laparoscopically, we may need to make an incision depending on scarring in her abdomen.  Her surgical history, as well as her reported history of endometriosis at the time of hysterectomy increases my concern for intraabdominal scarring.    Follow up: 6w with US and visit.     Maritza Fowler MD

## 2018-01-30 NOTE — NURSING NOTE
"Chief Complaint   Patient presents with     RECHECK     mass in abdomen       Initial /80  Pulse 102  Temp 98.2  F (36.8  C) (Oral)  Wt 150 lb (68 kg)  BMI 28.34 kg/m2 Estimated body mass index is 28.34 kg/(m^2) as calculated from the following:    Height as of 1/29/18: 5' 1\" (1.549 m).    Weight as of this encounter: 150 lb (68 kg).  BP completed using cuff size: regular    No obstetric history on file.    The following HM Due: NONE      The following patient reported/Care Every where data was sent to:  P ABSTRACT QUALITY INITIATIVES [[id number]]  PACO [place]           "

## 2018-01-30 NOTE — MR AVS SNAPSHOT
After Visit Summary   1/30/2018    Yvonne Zarco    MRN: [id number]           Patient Information     Date Of Birth          1975        Visit Information        Provider Department      1/30/2018 2:30 PM Maritza Fowler MD [facility]        Today's Diagnoses     Left ovarian cyst    -  1       Follow-ups after your visit        Who to contact     If you have questions or need follow up information about today's clinic visit or your schedule please contact [facility] directly at [phone].  Normal or non-critical lab and imaging results will be communicated to you by Pathgatherhart, letter or phone within 4 business days after the clinic has received the results. If you do not hear from us within 7 days, please contact the clinic through Bebitost or phone. If you have a critical or abnormal lab result, we will notify you by phone as soon as possible.  Submit refill requests through SellABand or call your pharmacy and they will forward the refill request to us. Please allow 3 business days for your refill to be completed.          Additional Information About Your Visit        MyChart Information     SellABand gives you secure access to your electronic health record. If you see a primary care provider, you can also send messages to your care team and make appointments. If you have questions, please call your primary care clinic.  If you do not have a primary care provider, please call [phone] and they will assist you.        Care EveryWhere ID     This is your Care EveryWhere ID. This could be used by other organizations to access your [facility] medical records  [id number]        Your Vitals Were     Pulse Temperature BMI (Body Mass Index)             102 98.2  F (36.8  C) (Oral) 28.34 kg/m2          Blood Pressure from Last 3 Encounters:   01/30/18 125/80   01/29/18 129/84   01/25/18 128/80    Weight from Last 3 Encounters:   01/30/18 150 lb (68 kg)   01/29/18  154 lb (69.9 kg)   01/25/18 154 lb (69.9 kg)               Primary Care Provider Office Phone # Fax #    [facility] [phone] [address]  [place] [address]        Equal Access to Services     JAGDISH QUEZADA : Hadii aad ku hadasho Soomaali, waaxda luqadaha, qaybta kaalmada adeegyada, waxay idiin hayaan adeeg paula la'pinan ah. So [facility] [phone].    ATENCIÓN: Si habla español, tiene a javier disposición servicios gratuitos de asistencia lingüística. [facility] [phone].    We comply with applicable federal civil rights laws and [place] laws. We do not discriminate on the basis of race, color, national origin, age, disability, sex, sexual orientation, or gender identity.            Thank you!     Thank you for choosing [facility]  for your care. Our goal is always to provide you with excellent care. Hearing back from our patients is one way we can continue to improve our services. Please take a few minutes to complete the written survey that you may receive in the mail after your visit with us. Thank you!             Your Updated Medication List - Protect others around you: Learn how to safely use, store and throw away your medicines at www.disposemymeds.org.          This list is accurate as of 1/30/18 11:59 PM.  Always use your most recent med list.                   Brand Name Dispense Instructions for use Diagnosis    benzoyl peroxide 5 % Liqd     120 mL    Apply to entire face and then wash off after 5 min daily. Then apply topical clindamycin. May bleach towels.    Acne       buPROPion 150 MG 12 hr tablet    WELLBUTRIN SR    60 tablet    Take 1 tablet once daily and increase to 1 tablet twice daily after 4 to 7 days    Tobacco use disorder       calcium Citrate-vitamin D 500-400 MG-UNIT Chew     60 tablet    Take 500 mg by mouth 2 times daily    Unspecified intestinal malabsorption, Status post bariatric surgery       cyanocobalamin 1000 MCG/ML  injection    VITAMIN B12    3 mL    Inject 1 mL (1,000 mcg) into the muscle every 30 days    Bariatric surgery status       DAILY MULTIVITAMIN PO      Take  by mouth. Take 2 chewable tablets daily    Bariatric surgery status       diclofenac 1 % Gel topical gel    VOLTAREN    100 g    Apply 4 grams to knees or 2 grams to hands four times daily using enclosed dosing card.    Acute left-sided low back pain with left-sided sciatica       esomeprazole 20 MG CR capsule    nexIUM    84 capsule    Take 1 capsule (20 mg) by mouth 2 times daily Take 30-60 minutes before eating.    Gastroesophageal reflux disease, esophagitis presence not specified       * FLEXERIL PO           * cyclobenzaprine 10 MG tablet    FLEXERIL    30 tablet    Take 0.5-1 tablets (5-10 mg) by mouth 3 times daily as needed for muscle spasms    Acute left-sided low back pain with left-sided sciatica       methylPREDNISolone 4 MG tablet    MEDROL DOSEPAK    21 tablet    Follow package instructions    Acute left-sided low back pain with left-sided sciatica       order for DME     1 each    Injection Supplies for Vitamin B12: 3cc syringes w/ 27 gauge needles, 1/2 inch length    Bariatric surgery status       order for DME     1 each    Equipment being ordered: crutches    Acute left-sided low back pain with left-sided sciatica       PARoxetine 40 MG tablet    PAXIL    90 tablet    Take 0.5 tablets (20 mg) by mouth At Bedtime    Anxiety       UNJURY Powd     2 Bottle    Take 21 g by mouth daily Unjury 21 gram-100 kcal/27 gram Powder    Bariatric surgery status       * Notice:  This list has 2 medication(s) that are the same as other medications prescribed for you. Read the directions carefully, and ask your doctor or other care provider to review them with you.

## 2018-01-31 ENCOUNTER — TELEPHONE (OUTPATIENT)
Dept: ORTHOPEDICS | Facility: CLINIC | Age: 43
End: 2018-01-31

## 2018-01-31 NOTE — TELEPHONE ENCOUNTER
Patient LVM returning call. If calling before 3:30 please call at work [phone]. If after 4:30, please call cell [phone].

## 2018-01-31 NOTE — TELEPHONE ENCOUNTER
Results released via WePlann - please call with results as well to determine if she has additional questions.    Maritza Morin MD

## 2018-01-31 NOTE — TELEPHONE ENCOUNTER
Please call patient with MRI results:    MR KNEE LEFT WITHOUT CONTRAST 1/30/2018 7:32 PM     HISTORY: Injury of left knee, initial encounter, knee pain.      TECHNIQUE:  Axial and coronal T2 with fat suppression. Coronal T1.  Sagittal dual echo T2.     FINDINGS:   Medial Meniscus: No tear, displaced fragment, or extrusion.         Lateral Meniscus: No tear, displaced fragment, or extrusion.         Anterior Cruciate Ligament: Unremarkable. No sprain or tear  identified.      Posterior Cruciate Ligament: Unremarkable. No sprain or tear  identified.      Medial Collateral Ligament: No sprain or tear identified.     Lateral Collateral Ligament Complex, Popliteus Tendon: The iliotibial  band, fibular collateral ligament, biceps femoris tendon, and  popliteus tendon are intact.     Osseous and Cartilaginous Structures: Prominent bone marrow edema of  the lateral femoral condyle. I suspect this relates to a subtle  subchondral insufficiency fracture, which is questioned on series 3  image 20. The overall pattern is more suggestive of an insufficiency  fracture, versus an isolated bone contusion. No chondromalacia  identified.     Extensor Mechanism: The quadriceps and patellar tendons are intact.  The medial and lateral patellar retinacula appear unremarkable.     Joint Space: Mild joint effusion. No definite loose bodies  appreciated.     Additional Findings: No Baker's cyst.  No semimembranosus-tibial  collateral ligament or pes anserine bursitis. Nonspecific soft tissue  edema.             IMPRESSION:  1. Prominent lateral femoral condyle edema. This is felt to most  likely relate to subtle underlying subchondral insufficiency fracture.  2. Mild effusion.    In Summary:  - Lateral Femoral Condyle insufficiency fracture    I Recommend:  - Non weight bearing on crutches with follow up in 2 weeks to evaluate    Maritza Morin MD

## 2018-02-01 ENCOUNTER — MYC MEDICAL ADVICE (OUTPATIENT)
Dept: OBGYN | Facility: CLINIC | Age: 43
End: 2018-02-01

## 2018-02-01 NOTE — TELEPHONE ENCOUNTER
Called and spoke with Yvonne.  Will move up f/u US to early March and plan surgery if cysts remain.    Maritza Fowler MD

## 2018-02-01 NOTE — TELEPHONE ENCOUNTER
Spoke with patient and relayed MRI results and recommendations. She has crutches and will follow-up in 2 weeks. Let her know that I also saw her DutyCalculatorhart message about pain medication and will forward it to Dr. Morin and get back to her on that. No further questions.    Jose D Roe, ATC

## 2018-02-08 ENCOUNTER — TELEPHONE (OUTPATIENT)
Dept: ORTHOPEDICS | Facility: CLINIC | Age: 43
End: 2018-02-08

## 2018-02-12 ENCOUNTER — OFFICE VISIT (OUTPATIENT)
Dept: ORTHOPEDICS | Facility: CLINIC | Age: 43
End: 2018-02-12
Payer: COMMERCIAL

## 2018-02-12 ENCOUNTER — MYC MEDICAL ADVICE (OUTPATIENT)
Dept: FAMILY MEDICINE | Facility: CLINIC | Age: 43
End: 2018-02-12

## 2018-02-12 ENCOUNTER — TELEPHONE (OUTPATIENT)
Dept: ORTHOPEDICS | Facility: CLINIC | Age: 43
End: 2018-02-12

## 2018-02-12 VITALS
SYSTOLIC BLOOD PRESSURE: 118 MMHG | HEIGHT: 61 IN | WEIGHT: 150 LBS | BODY MASS INDEX: 28.32 KG/M2 | DIASTOLIC BLOOD PRESSURE: 62 MMHG

## 2018-02-12 DIAGNOSIS — M84.452D INSUFFICIENCY FRACTURE OF LEFT FEMUR WITH ROUTINE HEALING, SUBSEQUENT ENCOUNTER: Primary | ICD-10-CM

## 2018-02-12 DIAGNOSIS — M25.562 LEFT KNEE PAIN: Primary | ICD-10-CM

## 2018-02-12 PROCEDURE — 99213 OFFICE O/P EST LOW 20 MIN: CPT | Performed by: PEDIATRICS

## 2018-02-12 NOTE — TELEPHONE ENCOUNTER
Patient has been under the care of Dr. Morin for left knee injury.  Call center contacted triage for assistance scheduling as patient would like a second opinion.  Call center was advised to have patient be seen by Sports Medicine provider.    Peggy Noel LPN

## 2018-02-12 NOTE — LETTER
February 12, 2018      Yvonne Zarco  [address] [id number]        To Whom It May Concern:    Yvonne Zarco was seen in our clinic today for a knee injury. She may return to work with the following: seated work only, allow for use of crutches, consider closer parking in the short term. She is to follow-up in clinic for re-evaluation in ~2 weeks.      Sincerely,        Maritza Morin MD

## 2018-02-12 NOTE — PROGRESS NOTES
Sports Medicine Clinic Visit - Interim History 2018      PCP: Rima Mccollumbraydon Zarco is a 42 year old female who is seen in f/u up for Insufficiency fracture of left femur with routine healing, subsequent encounter. Since last visit on 18, patient has had and MRI and is here to review results. She has been non weight bearing on that leg for the past 1.5 weeks since results and reports good improvement.  Wearing a knee brace which gives her more stability.  Would also like to discuss work restrictions.  - Has not had specific bone health work up, is s/p bariatric surgery.    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    Patient's current problem list, past medical and surgical history, and family history were reviewed.    Patient Active Problem List   Diagnosis     GERD (gastroesophageal reflux disease)     Bariatric surgery status     Anxiety     Tobacco use disorder     Lumbago     Past Medical History:   Diagnosis Date     Depressive disorder forever     Endometriosis     found at time of abdominal hysterectomy     Gastro-oesophageal reflux disease      Hiatal hernia      Hypertension      Past Surgical History:   Procedure Laterality Date      SECTION      x2     CHOLECYSTECTOMY       ESOPHAGOSCOPY, GASTROSCOPY, DUODENOSCOPY (EGD), COMBINED  2012    Procedure: COMBINED ESOPHAGOSCOPY, GASTROSCOPY, DUODENOSCOPY (EGD), BIOPSY SINGLE OR MULTIPLE;;  Surgeon: Scott Seth MD;  Location:  GI     GENITOURINARY SURGERY  ??    bladder sling with hysterectomy early      GYN SURGERY      tubal ligation     hysterectomy with bladder sling      left ovaries     HYSTERECTOMY, PAP NO LONGER INDICATED      Abdominal hyst  "    LAPAROSCOPIC GASTRIC SLEEVE  5/10/2013    Procedure: LAPAROSCOPIC GASTRIC SLEEVE;  Laparoscopic Sleeve Gastrectomy with hiatal hernia repair;  Surgeon: Scott Seth MD;  Location:  OR     [facility] BILATERAL       ORTHOPEDIC SURGERY      right foot surgery     Family History   Problem Relation Age of Onset     Hypertension Father           DIABETES Mother      diet controlled     Hypertension Mother      meds     Coronary Artery Disease Mother 67     quadruple bypass     Hypertension Sister      Anxiety Disorder Sister      Anxiety Disorder Daughter      Anxiety Disorder Niece      Hypertension Sister      meds     Anxiety Disorder Sister      CANCER No family hx of      CEREBROVASCULAR DISEASE No family hx of      Thyroid Disease No family hx of      Glaucoma No family hx of      Macular Degeneration No family hx of        Objective  /62 (BP Location: Left arm, Patient Position: Sitting, Cuff Size: Adult Large)  Ht 5' 1\" (1.549 m)  Wt 150 lb (68 kg)  BMI 28.34 kg/m2    GENERAL APPEARANCE: healthy, alert and no distress   GAIT: antalgic and crutches  SKIN: no suspicious lesions or rashes  HEENT: Sclera clear, anicteric  CV: good peripheral pulses  RESP: Breathing not labored  NEURO: Normal strength and tone, mentation intact and speech normal  PSYCH:  mentation appears normal and affect normal/bright    Bilateral Knee exam  Inspection:      no bruising, no swelling      Patella:      Normal patellar tracking noted through range of motion bilateral      Tender:      mild lateral knee      Non Tender:      remainder of knee area bilateral      Knee ROM:      Slightly decreased flexion of left knee compared to right      Strength:      5/5 with knee extension bilateral      Special Tests:     neg (-) Kaila left       neg (-) Lachmans left       neg (-) anterior drawer left       neg (-) posterior drawer left       neg (-) varus at 0 deg and 30 deg left       neg (-) valgus at 0 deg " and 30 deg left      Neurovascular:      2+ peripheral pulses bilaterally and brisk capillary refill       sensation grossly intact    Radiology  I ordered, visualized and reviewed these images with the patient  MR KNEE LEFT WITHOUT CONTRAST 1/30/2018 7:32 PM     HISTORY: Injury of left knee, initial encounter, knee pain.      TECHNIQUE:  Axial and coronal T2 with fat suppression. Coronal T1.  Sagittal dual echo T2.     FINDINGS:   Medial Meniscus: No tear, displaced fragment, or extrusion.         Lateral Meniscus: No tear, displaced fragment, or extrusion.         Anterior Cruciate Ligament: Unremarkable. No sprain or tear  identified.      Posterior Cruciate Ligament: Unremarkable. No sprain or tear  identified.      Medial Collateral Ligament: No sprain or tear identified.     Lateral Collateral Ligament Complex, Popliteus Tendon: The iliotibial  band, fibular collateral ligament, biceps femoris tendon, and  popliteus tendon are intact.     Osseous and Cartilaginous Structures: Prominent bone marrow edema of  the lateral femoral condyle. I suspect this relates to a subtle  subchondral insufficiency fracture, which is questioned on series 3  image 20. The overall pattern is more suggestive of an insufficiency  fracture, versus an isolated bone contusion. No chondromalacia  identified.     Extensor Mechanism: The quadriceps and patellar tendons are intact.  The medial and lateral patellar retinacula appear unremarkable.     Joint Space: Mild joint effusion. No definite loose bodies  appreciated.     Additional Findings: No Baker's cyst.  No semimembranosus-tibial  collateral ligament or pes anserine bursitis. Nonspecific soft tissue  edema.    IMPRESSION:  1. Prominent lateral femoral condyle edema. This is felt to most  likely relate to subtle underlying subchondral insufficiency fracture.  2. Mild effusion.     DK CRUM MD    Assessment:  1. Insufficiency fracture of left femur with routine healing,  subsequent encounter      Unclear if fracture is from initial injury, subsequent injuries or altered gait given prior injuries - initial exam was without knee tenderness.  Discussed typical treatment including non weight bearing until pain free then gradual weight bearing as tolerated.  Brace can help stabilize knee as well.  Potential role for PT in the future discussed.  Follow up in 2 weeks with x-rays.  - Follow up with PCP to discuss bone health and if further work up necessary.    Plan:  - Today's Plan of Care:  Work Restrictions  Teach crutch walking  Short term disability form  Fitness-for-duty form  Continue non-weightbearing  Follow-up with PCP to review bone health    Follow Up: 2 weeks in clinic with 2 view left knee xrays    Concerning signs and symptoms were reviewed.  The patient expressed understanding of this management plan and all questions were answered at this time.    Maritza Morin MD CAQ  Primary Care Sports Medicine  [facility]

## 2018-02-12 NOTE — MR AVS SNAPSHOT
After Visit Summary   2/12/2018    Yvonne Zarco    MRN: [id number]           Patient Information     Date Of Birth          1975        Visit Information        Provider Department      2/12/2018 11:20 AM Maritza Morin MD [facility]        Today's Diagnoses     Insufficiency fracture of left femur with routine healing, subsequent encounter    -  1      Care Instructions    Plan:  - Today's Plan of Care:  Work Restrictions  Teach crutch walking  Short term disability form  Fitness-for-duty form  Continue non-weightbearing  Follow-up with PCP to review bone health    Follow Up: 2 weeks in clinic with 2 view left knee xrays          Follow-ups after your visit        Your next 10 appointments already scheduled     Feb 12, 2018  1:45 PM CST   XR KNEE LEFT 3 VIEWS with [email or web address]   [facility] ([facility])    [address]  4th Floor  [facility] [id number] [phone]           Please bring a list of your current medicines to your exam. (Include vitamins, minerals and over-thecounter medicines.) Leave your valuables at home.  Tell your doctor if there is a chance you may be pregnant.  You do not need to do anything special for this exam.            Feb 12, 2018  2:00 PM CST   (Arrive by 1:45 PM)   New Patient Visit with Solitario Tavera MD   [facility] ([facility] [place])    [address]  5th Floor  [facility] [id number] [phone]            Mar 08, 2018  2:20 PM CST   US PELVIC COMPLETE W TRANSVAGINAL with RDUS1   [facility] ([facility])    [address] [place] [address]  [address]  [facility] [phone]           Please bring a list of your medicines (including vitamins, minerals and over-the-counter drugs). Also, tell your doctor about any allergies you may have. Wear comfortable clothes and leave your valuables at home.   Adults: Drink six 8-ounce glasses of fluid one hour before your exam. Do NOT empty your bladder.  If you need to empty your bladder before your exam, try to release only a little bit of urine. Then, drink another 8oz glass of fluid.  Children: Children who are potty trained should drink at least 4 cups (32 oz) of liquid 45 minutes to one hour prior to the exam. The child s bladder must be full in order to achieve a diagnostic exam. If your child is very uncomfortable or has an urgent need to pee, please notify a technologist; they will try to find out how much longer the wait may be and provide instructions to help relieve the pressure. Occasionally it is medically necessary to insert a urinary catheter to fill the bladder.  Please call the Imaging Department at your exam site with any questions.            Mar 08, 2018  3:00 PM CST   SHORT with Maritza Fowler MD   [facility] ([facility])    [address] [place] [id number] [phone]              Future tests that were ordered for you today     Open Future Orders        Priority Expected Expires Ordered    X-ray Knee Left 3 Views Routine 2/12/2018 2/12/2019 2/12/2018            Who to contact     If you have questions or need follow up information about today's clinic visit or your schedule please contact [facility] directly at [phone].  Normal or non-critical lab and imaging results will be communicated to you by MyChart, letter or phone within 4 business days after the clinic has received the results. If you do not hear from us within 7 days, please contact the clinic through MyChart or phone. If you have a critical or abnormal lab result, we will notify you by phone as soon as possible.  Submit refill requests through Networker or call your pharmacy and they will forward the refill request to us. Please allow 3 business days for your refill to be completed.     "      Additional Information About Your Visit        MyChart Information     Eveo gives you secure access to your electronic health record. If you see a primary care provider, you can also send messages to your care team and make appointments. If you have questions, please call your primary care clinic.  If you do not have a primary care provider, please call [phone] and they will assist you.        Care EveryWhere ID     This is your Care EveryWhere ID. This could be used by other organizations to access your [facility] medical records  [id number]        Your Vitals Were     Height BMI (Body Mass Index)                5' 1\" (1.549 m) 28.34 kg/m2           Blood Pressure from Last 3 Encounters:   02/12/18 118/62   01/30/18 125/80   01/29/18 129/84    Weight from Last 3 Encounters:   02/12/18 150 lb (68 kg)   01/30/18 150 lb (68 kg)   01/29/18 154 lb (69.9 kg)              Today, you had the following     No orders found for display       Primary Care Provider Office Phone # Fax #    Rima Mccollum, [facility] [phone]       [address] [address]        Equal Access to Services     [facility] PILAR AH: Hadii aad ku hadasho Soomaali, waaxda luqadaha, qaybta kaalmada adeegyada, surjit crouch haypinan erica mitchell la'aan ah. So [facility] [phone].    ATENCIÓN: Si habla español, tiene a javier disposición servicios gratuitos de asistencia lingüística. Gersoname al [phone].    We comply with applicable federal civil rights laws and [place] laws. We do not discriminate on the basis of race, color, national origin, age, disability, sex, sexual orientation, or gender identity.            Thank you!     Thank you for choosing [facility]  for your care. Our goal is always to provide you with excellent care. Hearing back from our patients is one way we can continue to improve our services. Please take a few minutes to complete the written survey that you may " receive in the mail after your visit with us. Thank you!             Your Updated Medication List - Protect others around you: Learn how to safely use, store and throw away your medicines at www.disposemymeds.org.          This list is accurate as of 2/12/18 12:08 PM.  Always use your most recent med list.                   Brand Name Dispense Instructions for use Diagnosis    benzoyl peroxide 5 % Liqd     120 mL    Apply to entire face and then wash off after 5 min daily. Then apply topical clindamycin. May bleach towels.    Acne       buPROPion 150 MG 12 hr tablet    WELLBUTRIN SR    60 tablet    Take 1 tablet once daily and increase to 1 tablet twice daily after 4 to 7 days    Tobacco use disorder       calcium Citrate-vitamin D 500-400 MG-UNIT Chew     60 tablet    Take 500 mg by mouth 2 times daily    Unspecified intestinal malabsorption, Status post bariatric surgery       cyanocobalamin 1000 MCG/ML injection    VITAMIN B12    3 mL    Inject 1 mL (1,000 mcg) into the muscle every 30 days    Bariatric surgery status       DAILY MULTIVITAMIN PO      Take  by mouth. Take 2 chewable tablets daily    Bariatric surgery status       diclofenac 1 % Gel topical gel    VOLTAREN    100 g    Apply 4 grams to knees or 2 grams to hands four times daily using enclosed dosing card.    Acute left-sided low back pain with left-sided sciatica       esomeprazole 20 MG CR capsule    nexIUM    84 capsule    Take 1 capsule (20 mg) by mouth 2 times daily Take 30-60 minutes before eating.    Gastroesophageal reflux disease, esophagitis presence not specified       * FLEXERIL PO           * cyclobenzaprine 10 MG tablet    FLEXERIL    30 tablet    Take 0.5-1 tablets (5-10 mg) by mouth 3 times daily as needed for muscle spasms    Acute left-sided low back pain with left-sided sciatica       methylPREDNISolone 4 MG tablet    MEDROL DOSEPAK    21 tablet    Follow package instructions    Acute left-sided low back pain with left-sided  sciatica       order for DME     1 each    Injection Supplies for Vitamin B12: 3cc syringes w/ 27 gauge needles, 1/2 inch length    Bariatric surgery status       order for DME     1 each    Equipment being ordered: crutches    Acute left-sided low back pain with left-sided sciatica       PARoxetine 40 MG tablet    PAXIL    90 tablet    Take 0.5 tablets (20 mg) by mouth At Bedtime    Anxiety       traMADol 50 MG tablet    ULTRAM    20 tablet    Take 1 tablet (50 mg) by mouth every 8 hours as needed for pain    Insufficiency fracture of left femur with routine healing, subsequent encounter       UNJURY Powd     2 Bottle    Take 21 g by mouth daily Unjury 21 gram-100 kcal/27 gram Powder    Bariatric surgery status       * Notice:  This list has 2 medication(s) that are the same as other medications prescribed for you. Read the directions carefully, and ask your doctor or other care provider to review them with you.

## 2018-02-12 NOTE — PATIENT INSTRUCTIONS
Plan:  - Today's Plan of Care:  Work Restrictions  Teach crutch walking  Short term disability form  Fitness-for-duty form  Continue non-weightbearing  Follow-up with PCP to review bone health    Follow Up: 2 weeks in clinic with 2 view left knee xrays

## 2018-02-26 NOTE — PATIENT INSTRUCTIONS
[facility]    If you have any questions regarding to your visit please contact your care team:       Team Red:   Clinic Hours Telephone Number   Dr. Ebonie Mccollum, NP   7am-7pm  Monday - Thursday   7am-5pm  Fridays  [phone]  (Appointment scheduling available 24/7)    Questions about your visit?   Team Line  [phone]   Urgent Care - [place] and [place] - 11am-9pm Monday-Friday Saturday-Sunday- 9am-5pm   [place] - 5pm-9pm Monday-Friday Saturday-Sunday- 9am-5pm  [phone] - [place]   [phone] - [place]       What options do I have for visits at the clinic other than the traditional office visit?  To expand how we care for you, many of our providers are utilizing electronic visits (e-visits) and telephone visits, when medically appropriate, for interactions with their patients rather than a visit in the clinic.   We also offer nurse visits for many medical concerns. Just like any other service, we will bill your insurance company for this type of visit based on time spent on the phone with your provider. Not all insurance companies cover these visits. Please check with your medical insurance if this type of visit is covered. You will be responsible for any charges that are not paid by your insurance.      E-visits via Opternative:  generally incur a $35.00 fee.  Telephone visits:  Time spent on the phone: *charged based on time that is spent on the phone in increments of 10 minutes. Estimated cost:   5-10 mins $30.00   11-20 mins. $59.00   21-30 mins. $85.00     Use Pockethernett (secure email communication and access to your chart) to send your primary care provider a message or make an appointment. Ask someone on your Team how to sign up for Opternative.  For a Price Quote for your services, please call our Consumer Price Line at [phone].      As always, Thank you for trusting us with your health care needs!  Chas GROSS  [place]    Preventive Health Recommendations  Female Ages 40 to 49    Yearly exam:     See your health care provider every year in order to  1. Review health changes.   2. Discuss preventive care.    3. Review your medicines if your doctor prescribed any.      Get a Pap test every three years (unless you have an abnormal result and your provider advises testing more often).      If you get Pap tests with HPV test, you only need to test every 5 years, unless you have an abnormal result. You do not need a Pap test if your uterus was removed (hysterectomy) and you have not had cancer.      You should be tested each year for STDs (sexually transmitted diseases), if you're at risk.       Ask your doctor if you should have a mammogram.      Have a colonoscopy (test for colon cancer) if someone in your family has had colon cancer or polyps before age 50.       Have a cholesterol test every 5 years.       Have a diabetes test (fasting glucose) after age 45. If you are at risk for diabetes, you should have this test every 3 years.    Shots: Get a flu shot each year. Get a tetanus shot every 10 years.     Nutrition:     Eat at least 5 servings of fruits and vegetables each day.    Eat whole-grain bread, whole-wheat pasta and brown rice instead of white grains and rice.    Talk to your provider about Calcium and Vitamin D.     Lifestyle    Exercise at least 150 minutes a week (an average of 30 minutes a day, 5 days a week). This will help you control your weight and prevent disease.    Limit alcohol to one drink per day.    No smoking.     Wear sunscreen to prevent skin cancer.    See your dentist every six months for an exam and cleaning.

## 2018-02-26 NOTE — PROGRESS NOTES
"   SUBJECTIVE:   CC: Yvonne Zarco is an 42 year old woman who presents for preventive health visit.     Physical   Annual:     Getting at least 3 servings of Calcium per day::  Yes    Bi-annual eye exam::  Yes    Dental care twice a year::  Yes    Sleep apnea or symptoms of sleep apnea::  None    Diet::  Other    Frequency of exercise::  None    Taking medications regularly::  Yes    Medication side effects::  Not applicable    Additional concerns today::  YES            {Outside tests to abstract? :238690}    {additional problems to add (Optional):316528}    Today's PHQ-2 Score:   PHQ-2 ( 1999 [employer]) 2/23/2018   Q1: Little interest or pleasure in doing things 0   Q2: Feeling down, depressed or hopeless 0   PHQ-2 Score 0   Q1: Little interest or pleasure in doing things Not at all   Q2: Feeling down, depressed or hopeless Not at all   PHQ-2 Score 0       Abuse: Current or Past(Physical, Sexual or Emotional)- {YES/NO/NA:581912}  Do you feel safe in your environment - {YES/NO/NA:537961}    Social History   Substance Use Topics     Smoking status: Current Every Day Smoker     Packs/day: 0.50     Years: 22.00     Types: Cigarettes     Smokeless tobacco: Never Used      Comment: Tried many times     Alcohol use No     Alcohol Use 2/23/2018   If you drink alcohol, do you typically have greater than 3 drinks per day OR greater than 7 drinks per week?   No   {add AUDIT responses (Optional) (A score of 7 for adult men is an indication of hazardous drinking; a score of 8 or more is an indication of an alcohol use disorder.  A score of 7 or more for adult women is an indication of hazardous drinking or an alchohol use disorder):066965}    Reviewed orders with patient.  Reviewed health maintenance and updated orders accordingly - {Yes/No:385229::\"Yes\"}  {Chronicprobdata (Optional):249330}    {Mammo Decision Support (Optional):212470}    Pertinent mammograms are reviewed under the imaging tab.  History of abnormal Pap smear: " "{PAP HX:671667}    Reviewed and updated as needed this visit by clinical staff         Reviewed and updated as needed this visit by Provider        {HISTORY OPTIONS (Optional):685340}    Review of Systems  {FEMALE PREVENTATIVE ROS:911846}     OBJECTIVE:   There were no vitals taken for this visit.  Physical Exam  {Exam Choices:980449}    ASSESSMENT/PLAN:   {Diag Picklist:520602}    COUNSELING:  {FEMALE COUNSELING MESSAGES:019556::\"Reviewed preventive health counseling, as reflected in patient instructions\"}    {BP Counseling- Complete if BP >= 120/80  (Optional):829910}     reports that she has been smoking Cigarettes.  She has a 11.00 pack-year smoking history. She has never used smokeless tobacco.  {Tobacco Cessation -- Complete if patient is a smoker (Optional):612487}  Estimated body mass index is 28.34 kg/(m^2) as calculated from the following:    Height as of 2/12/18: 5' 1\" (1.549 m).    Weight as of 2/12/18: 150 lb (68 kg).   {Weight Management Plan (ACO) Complete if BMI is abnormal-  Ages 18-64  BMI >24.9.  Age 65+ with BMI <23 or >30 (Optional):389200}    Counseling Resources:  ATP IV Guidelines  Pooled Cohorts Equation Calculator  Breast Cancer Risk Calculator  FRAX Risk Assessment  ICSI Preventive Guidelines  Dietary Guidelines for Americans, 2010  USDA's MyPlate  ASA Prophylaxis  Lung CA Screening    [facility]  "

## 2018-02-27 ENCOUNTER — OFFICE VISIT (OUTPATIENT)
Dept: OBGYN | Facility: CLINIC | Age: 43
End: 2018-02-27
Payer: COMMERCIAL

## 2018-02-27 VITALS
DIASTOLIC BLOOD PRESSURE: 73 MMHG | WEIGHT: 155.3 LBS | OXYGEN SATURATION: 98 % | TEMPERATURE: 98.2 F | SYSTOLIC BLOOD PRESSURE: 113 MMHG | HEART RATE: 101 BPM | BODY MASS INDEX: 29.34 KG/M2

## 2018-02-27 DIAGNOSIS — N83.292 COMPLEX CYST OF LEFT OVARY: Primary | ICD-10-CM

## 2018-02-27 PROCEDURE — 99214 OFFICE O/P EST MOD 30 MIN: CPT | Performed by: OBSTETRICS & GYNECOLOGY

## 2018-02-27 RX ORDER — MOMETASONE FUROATE MONOHYDRATE 50 UG/1
2 SPRAY, METERED NASAL DAILY
COMMUNITY

## 2018-02-27 NOTE — PROGRESS NOTES
Yovnne is a 42 year old  referred here by self  for consultation regarding second opinion..  During a work up for hip pain, an MRI found an incidental Left ovarian cyst. Follow up ultrasound bellow showed a complex left ovarian cyst, consistent with either an endometrioma or a hemorragic cyst. She has a history of KOLTON for heavy menses many years ago. She denies any abdominal, pelvic or back pains. She saw her gynecologist, Dr. Fowler, who has recommended a repeat ultrasound next week to check for resolution. If the size of there cyst is the same or larger, then surery will be considered.    ULTRASOUND PELVIC COMPLETE WITH TRANSVAGINAL 2018 4:48 PM      HISTORY: Pelvic mass.      COMPARISON: Lumbar spine MRI 2018.     FINDINGS: Transvaginal images were performed to better evaluate the  patient's uterus, ovaries and endometrial stripe.     The uterus is surgically absent. The right ovary measures 2.6 x 2.1 x  2.1 cm and contains a small complex appearing cystic lesion measuring  1.8 cm. The left ovary measures 5.8 x 4.2 x 6.3 cm. There is a large  complex cystic lesion without color Doppler flow involving the left  ovary nearly the size of the entire left ovary. Color Doppler flow is  noted within the right ovary. Left ovarian color flow is difficult to  confirm due to the large complex cystic lesion. Only a thin rim of  ovarian tissue is visualized on some of the ultrasound images. No  adnexal masses are present. No free pelvic fluid is present.         IMPRESSION: Large complex cystic lesion left ovary without associated  color Doppler flow most likely an endometrioma or large hemorrhagic  cyst as seen on recent lumbar spine MRI. Complex appearing cystic  lesion right ovary is also noted. Prior hysterectomy. Follow-up  ultrasound in two or three months recommended to assess for interval  resolution.  ROS: Ten point review of systems was reviewed and negative except the above.    Gyne: - abn pap  (last pap ), - STD's    Past Medical History:   Diagnosis Date     Depressive disorder forever     Endometriosis     found at time of abdominal hysterectomy     Gastro-oesophageal reflux disease      Hiatal hernia      Hypertension      Past Surgical History:   Procedure Laterality Date      SECTION      x2     CHOLECYSTECTOMY       ESOPHAGOSCOPY, GASTROSCOPY, DUODENOSCOPY (EGD), COMBINED  2012    Procedure: COMBINED ESOPHAGOSCOPY, GASTROSCOPY, DUODENOSCOPY (EGD), BIOPSY SINGLE OR MULTIPLE;;  Surgeon: Scott Seth MD;  Location:  GI     GENITOURINARY SURGERY  ??    bladder sling with hysterectomy early      GYN SURGERY      tubal ligation     hysterectomy with bladder sling      left ovaries     HYSTERECTOMY, PAP NO LONGER INDICATED      Abdominal hyst     LAPAROSCOPIC GASTRIC SLEEVE  5/10/2013    Procedure: LAPAROSCOPIC GASTRIC SLEEVE;  Laparoscopic Sleeve Gastrectomy with hiatal hernia repair;  Surgeon: Scott Seth MD;  Location:  OR     [facility] BILATERAL       ORTHOPEDIC SURGERY      right foot surgery     Patient Active Problem List   Diagnosis     GERD (gastroesophageal reflux disease)     Bariatric surgery status     Anxiety     Tobacco use disorder     Lumbago     Complex cyst of left ovary       ALL/Meds: Her medication and allergy histories were reviewed and are documented in their appropriate chart areas.    SH: - tob, - EtOH,     FH: Her family history was reviewed and documented in its appropriate chart area.    PE: /73  Pulse 101  Temp 98.2  F (36.8  C) (Oral)  Wt 155 lb 4.8 oz (70.4 kg)  SpO2 98%  BMI 29.34 kg/m2  Body mass index is 29.34 kg/(m^2).    General Appearance:  healthy, alert, active, no distress  HEENT: NCAT  Abdomen: Soft, nontender.  Normal bowel sounds.  No masses  Pelvic:deferred  A/P      ICD-10-CM    1. Complex cyst of left ovary N83.292      Discussed ovarian cysts.  Explained the difference between functional cysts,  benign and malignant cystic tumors.  Discussed the increased concern associated with increasing complexity.  Discussed the risk of rupture and torsion associated with the different types and sizes of cysts.  Explained that the only way to confirm the type of cyst is with surgical removal.   I  discussed conservative management  with repeat imaging.Discussed chronic nature of endometriosis.     Discussed the potential for surgical intervention although symptom reduction is often temporary after fulguration.  Discussed  with BSO as definitive surgery, but that leads to surgical menopause and that a portion of women may continue to experience pelvic pain post-surgically.   ACOG handout given to patient.    In my opinion, the plan suggested by her gynecologist is reasonable.    25 minutes was spent face to face with the patient today discussing her history, diagnosis, and follow-up plan as noted above.  Over 50% of the visit was spent in counseling and coordination of care.    Total Visit Time: 30 minutes.    CEPHAS AGBEH, MD.

## 2018-02-27 NOTE — MR AVS SNAPSHOT
After Visit Summary   2/27/2018    Yvonne Zarco    MRN: [id number]           Patient Information     Date Of Birth          1975        Visit Information        Provider Department      2/27/2018 8:45 AM Agbeh, Cephas Mawuena, MD [facility]        Today's Diagnoses     Complex cyst of left ovary    -  1      Care Instructions              If you have any questions regarding your visit, Please contact your care team.    Women s Health CLINIC HOURS TELEPHONE NUMBER   Cephas Agbeh, M.D.    Pamella  ILENE Dorsey -         Monday-[facility]  8:00 am - 5 pm  Tuesday- [facility]  8:00am- 5 pm  Wednesday- Off  Thursday- [facility]  8:00 am- 5 pm  Friday-[place]  8:00 am 5 pm [facility]  [address] [place] [address]  [phone] ask for Women[facility] Clinic    Imaging [phone]    [facility]  [address] [facility]  [place] [address]  [phone]  Imaging [phone]     Urgent Care locations:    [facility] Saturday and Sunday   9 am - 5 pm    Monday-Friday   12 pm - 8 pm  Saturday and Sunday   9 am - 5 pm   [phone]       If you need a medication refill, please contact your pharmacy. Please allow 3 business days for your refill to be completed.  As always, Thank you for trusting us with your healthcare needs!                Follow-ups after your visit        Your next 10 appointments already scheduled     Feb 28, 2018  5:40 PM CST   Leigha Physical Adult with ISH Zurita CNP   [facility] ([facility])    [address]  [place] [address] [phone]            Mar 05, 2018  6:00 PM CST   Leigha Sports Medicine New with Maritza Morin MD   [facility] [place] ([facility] [place])    [address]  [place] [address] [phone]             Mar 08, 2018  2:20 PM CST   US PELVIC COMPLETE W TRANSVAGINAL with RDUS1   [facility] ([facility])    [address] [place] [address]  [address] [place] [address] [phone]           Please bring a list of your medicines (including vitamins, minerals and over-the-counter drugs). Also, tell your doctor about any allergies you may have. Wear comfortable clothes and leave your valuables at home.  Adults: Drink six 8-ounce glasses of fluid one hour before your exam. Do NOT empty your bladder.  If you need to empty your bladder before your exam, try to release only a little bit of urine. Then, drink another 8oz glass of fluid.  Children: Children who are potty trained should drink at least 4 cups (32 oz) of liquid 45 minutes to one hour prior to the exam. The child s bladder must be full in order to achieve a diagnostic exam. If your child is very uncomfortable or has an urgent need to pee, please notify a technologist; they will try to find out how much longer the wait may be and provide instructions to help relieve the pressure. Occasionally it is medically necessary to insert a urinary catheter to fill the bladder.  Please call the Imaging Department at your exam site with any questions.            Mar 08, 2018  3:00 PM CST   SHORT with Maritza Fowler MD   [facility] ([facility])    [address] [place] [address] [phone]              Who to contact     If you have questions or need follow up information about today's clinic visit or your schedule please contact [facility] directly at [phone].  Normal or non-critical lab and imaging results will be communicated to you by MyChart, letter or phone within 4 business days after the clinic has received the results. If you do not hear from us within 7 days, please contact the clinic through MyChart or phone. If you have a critical or  abnormal lab result, we will notify you by phone as soon as possible.  Submit refill requests through [employer] or call your pharmacy and they will forward the refill request to us. Please allow 3 business days for your refill to be completed.          Additional Information About Your Visit        EpiEPhart Information     [employer] gives you secure access to your electronic health record. If you see a primary care provider, you can also send messages to your care team and make appointments. If you have questions, please call your primary care clinic.  If you do not have a primary care provider, please call [phone] and they will assist you.        Care EveryWhere ID     This is your Care EveryWhere ID. This could be used by other organizations to access your [place] medical records  [id number]        Your Vitals Were     Pulse Temperature Pulse Oximetry BMI (Body Mass Index)          101 98.2  F (36.8  C) (Oral) 98% 29.34 kg/m2         Blood Pressure from Last 3 Encounters:   02/27/18 113/73   02/12/18 118/62   01/30/18 125/80    Weight from Last 3 Encounters:   02/27/18 155 lb 4.8 oz (70.4 kg)   02/12/18 150 lb (68 kg)   01/30/18 150 lb (68 kg)              Today, you had the following     No orders found for display         Today's Medication Changes          These changes are accurate as of 2/27/18  1:21 PM.  If you have any questions, ask your nurse or doctor.               Stop taking these medicines if you haven't already. Please contact your care team if you have questions.     methylPREDNISolone 4 MG tablet   Commonly known as:  MEDROL DOSEPAK   Stopped by:  Agbeh, Cephas Mawuena, MD                    Primary Care Provider Office Phone # Fax #    ISH Zurita [facility] [phone] [address] [place] [address]        Equal Access to Services     JAGDISH QUEZADA : David Ferrer, wajaquida jarrod, qaybta luis m matt, surjit maldonado  paula lópez ah. So [facility] [phone].    ATENCIÓN: Si sheela spear, tiene a javier disposición servicios gratuitos de asistencia lingüística. Masha carpenter [phone].    We comply with applicable federal civil rights laws and [place] laws. We do not discriminate on the basis of race, color, national origin, age, disability, sex, sexual orientation, or gender identity.            Thank you!     Thank you for choosing [facility]  for your care. Our goal is always to provide you with excellent care. Hearing back from our patients is one way we can continue to improve our services. Please take a few minutes to complete the written survey that you may receive in the mail after your visit with us. Thank you!             Your Updated Medication List - Protect others around you: Learn how to safely use, store and throw away your medicines at www.disposemymeds.org.          This list is accurate as of 2/27/18  1:21 PM.  Always use your most recent med list.                   Brand Name Dispense Instructions for use Diagnosis    benzoyl peroxide 5 % Liqd     120 mL    Apply to entire face and then wash off after 5 min daily. Then apply topical clindamycin. May bleach towels.    Acne       buPROPion 150 MG 12 hr tablet    WELLBUTRIN SR    60 tablet    Take 1 tablet once daily and increase to 1 tablet twice daily after 4 to 7 days    Tobacco use disorder       calcium Citrate-vitamin D 500-400 MG-UNIT Chew     60 tablet    Take 500 mg by mouth 2 times daily    Unspecified intestinal malabsorption, Status post bariatric surgery       * VITAMIN B 12 PO      Take 50 mcg by mouth daily        * cyanocobalamin 1000 MCG/ML injection    VITAMIN B12    3 mL    Inject 1 mL (1,000 mcg) into the muscle every 30 days    Bariatric surgery status       DAILY MULTIVITAMIN PO      Take  by mouth. Take 2 chewable tablets daily    Bariatric surgery status       diclofenac 1 % Gel topical gel    VOLTAREN    100 g    Apply 4 grams  to knees or 2 grams to hands four times daily using enclosed dosing card.    Acute left-sided low back pain with left-sided sciatica       esomeprazole 20 MG CR capsule    nexIUM    84 capsule    Take 1 capsule (20 mg) by mouth 2 times daily Take 30-60 minutes before eating.    Gastroesophageal reflux disease, esophagitis presence not specified       * FLEXERIL PO           * cyclobenzaprine 10 MG tablet    FLEXERIL    30 tablet    Take 0.5-1 tablets (5-10 mg) by mouth 3 times daily as needed for muscle spasms    Acute left-sided low back pain with left-sided sciatica       mometasone 50 MCG/ACT spray    NASONEX     Spray 2 sprays into both nostrils daily        order for DME     1 each    Injection Supplies for Vitamin B12: 3cc syringes w/ 27 gauge needles, 1/2 inch length    Bariatric surgery status       order for DME     1 each    Equipment being ordered: crutches    Acute left-sided low back pain with left-sided sciatica       PARoxetine 40 MG tablet    PAXIL    90 tablet    Take 0.5 tablets (20 mg) by mouth At Bedtime    Anxiety       traMADol 50 MG tablet    ULTRAM    20 tablet    Take 1 tablet (50 mg) by mouth every 8 hours as needed for pain    Insufficiency fracture of left femur with routine healing, subsequent encounter       UNJURY Powd     2 Bottle    Take 21 g by mouth daily Unjury 21 gram-100 kcal/27 gram Powder    Bariatric surgery status       * Notice:  This list has 4 medication(s) that are the same as other medications prescribed for you. Read the directions carefully, and ask your doctor or other care provider to review them with you.

## 2018-02-27 NOTE — NURSING NOTE
"Chief Complaint   Patient presents with     Consult     Ovarian Cyst       Initial /73  Pulse 101  Temp 98.2  F (36.8  C) (Oral)  Wt 155 lb 4.8 oz (70.4 kg)  SpO2 98%  BMI 29.34 kg/m2 Estimated body mass index is 29.34 kg/(m^2) as calculated from the following:    Height as of 2/12/18: 5' 1\" (1.549 m).    Weight as of this encounter: 155 lb 4.8 oz (70.4 kg).  Medication Reconciliation: complete   Bridget Espinoza CMA      "

## 2018-02-27 NOTE — PATIENT INSTRUCTIONS
If you have any questions regarding your visit, Please contact your care team.    Women s Health CLINIC HOURS TELEPHONE NUMBER   Cephas Agbeh, M.D.    Pamella [place]sey -         Monday-[facility]  8:00 am - 5 pm  Tuesday- [facility]  8:00am- 5 pm  Wednesday- Off  Thursday- [facility]  8:00 am- 5 pm  Friday-[place]  8:00 am 5 pm [facility]  [address] [place] [address]  [phone] ask for [facility]    Imaging [phone]    [facility]  [address]  [place] [address] [phone]  Imaging [phone]     Urgent Care locations:    [facility] Saturday and Sunday   9 am - 5 pm    Monday-Friday   12 pm - 8 pm  Saturday and Sunday   9 am - 5 pm   [phone]       If you need a medication refill, please contact your pharmacy. Please allow 3 business days for your refill to be completed.  As always, Thank you for trusting us with your healthcare needs!

## 2018-02-28 ENCOUNTER — OFFICE VISIT (OUTPATIENT)
Dept: FAMILY MEDICINE | Facility: CLINIC | Age: 43
End: 2018-02-28
Payer: COMMERCIAL

## 2018-02-28 VITALS
TEMPERATURE: 98.1 F | DIASTOLIC BLOOD PRESSURE: 74 MMHG | WEIGHT: 155 LBS | BODY MASS INDEX: 29.27 KG/M2 | HEIGHT: 61 IN | RESPIRATION RATE: 12 BRPM | SYSTOLIC BLOOD PRESSURE: 130 MMHG | OXYGEN SATURATION: 95 % | HEART RATE: 85 BPM

## 2018-02-28 DIAGNOSIS — M84.452D: ICD-10-CM

## 2018-02-28 DIAGNOSIS — Z98.84 BARIATRIC SURGERY STATUS: Primary | ICD-10-CM

## 2018-02-28 LAB — HGB BLD-MCNC: 13.1 G/DL (ref 11.7–15.7)

## 2018-02-28 PROCEDURE — 83970 ASSAY OF PARATHORMONE: CPT | Performed by: NURSE PRACTITIONER

## 2018-02-28 PROCEDURE — 84100 ASSAY OF PHOSPHORUS: CPT | Performed by: NURSE PRACTITIONER

## 2018-02-28 PROCEDURE — 82728 ASSAY OF FERRITIN: CPT | Performed by: NURSE PRACTITIONER

## 2018-02-28 PROCEDURE — 85018 HEMOGLOBIN: CPT | Performed by: NURSE PRACTITIONER

## 2018-02-28 PROCEDURE — 82607 VITAMIN B-12: CPT | Performed by: NURSE PRACTITIONER

## 2018-02-28 PROCEDURE — 36415 COLL VENOUS BLD VENIPUNCTURE: CPT | Performed by: NURSE PRACTITIONER

## 2018-02-28 PROCEDURE — 82306 VITAMIN D 25 HYDROXY: CPT | Performed by: NURSE PRACTITIONER

## 2018-02-28 PROCEDURE — 99213 OFFICE O/P EST LOW 20 MIN: CPT | Performed by: NURSE PRACTITIONER

## 2018-02-28 PROCEDURE — 80053 COMPREHEN METABOLIC PANEL: CPT | Performed by: NURSE PRACTITIONER

## 2018-02-28 NOTE — NURSING NOTE
"Chief Complaint   Patient presents with     Lab Only     Dexa scan request by her bone doctor.     Initial /74 (BP Location: Left arm, Patient Position: Chair, Cuff Size: Adult Regular)  Pulse 85  Temp 98.1  F (36.7  C) (Oral)  Resp 12  Ht 5' 1\" (1.549 m)  Wt 155 lb (70.3 kg)  SpO2 95%  Breastfeeding? No  BMI 29.29 kg/m2 Estimated body mass index is 29.29 kg/(m^2) as calculated from the following:    Height as of this encounter: 5' 1\" (1.549 m).    Weight as of this encounter: 155 lb (70.3 kg).  Medication Reconciliation: complete     Chas Valdes      "

## 2018-02-28 NOTE — MR AVS SNAPSHOT
After Visit Summary   2/28/2018    Yvonne Zarco    MRN: [id number]           Patient Information     Date Of Birth          1975        Visit Information        Provider Department      2/28/2018 5:40 PM Rima Mccollum APRN CNP [facility]        Today's Diagnoses     Bariatric surgery status    -  1      Care Instructions    [facility]    If you have any questions regarding to your visit please contact your care team:       Team Red:   Clinic Hours Telephone Number   Dr. Ebonie Mccollum, NP   7am-7pm  Monday - Thursday   7am-5pm  Fridays  [phone]  (Appointment scheduling available 24/7)    Questions about your visit?   Team Line  [phone]   Urgent Care - [place] and [facility] - 11am-9pm Monday-Friday Saturday-Sunday- 9am-5pm   [place] - 5pm-9pm Monday-Friday Saturday-Sunday- 9am-5pm  [phone] - [facility]  [phone] - [place]       What options do I have for visits at the clinic other than the traditional office visit?  To expand how we care for you, many of our providers are utilizing electronic visits (e-visits) and telephone visits, when medically appropriate, for interactions with their patients rather than a visit in the clinic.   We also offer nurse visits for many medical concerns. Just like any other service, we will bill your insurance company for this type of visit based on time spent on the phone with your provider. Not all insurance companies cover these visits. Please check with your medical insurance if this type of visit is covered. You will be responsible for any charges that are not paid by your insurance.      E-visits via [employer]:  generally incur a $35.00 fee.  Telephone visits:  Time spent on the phone: *charged based on time that is spent on the phone in increments of 10 minutes. Estimated cost:   5-10 mins $30.00   11-20 mins. $59.00   21-30 mins.  $85.00     Use [employer] (secure email communication and access to your chart) to send your primary care provider a message or make an appointment. Ask someone on your Team how to sign up for [employer].  For a Price Quote for your services, please call our [employer] Price Line at [phone].      As always, Thank you for trusting us with your health care needs!  Chas Valdes    Preventive Health Recommendations  Female Ages 40 to 49    Yearly exam:     See your health care provider every year in order to  1. Review health changes.   2. Discuss preventive care.    3. Review your medicines if your doctor prescribed any.      Get a Pap test every three years (unless you have an abnormal result and your provider advises testing more often).      If you get Pap tests with HPV test, you only need to test every 5 years, unless you have an abnormal result. You do not need a Pap test if your uterus was removed (hysterectomy) and you have not had cancer.      You should be tested each year for STDs (sexually transmitted diseases), if you're at risk.       Ask your doctor if you should have a mammogram.      Have a colonoscopy (test for colon cancer) if someone in your family has had colon cancer or polyps before age 50.       Have a cholesterol test every 5 years.       Have a diabetes test (fasting glucose) after age 45. If you are at risk for diabetes, you should have this test every 3 years.    Shots: Get a flu shot each year. Get a tetanus shot every 10 years.     Nutrition:     Eat at least 5 servings of fruits and vegetables each day.    Eat whole-grain bread, whole-wheat pasta and brown rice instead of white grains and rice.    Talk to your provider about Calcium and Vitamin D.     Lifestyle    Exercise at least 150 minutes a week (an average of 30 minutes a day, 5 days a week). This will help you control your weight and prevent disease.    Limit alcohol to one drink per day.    No smoking.     Wear sunscreen to  prevent skin cancer.    See your dentist every six months for an exam and cleaning.          Follow-ups after your visit        Your next 10 appointments already scheduled     Mar 05, 2018  6:00 PM CST   MyChart Sports Medicine New with Maritza Morin MD   [facility] ([facility] [place])    [address]  [place] [address]   [phone]            Mar 08, 2018  2:20 PM CST   US PELVIC COMPLETE W TRANSVAGINAL with RDUS1   [facility] ([facility])    [address] [place] [address]  [address]  [facility] [address] [phone]           Please bring a list of your medicines (including vitamins, minerals and over-the-counter drugs). Also, tell your doctor about any allergies you may have. Wear comfortable clothes and leave your valuables at home.  Adults: Drink six 8-ounce glasses of fluid one hour before your exam. Do NOT empty your bladder.  If you need to empty your bladder before your exam, try to release only a little bit of urine. Then, drink another 8oz glass of fluid.  Children: Children who are potty trained should drink at least 4 cups (32 oz) of liquid 45 minutes to one hour prior to the exam. The child s bladder must be full in order to achieve a diagnostic exam. If your child is very uncomfortable or has an urgent need to pee, please notify a technologist; they will try to find out how much longer the wait may be and provide instructions to help relieve the pressure. Occasionally it is medically necessary to insert a urinary catheter to fill the bladder.  Please call the Imaging Department at your exam site with any questions.            Mar 08, 2018  3:00 PM CST   SHORT with Maritza Fowler MD   [facility] ([facility])    [address] [place] [address]  [address]  [facility] [address] [phone]              Future tests that were ordered for you today     Open Future Orders   "      Priority Expected Expires Ordered    DX Hip/Pelvis/Spine Routine 3/30/2018 2/28/2019 2/28/2018            Who to contact     If you have questions or need follow up information about today's clinic visit or your schedule please contact [facility] directly at [phone].  Normal or non-critical lab and imaging results will be communicated to you by MyChart, letter or phone within 4 business days after the clinic has received the results. If you do not hear from us within 7 days, please contact the clinic through [employer] or phone. If you have a critical or abnormal lab result, we will notify you by phone as soon as possible.  Submit refill requests through [employer] or call your pharmacy and they will forward the refill request to us. Please allow 3 business days for your refill to be completed.          Additional Information About Your Visit        MyChart Information     [employer] gives you secure access to your electronic health record. If you see a primary care provider, you can also send messages to your care team and make appointments. If you have questions, please call your primary care clinic.  If you do not have a primary care provider, please call [phone] and they will assist you.        Care EveryWhere ID     This is your Care EveryWhere ID. This could be used by other organizations to access your [place] medical records  [id number]        Your Vitals Were     Pulse Temperature Respirations Height Pulse Oximetry Breastfeeding?    85 98.1  F (36.7  C) (Oral) 12 5' 1\" (1.549 m) 95% No    BMI (Body Mass Index)                   29.29 kg/m2            Blood Pressure from Last 3 Encounters:   02/28/18 130/74   02/27/18 113/73   02/12/18 118/62    Weight from Last 3 Encounters:   02/28/18 155 lb (70.3 kg)   02/27/18 155 lb 4.8 oz (70.4 kg)   02/12/18 150 lb (68 kg)              We Performed the Following     Comprehensive metabolic panel     Ferritin     Hemoglobin     Parathyroid " Hormone Intact     Phosphorus     Vitamin B12     Vitamin D Deficiency        Primary Care Provider Office Phone # Fax #    Rima Mccollum, APRN [facility] [phone]       [address] [facility] [address]        Equal Access to Services     JAGDISH QUEZADA : Hadii patrick ku hadguillermoo Soomaali, waaxda luqadaha, qaybta kaalmada adeegyada, waxenedelia idiin haypinan adetank mitchell maribel telles. So [facility] [phone].    ATENCIÓN: Si habla español, tiene a javier disposición servicios gratuitos de asistencia lingüística. Llame al [phone].    We comply with applicable federal civil rights laws and [place] laws. We do not discriminate on the basis of race, color, national origin, age, disability, sex, sexual orientation, or gender identity.            Thank you!     Thank you for choosing [facility]  for your care. Our goal is always to provide you with excellent care. Hearing back from our patients is one way we can continue to improve our services. Please take a few minutes to complete the written survey that you may receive in the mail after your visit with us. Thank you!             Your Updated Medication List - Protect others around you: Learn how to safely use, store and throw away your medicines at www.disposemymeds.org.          This list is accurate as of 2/28/18  6:20 PM.  Always use your most recent med list.                   Brand Name Dispense Instructions for use Diagnosis    benzoyl peroxide 5 % Liqd     120 mL    Apply to entire face and then wash off after 5 min daily. Then apply topical clindamycin. May bleach towels.    Acne       buPROPion 150 MG 12 hr tablet    WELLBUTRIN SR    60 tablet    Take 1 tablet once daily and increase to 1 tablet twice daily after 4 to 7 days    Tobacco use disorder       calcium Citrate-vitamin D 500-400 MG-UNIT Chew     60 tablet    Take 500 mg by mouth 2 times daily    Unspecified intestinal malabsorption, Status post bariatric surgery       * VITAMIN  B 12 PO      Take 500 mcg by mouth daily        * cyanocobalamin 1000 MCG/ML injection    VITAMIN B12    3 mL    Inject 1 mL (1,000 mcg) into the muscle every 30 days    Bariatric surgery status       DAILY MULTIVITAMIN PO      Take  by mouth. Take 2 chewable tablets daily    Bariatric surgery status       diclofenac 1 % Gel topical gel    VOLTAREN    100 g    Apply 4 grams to knees or 2 grams to hands four times daily using enclosed dosing card.    Acute left-sided low back pain with left-sided sciatica       esomeprazole 20 MG CR capsule    nexIUM    84 capsule    Take 1 capsule (20 mg) by mouth 2 times daily Take 30-60 minutes before eating.    Gastroesophageal reflux disease, esophagitis presence not specified       * FLEXERIL PO           * cyclobenzaprine 10 MG tablet    FLEXERIL    30 tablet    Take 0.5-1 tablets (5-10 mg) by mouth 3 times daily as needed for muscle spasms    Acute left-sided low back pain with left-sided sciatica       mometasone 50 MCG/ACT spray    NASONEX     Spray 2 sprays into both nostrils daily        order for DME     1 each    Injection Supplies for Vitamin B12: 3cc syringes w/ 27 gauge needles, 1/2 inch length    Bariatric surgery status       order for DME     1 each    Equipment being ordered: crutches    Acute left-sided low back pain with left-sided sciatica       PARoxetine 40 MG tablet    PAXIL    90 tablet    Take 0.5 tablets (20 mg) by mouth At Bedtime    Anxiety       traMADol 50 MG tablet    ULTRAM    20 tablet    Take 1 tablet (50 mg) by mouth every 8 hours as needed for pain    Insufficiency fracture of left femur with routine healing, subsequent encounter       UNJURY Powd     2 Bottle    Take 21 g by mouth daily Unjury 21 gram-100 kcal/27 gram Powder    Bariatric surgery status       * Notice:  This list has 4 medication(s) that are the same as other medications prescribed for you. Read the directions carefully, and ask your doctor or other care provider to review them  with you.

## 2018-02-28 NOTE — PROGRESS NOTES
SUBJECTIVE:   Yvonne Zarco is a 42 year old female who presents to clinic today for the following health issues:    Chief Complaint   Patient presents with     Lab Only     Dexa scan request by her bone doctor.     Patient is s/p bariatric surgery with current insufficiency fracture of left femur. Her sports medicine doctor suggested a DEXA scan to r/o osteoporosis.    Problem list and histories reviewed & adjusted, as indicated.  Additional history: as documented    Patient Active Problem List   Diagnosis     GERD (gastroesophageal reflux disease)     Bariatric surgery status     Anxiety     Tobacco use disorder     Lumbago     Complex cyst of left ovary     Past Surgical History:   Procedure Laterality Date      SECTION      x2     CHOLECYSTECTOMY       ESOPHAGOSCOPY, GASTROSCOPY, DUODENOSCOPY (EGD), COMBINED  2012    Procedure: COMBINED ESOPHAGOSCOPY, GASTROSCOPY, DUODENOSCOPY (EGD), BIOPSY SINGLE OR MULTIPLE;;  Surgeon: Scott Seth MD;  Location:  GI     GENITOURINARY SURGERY  ??    bladder sling with hysterectomy early      GYN SURGERY      tubal ligation     hysterectomy with bladder sling      left ovaries     HYSTERECTOMY, PAP NO LONGER INDICATED      Abdominal hyst     LAPAROSCOPIC GASTRIC SLEEVE  5/10/2013    Procedure: LAPAROSCOPIC GASTRIC SLEEVE;  Laparoscopic Sleeve Gastrectomy with hiatal hernia repair;  Surgeon: Scott Seth MD;  Location: [facility] BILATERAL       ORTHOPEDIC SURGERY      right foot surgery       Social History   Substance Use Topics     Smoking status: Current Some Day Smoker     Packs/day: 0.50     Years: 20.00     Types: Cigarettes     Last attempt to quit: 2017     Smokeless tobacco: Never Used      Comment: Tried many times     Alcohol use No     Family History   Problem Relation Age of Onset     Hypertension Father           Substance Abuse Father         alcoholism     DIABETES Mother      diet  "controlled     Hypertension Mother      meds     Coronary Artery Disease Mother 67     quadruple bypass     Hyperlipidemia Mother      Substance Abuse Mother      alcoholism     Obesity Mother      Hypertension Sister      Anxiety Disorder Sister      Anxiety Disorder Daughter      Anxiety Disorder Niece      Hypertension Sister      meds     Anxiety Disorder Sister      no treated     Hyperlipidemia Sister      Brain Tumor Other      Heart Failure Other      Anxiety Disorder Daughter      on meds     Anesthesia Reaction Daughter      Asthma Daughter      Anxiety Disorder Niece      on meds     Substance Abuse Son      drug addiction     CANCER No family hx of      CEREBROVASCULAR DISEASE No family hx of      Thyroid Disease No family hx of      Glaucoma No family hx of      Macular Degeneration No family hx of            Reviewed and updated as needed this visit by clinical staff  Tobacco  Allergies  Meds  Med Hx  Surg Hx  Fam Hx  Soc Hx      Reviewed and updated as needed this visit by Provider       ROS:  Constitutional, MS, neuro systems are negative, except as otherwise noted.    OBJECTIVE:     /74 (BP Location: Left arm, Patient Position: Chair, Cuff Size: Adult Regular)  Pulse 85  Temp 98.1  F (36.7  C) (Oral)  Resp 12  Ht 5' 1\" (1.549 m)  Wt 155 lb (70.3 kg)  SpO2 95%  Breastfeeding? No  BMI 29.29 kg/m2  Body mass index is 29.29 kg/(m^2).  GENERAL: healthy, alert and no distress  NEURO: Normal strength and tone, mentation intact and speech normal  PSYCH: mentation appears normal, affect normal/bright    Diagnostic Test Results:  Results for orders placed or performed in visit on 02/28/18 (from the past 24 hour(s))   Hemoglobin   Result Value Ref Range    Hemoglobin 13.1 11.7 - 15.7 g/dL       ASSESSMENT/PLAN:       1. Bariatric surgery status  Proceed with DEXA and update labs today. Discussed options for treatment if she does have osteoporosis including Fosamax, Prolio, Reclast and " patient would like Fosamax if indicated. Will start this over the phone if needed.  - DX Hip/Pelvis/Spine; Future  - Ferritin  - Vitamin B12  - Comprehensive metabolic panel  - Hemoglobin  - Parathyroid Hormone Intact  - Vitamin D Deficiency  - Phosphorus    2. Insufficiency fracture of femur, left, with routine healing, subsequent encounter  As above      Follow up pending lab/DEXA results    ISH Zurita CNP  [facility]

## 2018-03-01 LAB
ALBUMIN SERPL-MCNC: 4.1 G/DL (ref 3.4–5)
ALP SERPL-CCNC: 52 U/L (ref 40–150)
ALT SERPL W P-5'-P-CCNC: 21 U/L (ref 0–50)
ANION GAP SERPL CALCULATED.3IONS-SCNC: 7 MMOL/L (ref 3–14)
AST SERPL W P-5'-P-CCNC: 13 U/L (ref 0–45)
BILIRUB SERPL-MCNC: 0.3 MG/DL (ref 0.2–1.3)
BUN SERPL-MCNC: 14 MG/DL (ref 7–30)
CALCIUM SERPL-MCNC: 8.7 MG/DL (ref 8.5–10.1)
CHLORIDE SERPL-SCNC: 107 MMOL/L (ref 94–109)
CO2 SERPL-SCNC: 27 MMOL/L (ref 20–32)
CREAT SERPL-MCNC: 0.59 MG/DL (ref 0.52–1.04)
DEPRECATED CALCIDIOL+CALCIFEROL SERPL-MC: 32 UG/L (ref 20–75)
FERRITIN SERPL-MCNC: 25 NG/ML (ref 12–150)
GFR SERPL CREATININE-BSD FRML MDRD: >90 ML/MIN/1.7M2
GLUCOSE SERPL-MCNC: 94 MG/DL (ref 70–99)
PHOSPHATE SERPL-MCNC: 3.8 MG/DL (ref 2.5–4.5)
POTASSIUM SERPL-SCNC: 4.1 MMOL/L (ref 3.4–5.3)
PROT SERPL-MCNC: 7.1 G/DL (ref 6.8–8.8)
PTH-INTACT SERPL-MCNC: 66 PG/ML (ref 18–80)
SODIUM SERPL-SCNC: 141 MMOL/L (ref 133–144)
VIT B12 SERPL-MCNC: 1305 PG/ML (ref 193–986)

## 2018-03-05 ENCOUNTER — RADIANT APPOINTMENT (OUTPATIENT)
Dept: GENERAL RADIOLOGY | Facility: CLINIC | Age: 43
End: 2018-03-05
Attending: PEDIATRICS
Payer: COMMERCIAL

## 2018-03-05 ENCOUNTER — OFFICE VISIT (OUTPATIENT)
Dept: ORTHOPEDICS | Facility: CLINIC | Age: 43
End: 2018-03-05
Payer: COMMERCIAL

## 2018-03-05 ENCOUNTER — RADIANT APPOINTMENT (OUTPATIENT)
Dept: BONE DENSITY | Facility: CLINIC | Age: 43
End: 2018-03-05
Attending: NURSE PRACTITIONER
Payer: COMMERCIAL

## 2018-03-05 VITALS
BODY MASS INDEX: 29.27 KG/M2 | HEIGHT: 61 IN | DIASTOLIC BLOOD PRESSURE: 84 MMHG | WEIGHT: 155 LBS | SYSTOLIC BLOOD PRESSURE: 126 MMHG

## 2018-03-05 DIAGNOSIS — M84.452D: Primary | ICD-10-CM

## 2018-03-05 DIAGNOSIS — M84.452D: ICD-10-CM

## 2018-03-05 DIAGNOSIS — Z98.84 BARIATRIC SURGERY STATUS: ICD-10-CM

## 2018-03-05 DIAGNOSIS — M85.80 OSTEOPENIA, UNSPECIFIED LOCATION: Primary | ICD-10-CM

## 2018-03-05 PROCEDURE — 99213 OFFICE O/P EST LOW 20 MIN: CPT | Performed by: PEDIATRICS

## 2018-03-05 PROCEDURE — 77080 DXA BONE DENSITY AXIAL: CPT | Performed by: INTERNAL MEDICINE

## 2018-03-05 PROCEDURE — 73560 X-RAY EXAM OF KNEE 1 OR 2: CPT | Mod: LT

## 2018-03-05 RX ORDER — ALENDRONATE SODIUM 70 MG/1
TABLET ORAL
Qty: 12 TABLET | Refills: 3 | Status: SHIPPED | OUTPATIENT
Start: 2018-03-05 | End: 2018-11-07 | Stop reason: SINTOL

## 2018-03-05 NOTE — LETTER
3/5/2018         RE: Yvonne Zarco  [address]  [place] [facility] [place] [address]        Dear Colleague,    Thank you for referring your patient, Yvonne Zarco, to the [facility]. Please see a copy of my visit note below.    Sports Medicine Clinic Visit - Interim History 2018      PCP: Rima Mccollum    Yvonne Zarco is a 42 year old female who is seen in f/u up for Insufficiency fracture of femur, left, with routine healing, subsequent encounter. Since last visit on 18, patient has had a decrease in pain and has not been using the crutches for the past week. Still has pain with kneeling or extreme flexion.  Overall feels ~ 90% better.  - Now ~ 11 weeks from initial injury, was on crutches ~ 3.5 weeks.  - Followed up with PCP and is starting medication for osteopenia.    Social History: [employer], office work    Review of Systems  Skin: no bruising, no swelling  Musculoskeletal: as above  Neurologic: no numbness, paresthesias  Remainder of review of systems is negative including constitutional, CV, pulmonary, GI, except as noted in HPI or medical history.    Patient's current problem list, past medical and surgical history, and family history were reviewed.    Patient Active Problem List   Diagnosis     GERD (gastroesophageal reflux disease)     Bariatric surgery status     Anxiety     Tobacco use disorder     Lumbago     Complex cyst of left ovary     Osteopenia, unspecified location     Past Medical History:   Diagnosis Date     Depressive disorder forever     Endometriosis     found at time of abdominal hysterectomy     Gastro-oesophageal reflux disease      Hiatal hernia      Hypertension      Past Surgical History:   Procedure Laterality Date      SECTION      x2     CHOLECYSTECTOMY       ESOPHAGOSCOPY, GASTROSCOPY, DUODENOSCOPY (EGD), COMBINED  2012    Procedure: COMBINED ESOPHAGOSCOPY, GASTROSCOPY, DUODENOSCOPY (EGD), BIOPSY SINGLE OR  "MULTIPLE;;  Surgeon: Scott Seth MD;  Location: [facility]     GENITOURINARY SURGERY  ??    bladder sling with hysterectomy early      GYN SURGERY      tubal ligation     hysterectomy with bladder sling      left ovaries     HYSTERECTOMY, PAP NO LONGER INDICATED      Abdominal hyst     LAPAROSCOPIC GASTRIC SLEEVE  5/10/2013    Procedure: LAPAROSCOPIC GASTRIC SLEEVE;  Laparoscopic Sleeve Gastrectomy with hiatal hernia repair;  Surgeon: Scott Seth MD;  Location: [facility] OR     LASIK BILATERAL       ORTHOPEDIC SURGERY      right foot surgery     Family History   Problem Relation Age of Onset     Hypertension Father           Substance Abuse Father         alcoholism     DIABETES Mother      diet controlled     Hypertension Mother      meds     Coronary Artery Disease Mother 67     quadruple bypass     Hyperlipidemia Mother      Substance Abuse Mother      alcoholism     Obesity Mother      Hypertension Sister      Anxiety Disorder Sister      Anxiety Disorder Daughter      Anxiety Disorder Niece      Hypertension Sister      meds     Anxiety Disorder Sister      no treated     Hyperlipidemia Sister      Brain Tumor Other      Heart Failure Other      Anxiety Disorder Daughter      on meds     Anesthesia Reaction Daughter      Asthma Daughter      Anxiety Disorder Niece      on meds     Substance Abuse Son      drug addiction     CANCER No family hx of      CEREBROVASCULAR DISEASE No family hx of      Thyroid Disease No family hx of      Glaucoma No family hx of      Macular Degeneration No family hx of        Objective  /84 (BP Location: Right arm, Patient Position: Sitting, Cuff Size: Adult Regular)  Ht 5' 1\" (1.549 m)  Wt 155 lb (70.3 kg)  BMI 29.29 kg/m2    GENERAL APPEARANCE: healthy, alert and no distress   GAIT: NORMAL  SKIN: no suspicious lesions or rashes  HEENT: Sclera clear, anicteric  CV: good peripheral pulses  RESP: Breathing not labored  NEURO: Normal strength " and tone, mentation intact and speech normal  PSYCH:  mentation appears normal and affect normal/bright    Bilateral Knee exam  Inspection:      no bruising, no swelling      Patella:      Normal patellar tracking noted through range of motion bilateral      Tender:      mild lateral knee femur      Non Tender:      remainder of knee area bilateral      Knee ROM:      Slightly decreased flexion of left knee compared to right      Strength:      5/5 with knee extension bilateral      Special Tests:     neg (-) Kaila left       neg (-) Lachmans left       neg (-) anterior drawer left       neg (-) posterior drawer left       neg (-) varus at 0 deg and 30 deg left       neg (-) valgus at 0 deg and 30 deg left      Neurovascular:      2+ peripheral pulses bilaterally and brisk capillary refill       sensation grossly intact    Radiology  I ordered, visualized and reviewed these images with the patient  AP and Lateral XR views of left knee reviewed: no acute bony abnormality, no significant degenerative change  - will follow official read    Assessment:  1. Insufficiency fracture of femur, left, with routine healing, subsequent encounter      Healing, though  to palpation.  Discussed continued rest from irritating activities, ok to walk if not having symptoms.   Very gradual return to activities.  Discussed role for PT in the future if symptoms persist.  Would consider repeat MRI at 3 months if needed.    Plan:  - Today's Plan of Care:  Rest from activities that cause pain    -We also discussed other future treatment options:  Physical therapy  Repeat MRI    Follow Up: 1 month as needed    Concerning signs and symptoms were reviewed.  The patient expressed understanding of this management plan and all questions were answered at this time.    Maritza Morin MD CAQ  Primary Care Sports Medicine  [facility]    Again, thank you for allowing me to participate in the care of your  patient.        Sincerely,        Maritza Morin MD

## 2018-03-05 NOTE — PROGRESS NOTES
Sports Medicine Clinic Visit - Interim History 2018      PCP: Rima Mccollumbraydon Zarco is a 42 year old female who is seen in f/u up for Insufficiency fracture of femur, left, with routine healing, subsequent encounter. Since last visit on 18, patient has had a decrease in pain and has not been using the crutches for the past week. Still has pain with kneeling or extreme flexion.  Overall feels ~ 90% better.  - Now ~ 11 weeks from initial injury, was on crutches ~ 3.5 weeks.  - Followed up with PCP and is starting medication for osteopenia.    Social History: [employer], office work    Review of Systems  Skin: no bruising, no swelling  Musculoskeletal: as above  Neurologic: no numbness, paresthesias  Remainder of review of systems is negative including constitutional, CV, pulmonary, GI, except as noted in HPI or medical history.    Patient's current problem list, past medical and surgical history, and family history were reviewed.    Patient Active Problem List   Diagnosis     GERD (gastroesophageal reflux disease)     Bariatric surgery status     Anxiety     Tobacco use disorder     Lumbago     Complex cyst of left ovary     Osteopenia, unspecified location     Past Medical History:   Diagnosis Date     Depressive disorder forever     Endometriosis     found at time of abdominal hysterectomy     Gastro-oesophageal reflux disease      Hiatal hernia      Hypertension      Past Surgical History:   Procedure Laterality Date      SECTION      x2     CHOLECYSTECTOMY       ESOPHAGOSCOPY, GASTROSCOPY, DUODENOSCOPY (EGD), COMBINED  2012    Procedure: COMBINED ESOPHAGOSCOPY, GASTROSCOPY, DUODENOSCOPY (EGD), BIOPSY SINGLE OR MULTIPLE;;  Surgeon: Scott Seth MD;  Location:  GI     GENITOURINARY SURGERY  ??    bladder sling with hysterectomy early      GYN SURGERY      tubal ligation     hysterectomy with bladder sling      left ovaries     HYSTERECTOMY, PAP NO LONGER  "INDICATED      Abdominal hyst     LAPAROSCOPIC GASTRIC SLEEVE  5/10/2013    Procedure: LAPAROSCOPIC GASTRIC SLEEVE;  Laparoscopic Sleeve Gastrectomy with hiatal hernia repair;  Surgeon: Scott Seth MD;  Location: [facility] OR     [facility] BILATERAL       ORTHOPEDIC SURGERY      right foot surgery     Family History   Problem Relation Age of Onset     Hypertension Father           Substance Abuse Father         alcoholism     DIABETES Mother      diet controlled     Hypertension Mother      meds     Coronary Artery Disease Mother 67     quadruple bypass     Hyperlipidemia Mother      Substance Abuse Mother      alcoholism     Obesity Mother      Hypertension Sister      Anxiety Disorder Sister      Anxiety Disorder Daughter      Anxiety Disorder Niece      Hypertension Sister      meds     Anxiety Disorder Sister      no treated     Hyperlipidemia Sister      Brain Tumor Other      Heart Failure Other      Anxiety Disorder Daughter      on meds     Anesthesia Reaction Daughter      Asthma Daughter      Anxiety Disorder Niece      on meds     Substance Abuse Son      drug addiction     CANCER No family hx of      CEREBROVASCULAR DISEASE No family hx of      Thyroid Disease No family hx of      Glaucoma No family hx of      Macular Degeneration No family hx of        Objective  /84 (BP Location: Right arm, Patient Position: Sitting, Cuff Size: Adult Regular)  Ht 5' 1\" (1.549 m)  Wt 155 lb (70.3 kg)  BMI 29.29 kg/m2    GENERAL APPEARANCE: healthy, alert and no distress   GAIT: NORMAL  SKIN: no suspicious lesions or rashes  HEENT: Sclera clear, anicteric  CV: good peripheral pulses  RESP: Breathing not labored  NEURO: Normal strength and tone, mentation intact and speech normal  PSYCH:  mentation appears normal and affect normal/bright    Bilateral Knee exam  Inspection:      no bruising, no swelling      Patella:      Normal patellar tracking noted through range of motion bilateral      Tender: "      mild lateral knee femur      Non Tender:      remainder of knee area bilateral      Knee ROM:      Slightly decreased flexion of left knee compared to right      Strength:      5/5 with knee extension bilateral      Special Tests:     neg (-) Kaila left       neg (-) Lachmans left       neg (-) anterior drawer left       neg (-) posterior drawer left       neg (-) varus at 0 deg and 30 deg left       neg (-) valgus at 0 deg and 30 deg left      Neurovascular:      2+ peripheral pulses bilaterally and brisk capillary refill       sensation grossly intact    Radiology  I ordered, visualized and reviewed these images with the patient  AP and Lateral XR views of left knee reviewed: no acute bony abnormality, no significant degenerative change  - will follow official read    Assessment:  1. Insufficiency fracture of femur, left, with routine healing, subsequent encounter      Healing, though  to palpation.  Discussed continued rest from irritating activities, ok to walk if not having symptoms.   Very gradual return to activities.  Discussed role for PT in the future if symptoms persist.  Would consider repeat MRI at 3 months if needed.    Plan:  - Today's Plan of Care:  Rest from activities that cause pain    -We also discussed other future treatment options:  Physical therapy  Repeat MRI    Follow Up: 1 month as needed    Concerning signs and symptoms were reviewed.  The patient expressed understanding of this management plan and all questions were answered at this time.    Maritza Morin MD [facility]  Primary Care Sports Medicine  [facility]

## 2018-03-05 NOTE — MR AVS SNAPSHOT
After Visit Summary   3/5/2018    Yvonne Zarco    MRN: [id number]           Patient Information     Date Of Birth          1975        Visit Information        Provider Department      3/5/2018 6:00 PM Maritza Morin MD [facility]        Today's Diagnoses     Insufficiency fracture of femur, left, with routine healing, subsequent encounter    -  1      Care Instructions    Plan:  - Today's Plan of Care:  Rest from activities that cause pain    -We also discussed other future treatment options:  Physical therapy  Repeat MRI    Follow Up: 1 month as needed              Follow-ups after your visit        Who to contact     If you have questions or need follow up information about today's clinic visit or your schedule please contact [facility] directly at [phone].  Normal or non-critical lab and imaging results will be communicated to you by MyChart, letter or phone within 4 business days after the clinic has received the results. If you do not hear from us within 7 days, please contact the clinic through MyChart or phone. If you have a critical or abnormal lab result, we will notify you by phone as soon as possible.  Submit refill requests through [employer] or call your pharmacy and they will forward the refill request to us. Please allow 3 business days for your refill to be completed.          Additional Information About Your Visit        MyChart Information     [employer] gives you secure access to your electronic health record. If you see a primary care provider, you can also send messages to your care team and make appointments. If you have questions, please call your primary care clinic.  If you do not have a primary care provider, please call [phone] and they will assist you.        Care EveryWhere ID     This is your Care EveryWhere ID. This could be used by other organizations to access your [facility]  "records  [id number]        Your Vitals Were     Height BMI (Body Mass Index)                5' 1\" (1.549 m) 29.29 kg/m2           Blood Pressure from Last 3 Encounters:   03/05/18 126/84   02/28/18 130/74   02/27/18 113/73    Weight from Last 3 Encounters:   03/05/18 155 lb (70.3 kg)   02/28/18 155 lb (70.3 kg)   02/27/18 155 lb 4.8 oz (70.4 kg)                 Today's Medication Changes          These changes are accurate as of 3/5/18  6:23 PM.  If you have any questions, ask your nurse or doctor.               Start taking these medicines.        Dose/Directions    alendronate 70 MG tablet   Commonly known as:  FOSAMAX   Used for:  Bariatric surgery status, Osteopenia, unspecified location        Take 1 tablet (70 mg) by mouth with 8oz water every 7 days 30 minutes before breakfast and remain upright during this time.   Quantity:  12 tablet   Refills:  3            Where to get your medicines      These medications were sent to [address] [address]     Phone:  [phone]     alendronate 70 MG tablet                Primary Care Provider Office Phone # Fax #    ISH Zurita [facility] [phone]       [address] [facility] [address]        Equal Access to Services     HERLINDA QUEZADA AH: Hadii patrick ku hadasho Sodanielali, waaxda luqadaha, qaybta kaalmada adeegyada, surjit telles. So [facility] [phone].    ATENCIÓN: Si habla español, tiene a javier disposición servicios gratuitos de asistencia lingüística. Llame al [phone].    We comply with applicable federal civil rights laws and [place] laws. We do not discriminate on the basis of race, color, national origin, age, disability, sex, sexual orientation, or gender identity.            Thank you!     Thank you for choosing [facility]  for your care. Our goal is always to provide " you with excellent care. Hearing back from our patients is one way we can continue to improve our services. Please take a few minutes to complete the written survey that you may receive in the mail after your visit with us. Thank you!             Your Updated Medication List - Protect others around you: Learn how to safely use, store and throw away your medicines at www.disposemymeds.org.          This list is accurate as of 3/5/18  6:23 PM.  Always use your most recent med list.                   Brand Name Dispense Instructions for use Diagnosis    alendronate 70 MG tablet    FOSAMAX    12 tablet    Take 1 tablet (70 mg) by mouth with 8oz water every 7 days 30 minutes before breakfast and remain upright during this time.    Bariatric surgery status, Osteopenia, unspecified location       benzoyl peroxide 5 % Liqd     120 mL    Apply to entire face and then wash off after 5 min daily. Then apply topical clindamycin. May bleach towels.    Acne       buPROPion 150 MG 12 hr tablet    WELLBUTRIN SR    60 tablet    Take 1 tablet once daily and increase to 1 tablet twice daily after 4 to 7 days    Tobacco use disorder       calcium Citrate-vitamin D 500-400 MG-UNIT Chew     60 tablet    Take 500 mg by mouth 2 times daily    Unspecified intestinal malabsorption, Status post bariatric surgery       * VITAMIN B 12 PO      Take 500 mcg by mouth daily        * cyanocobalamin 1000 MCG/ML injection    VITAMIN B12    3 mL    Inject 1 mL (1,000 mcg) into the muscle every 30 days    Bariatric surgery status       DAILY MULTIVITAMIN PO      Take  by mouth. Take 2 chewable tablets daily    Bariatric surgery status       diclofenac 1 % Gel topical gel    VOLTAREN    100 g    Apply 4 grams to knees or 2 grams to hands four times daily using enclosed dosing card.    Acute left-sided low back pain with left-sided sciatica       esomeprazole 20 MG CR capsule    nexIUM    84 capsule    Take 1 capsule (20 mg) by mouth 2 times daily Take  30-60 minutes before eating.    Gastroesophageal reflux disease, esophagitis presence not specified       * FLEXERIL PO           * cyclobenzaprine 10 MG tablet    FLEXERIL    30 tablet    Take 0.5-1 tablets (5-10 mg) by mouth 3 times daily as needed for muscle spasms    Acute left-sided low back pain with left-sided sciatica       mometasone 50 MCG/ACT spray    NASONEX     Spray 2 sprays into both nostrils daily        order for DME     1 each    Injection Supplies for Vitamin B12: 3cc syringes w/ 27 gauge needles, 1/2 inch length    Bariatric surgery status       order for DME     1 each    Equipment being ordered: crutches    Acute left-sided low back pain with left-sided sciatica       PARoxetine 40 MG tablet    PAXIL    90 tablet    Take 0.5 tablets (20 mg) by mouth At Bedtime    Anxiety       traMADol 50 MG tablet    ULTRAM    20 tablet    Take 1 tablet (50 mg) by mouth every 8 hours as needed for pain    Insufficiency fracture of left femur with routine healing, subsequent encounter       UNJURY Powd     2 Bottle    Take 21 g by mouth daily Unjury 21 gram-100 kcal/27 gram Powder    Bariatric surgery status       * Notice:  This list has 4 medication(s) that are the same as other medications prescribed for you. Read the directions carefully, and ask your doctor or other care provider to review them with you.

## 2018-03-06 NOTE — PATIENT INSTRUCTIONS
Plan:  - Today's Plan of Care:  Rest from activities that cause pain    -We also discussed other future treatment options:  Physical therapy  Repeat MRI    Follow Up: 1 month as needed

## 2018-04-02 ENCOUNTER — RADIANT APPOINTMENT (OUTPATIENT)
Dept: ULTRASOUND IMAGING | Facility: CLINIC | Age: 43
End: 2018-04-02
Attending: OBSTETRICS & GYNECOLOGY
Payer: COMMERCIAL

## 2018-04-02 DIAGNOSIS — N83.202 LEFT OVARIAN CYST: ICD-10-CM

## 2018-04-02 PROCEDURE — 76856 US EXAM PELVIC COMPLETE: CPT

## 2018-04-02 PROCEDURE — 76830 TRANSVAGINAL US NON-OB: CPT

## 2018-04-03 ENCOUNTER — OFFICE VISIT (OUTPATIENT)
Dept: OBGYN | Facility: CLINIC | Age: 43
End: 2018-04-03
Payer: COMMERCIAL

## 2018-04-03 VITALS
HEART RATE: 101 BPM | WEIGHT: 157 LBS | TEMPERATURE: 98.1 F | BODY MASS INDEX: 29.66 KG/M2 | DIASTOLIC BLOOD PRESSURE: 84 MMHG | SYSTOLIC BLOOD PRESSURE: 137 MMHG

## 2018-04-03 DIAGNOSIS — N80.129 ENDOMETRIOMA OF OVARY: Primary | ICD-10-CM

## 2018-04-03 PROCEDURE — 99213 OFFICE O/P EST LOW 20 MIN: CPT | Performed by: OBSTETRICS & GYNECOLOGY

## 2018-04-03 NOTE — PROGRESS NOTES
CC:  Ultrasound follow-up    Yvonne Zarco presents today to discuss repeat US results.  Has been feeling well overall since her first visit, but does note a discomfort and fullness in her LLQ.  Also has pain on that side during intercourse.    FINDINGS:  Transvaginal images were performed to better evaluate the  patient's uterus, ovaries and endometrial stripe.     The uterus is surgically absent. The right ovary is only partially  visualized due to overlying bowel gas and measures approximately 2.6 x  2.0 x 3.0 cm. The left ovary measures 6.0 x 3.7 x 5.6 cm and is nearly  completely replaced by a large complex cystic lesion without internal  color Doppler flow, again most likely a hemorrhagic cyst or  endometrioma. This previously measured 5.8 x 4.2 x 6.3 cm.  No adnexal  masses are present. No free pelvic fluid is present.         IMPRESSION: Limited exam due to bowel gas. Large complex cystic lesion  left ovary is unchanged compared to prior exam. No free pelvic fluid.  Prior hysterectomy.      Discussed that left ovarian cyst is still present, likely endometrioma based on appearance and her history of endometriosis.  Discussed surgical removal, as well as risks of surgery for her.  Aside of history of endometriosis, her history is also remarkable for total abdominal hysterectomy, gastric sleeve, bladder sling, laparoscopic cholecystectomy and  x 2.  Other than a Pfannenstiel, her surgical incisions are largely above the umbilicus, so would attempt laparoscopy, although may have to convert to laparotomy depending on surgical findings.  We reviewed the risks in detail, including infection, blood loss and injury to other abdominal structures including bladder, bowels, ureter.  Given her history, this risks are very real.     Patient will consider and talk with her .  Will have surgery scheduler contact her, and she will let us know if she changes her mind about proceeding with surgery.  Discussed  that she would need PCP clearance prior to going to OR.    All questions answered.    Maritza Fowler MD    Please note greater than 50% of this 15 minute appointment were spent in counseling with the patient on issues described above in the history of present illness and in the plan, including evaluation and management of ovarian cysts, specifically endometrioma, and details of surgical removal.

## 2018-04-03 NOTE — NURSING NOTE
"Chief Complaint   Patient presents with     RECHECK     Ultrasound       Initial /84  Pulse 101  Temp 98.1  F (36.7  C) (Oral)  Wt 157 lb (71.2 kg)  BMI 29.66 kg/m2 Estimated body mass index is 29.66 kg/(m^2) as calculated from the following:    Height as of 3/5/18: 5' 1\" (1.549 m).    Weight as of this encounter: 157 lb (71.2 kg).  BP completed using cuff size: regular        The following HM Due: NONE      The following patient reported/Care Every where data was sent to:  P ABSTRACT QUALITY INITIATIVES [15324]  PACO [place]           "

## 2018-04-03 NOTE — MR AVS SNAPSHOT
After Visit Summary   4/3/2018    Yvonne Zarco    MRN: [id number]           Patient Information     Date Of Birth          1975        Visit Information        Provider Department      4/3/2018 2:30 PM Maritza Fowler MD [facility]        Today's Diagnoses     Endometrioma of ovary    -  1       Follow-ups after your visit        Who to contact     If you have questions or need follow up information about today's clinic visit or your schedule please contact [facility] directly at [phone].  Normal or non-critical lab and imaging results will be communicated to you by [employer], letter or phone within 4 business days after the clinic has received the results. If you do not hear from us within 7 days, please contact the clinic through [employer] or phone. If you have a critical or abnormal lab result, we will notify you by phone as soon as possible.  Submit refill requests through [employer] or call your pharmacy and they will forward the refill request to us. Please allow 3 business days for your refill to be completed.          Additional Information About Your Visit        MyChart Information     [employer] gives you secure access to your electronic health record. If you see a primary care provider, you can also send messages to your care team and make appointments. If you have questions, please call your primary care clinic.  If you do not have a primary care provider, please call [phone] and they will assist you.        Care EveryWhere ID     This is your Care EveryWhere ID. This could be used by other organizations to access your [place] medical records  [id number]        Your Vitals Were     Pulse Temperature BMI (Body Mass Index)             101 98.1  F (36.7  C) (Oral) 29.66 kg/m2          Blood Pressure from Last 3 Encounters:   04/03/18 137/84   03/05/18 126/84   02/28/18 130/74    Weight from Last 3 Encounters:   04/03/18 157 lb (71.2 kg)    03/05/18 155 lb (70.3 kg)   02/28/18 155 lb (70.3 kg)              We Performed the Following     Yani-Operative Worksheet        Primary Care Provider Office Phone # Fax Louise MoreiraISH Arellano [phone] [address] [facility] [place] [address]        Equal Access to Services     [facility]: Hadii aad ku hadasho Soomaali, waaxda luqadaha, qaybta kaalmada adeegyada, waxay idiin hayaan adeeg kharash la'aan ah. So [facility] [phone].    ATENCIÓN: Si habla español, tiene a javier disposición servicios gratuitos de asistencia lingüística. Masha al [phone].    We comply with applicable federal civil rights laws and [place] laws. We do not discriminate on the basis of race, color, national origin, age, disability, sex, sexual orientation, or gender identity.            Thank you!     Thank you for choosing [facility]  for your care. Our goal is always to provide you with excellent care. Hearing back from our patients is one way we can continue to improve our services. Please take a few minutes to complete the written survey that you may receive in the mail after your visit with us. Thank you!             Your Updated Medication List - Protect others around you: Learn how to safely use, store and throw away your medicines at www.disposemymeds.org.          This list is accurate as of 4/3/18 11:59 PM.  Always use your most recent med list.                   Brand Name Dispense Instructions for use Diagnosis    alendronate 70 MG tablet    FOSAMAX    12 tablet    Take 1 tablet (70 mg) by mouth with 8oz water every 7 days 30 minutes before breakfast and remain upright during this time.    Bariatric surgery status, Osteopenia, unspecified location       benzoyl peroxide 5 % Liqd     120 mL    Apply to entire face and then wash off after 5 min daily. Then apply topical clindamycin. May bleach towels.    Acne       buPROPion 150 MG 12 hr tablet    WELLBUTRIN SR    60 tablet     Take 1 tablet once daily and increase to 1 tablet twice daily after 4 to 7 days    Tobacco use disorder       calcium Citrate-vitamin D 500-400 MG-UNIT Chew     60 tablet    Take 500 mg by mouth 2 times daily    Unspecified intestinal malabsorption, Status post bariatric surgery       * VITAMIN B 12 PO      Take 500 mcg by mouth daily        * cyanocobalamin 1000 MCG/ML injection    VITAMIN B12    3 mL    Inject 1 mL (1,000 mcg) into the muscle every 30 days    Bariatric surgery status       DAILY MULTIVITAMIN PO      Take  by mouth. Take 2 chewable tablets daily    Bariatric surgery status       diclofenac 1 % Gel topical gel    VOLTAREN    100 g    Apply 4 grams to knees or 2 grams to hands four times daily using enclosed dosing card.    Acute left-sided low back pain with left-sided sciatica       esomeprazole 20 MG CR capsule    nexIUM    84 capsule    Take 1 capsule (20 mg) by mouth 2 times daily Take 30-60 minutes before eating.    Gastroesophageal reflux disease, esophagitis presence not specified       * FLEXERIL PO           * cyclobenzaprine 10 MG tablet    FLEXERIL    30 tablet    Take 0.5-1 tablets (5-10 mg) by mouth 3 times daily as needed for muscle spasms    Acute left-sided low back pain with left-sided sciatica       mometasone 50 MCG/ACT spray    NASONEX     Spray 2 sprays into both nostrils daily        order for DME     1 each    Injection Supplies for Vitamin B12: 3cc syringes w/ 27 gauge needles, 1/2 inch length    Bariatric surgery status       order for DME     1 each    Equipment being ordered: crutches    Acute left-sided low back pain with left-sided sciatica       PARoxetine 40 MG tablet    PAXIL    90 tablet    Take 0.5 tablets (20 mg) by mouth At Bedtime    Anxiety       traMADol 50 MG tablet    ULTRAM    20 tablet    Take 1 tablet (50 mg) by mouth every 8 hours as needed for pain    Insufficiency fracture of left femur with routine healing, subsequent encounter       UNJURY Powd      2 Bottle    Take 21 g by mouth daily Unjury 21 gram-100 kcal/27 gram Powder    Bariatric surgery status       * Notice:  This list has 4 medication(s) that are the same as other medications prescribed for you. Read the directions carefully, and ask your doctor or other care provider to review them with you.

## 2018-04-13 ENCOUNTER — TELEPHONE (OUTPATIENT)
Dept: OBGYN | Facility: CLINIC | Age: 43
End: 2018-04-13

## 2018-04-13 NOTE — TELEPHONE ENCOUNTER
Type of surgery: Gyn  Location of surgery: [facility] OR  Date and time of surgery: 5/31/18 730a  Surgeon: TAMEKA Fowler  Pre-Op Appt Date: PCP  Post-Op Appt Date: na   Packet sent out: Yes  Pre-cert/Authorization completed:  No  Date: 4/13/2018  Nallely Melara MA

## 2018-04-14 ENCOUNTER — MYC REFILL (OUTPATIENT)
Dept: FAMILY MEDICINE | Facility: CLINIC | Age: 43
End: 2018-04-14

## 2018-04-14 DIAGNOSIS — F17.200 TOBACCO USE DISORDER: ICD-10-CM

## 2018-04-16 RX ORDER — BUPROPION HYDROCHLORIDE 150 MG/1
TABLET, EXTENDED RELEASE ORAL
Qty: 60 TABLET | Refills: 2 | Status: SHIPPED | OUTPATIENT
Start: 2018-04-16 | End: 2018-08-05

## 2018-04-16 NOTE — TELEPHONE ENCOUNTER
Routing refill request to provider for review/approval because:  A break in medication  Using for tobacco use disorder      Torsten Brady RN

## 2018-04-16 NOTE — TELEPHONE ENCOUNTER
Message from MyChart:  Original authorizing provider: ISH Zurita CNP would like a refill of the following medications:  buPROPion (WELLBUTRIN SR) 150 MG 12 hr tablet [ISH Zurita CNP]    Preferred pharmacy: [facility], [place] - [address] [place] [address]    Comment:

## 2018-05-01 RX ORDER — CEFAZOLIN SODIUM 2 G/100ML
2 INJECTION, SOLUTION INTRAVENOUS
Status: CANCELLED | OUTPATIENT
Start: 2018-05-31

## 2018-05-01 RX ORDER — CEFAZOLIN SODIUM 1 G/3ML
1 INJECTION, POWDER, FOR SOLUTION INTRAMUSCULAR; INTRAVENOUS SEE ADMIN INSTRUCTIONS
Status: CANCELLED | OUTPATIENT
Start: 2018-05-31

## 2018-05-15 ENCOUNTER — OFFICE VISIT (OUTPATIENT)
Dept: FAMILY MEDICINE | Facility: CLINIC | Age: 43
End: 2018-05-15
Payer: COMMERCIAL

## 2018-05-15 VITALS
OXYGEN SATURATION: 99 % | WEIGHT: 155 LBS | HEART RATE: 95 BPM | HEIGHT: 61 IN | DIASTOLIC BLOOD PRESSURE: 84 MMHG | SYSTOLIC BLOOD PRESSURE: 118 MMHG | TEMPERATURE: 98.5 F | RESPIRATION RATE: 14 BRPM | BODY MASS INDEX: 29.27 KG/M2

## 2018-05-15 DIAGNOSIS — F17.200 TOBACCO USE DISORDER: ICD-10-CM

## 2018-05-15 DIAGNOSIS — Z01.818 PREOP GENERAL PHYSICAL EXAM: Primary | ICD-10-CM

## 2018-05-15 DIAGNOSIS — K21.9 GASTROESOPHAGEAL REFLUX DISEASE, ESOPHAGITIS PRESENCE NOT SPECIFIED: ICD-10-CM

## 2018-05-15 DIAGNOSIS — M85.80 OSTEOPENIA, UNSPECIFIED LOCATION: ICD-10-CM

## 2018-05-15 LAB — HGB BLD-MCNC: 13.5 G/DL (ref 11.7–15.7)

## 2018-05-15 PROCEDURE — 99214 OFFICE O/P EST MOD 30 MIN: CPT | Performed by: FAMILY MEDICINE

## 2018-05-15 PROCEDURE — 36415 COLL VENOUS BLD VENIPUNCTURE: CPT | Performed by: FAMILY MEDICINE

## 2018-05-15 PROCEDURE — 85018 HEMOGLOBIN: CPT | Performed by: FAMILY MEDICINE

## 2018-05-15 NOTE — MR AVS SNAPSHOT
After Visit Summary   5/15/2018    Yvonne Zarco    MRN: [id number]           Patient Information     Date Of Birth          1975        Visit Information        Provider Department      5/15/2018 4:40 PM Yazmin Egan MD [facility]        Today's Diagnoses     Preop general physical exam    -  1    Tobacco use disorder        Osteopenia, unspecified location        Gastroesophageal reflux disease, esophagitis presence not specified          Care Instructions    [facility]    If you have any questions regarding to your visit please contact your care team:       Team Red:   Clinic Hours Telephone Number   Dr. Ebonie Ruvalcaba  (pediatrics)  Rima Mccollum NP 7am-7pm  Monday - Thursday   7am-5pm  Fridays  [phone] (fax)    Gerda COSTELLO  [phone]   Urgent Care - [place] and [place] Monday-Friday  [place] - 11am-8pm  Saturday-Sunday  Both sites - 9am-5pm  [phone] - [facility]  [phone] - [place]       What options do I have for visits at the clinic other than the traditional office visit?  To expand how we care for you, many of our providers are utilizing electronic visits (e-visits) and telephone visits, when medically appropriate, for interactions with their patients rather than a visit in the clinic.   We also offer nurse visits for many medical concerns. Just like any other service, we will bill your insurance company for this type of visit based on time spent on the phone with your provider. Not all insurance companies cover these visits. Please check with your medical insurance if this type of visit is covered. You will be responsible for any charges that are not paid by your insurance.      E-visits via [employer]:  generally incur a $35.00 fee.  Telephone visits:  Time spent on the phone: *charged based on time that is spent on the phone in increments of 10 minutes. Estimated cost:   5-10  mins $30.00   11-20 mins. $59.00   21-30 mins. $85.00     As always, Thank you for trusting us with your health care needs!                Before Your Surgery      Call your surgeon if there is any change in your health. This includes signs of a cold or flu (such as a sore throat, runny nose, cough, rash or fever).    Do not smoke, drink alcohol or take over the counter medicine (unless your surgeon or primary care doctor tells you to) for the 24 hours before and after surgery.    If you take prescribed drugs: Follow your doctor s orders about which medicines to take and which to stop until after surgery.    Eating and drinking prior to surgery: follow the instructions from your surgeon    Take a shower or bath the night before surgery. Use the soap your surgeon gave you to gently clean your skin. If you do not have soap from your surgeon, use your regular soap. Do not shave or scrub the surgery site.  Wear clean pajamas and have clean sheets on your bed.   Discharged By:    LESLEE BIRCH              Follow-ups after your visit        Your next 10 appointments already scheduled     May 29, 2018  5:00 PM CDT   LAB with [facility]   [facility]    [address] [address] [phone]           Please do not eat 10-12 hours before your appointment if you are coming in fasting for labs on lipids, cholesterol, or glucose (sugar). This does not apply to pregnant women. Water, hot tea and black coffee (with nothing added) are okay. Do not drink other fluids, diet soda or chew gum.            May 31, 2018   Procedure with Maritza Fowler MD   [facility] [place], Same Day Surgery (--)    [address] [facility] [address] [phone]              Who to contact     If you have questions or need follow up information about today's clinic visit or your schedule please contact [facility] directly at [phone].  Normal or non-critical lab and imaging  "results will be communicated to you by MyChart, letter or phone within 4 business days after the clinic has received the results. If you do not hear from us within 7 days, please contact the clinic through [email or web address] or phone. If you have a critical or abnormal lab result, we will notify you by phone as soon as possible.  Submit refill requests through [email or web address] or call your pharmacy and they will forward the refill request to us. Please allow 3 business days for your refill to be completed.          Additional Information About Your Visit        [email or web address] Information     [email or web address] gives you secure access to your electronic health record. If you see a primary care provider, you can also send messages to your care team and make appointments. If you have questions, please call your primary care clinic.  If you do not have a primary care provider, please call [phone] and they will assist you.        Care EveryWhere ID     This is your Care EveryWhere ID. This could be used by other organizations to access your [place] medical records  [id number]        Your Vitals Were     Pulse Temperature Respirations Height Pulse Oximetry BMI (Body Mass Index)    95 98.5  F (36.9  C) 14 5' 1\" (1.549 m) 99% 29.29 kg/m2       Blood Pressure from Last 3 Encounters:   05/15/18 118/84   04/03/18 137/84   03/05/18 126/84    Weight from Last 3 Encounters:   05/15/18 155 lb (70.3 kg)   04/03/18 157 lb (71.2 kg)   03/05/18 155 lb (70.3 kg)              We Performed the Following     Hemoglobin          Today's Medication Changes          These changes are accurate as of 5/15/18  5:02 PM.  If you have any questions, ask your nurse or doctor.               Stop taking these medicines if you haven't already. Please contact your care team if you have questions.     cyclobenzaprine 10 MG tablet   Commonly known as:  FLEXERIL   Stopped by:  Yazmin Egan MD           diclofenac 1 % Gel topical gel   Commonly known as:  VOLTAREN   Stopped by:  " Yazmin Egan MD           FLEXERIL PO   Stopped by:  Yazmin Egan MD           order for DME   Stopped by:  Yazmin Egan MD           traMADol 50 MG tablet   Commonly known as:  ULTRAM   Stopped by:  Yazmin Egan MD                    Primary Care Provider Office Phone # Fax ISH Chin [phone] [address] [facility] [address]        Equal Access to Services     [facility]: Hadii aad ku hadasho Soomaali, waaxda luqadaha, qaybta kaalmada adeegyada, waxay idiin hayaan adeeg kharash la'aan . So [facility] [phone].    ATENCIÓN: Si paigela espjulian, tiene a javier disposición servicios gratuitos de asistencia lingüística. [facility] [phone].    We comply with applicable federal civil rights laws and [place] laws. We do not discriminate on the basis of race, color, national origin, age, disability, sex, sexual orientation, or gender identity.            Thank you!     Thank you for choosing [facility]  for your care. Our goal is always to provide you with excellent care. Hearing back from our patients is one way we can continue to improve our services. Please take a few minutes to complete the written survey that you may receive in the mail after your visit with us. Thank you!             Your Updated Medication List - Protect others around you: Learn how to safely use, store and throw away your medicines at www.disposemymeds.org.          This list is accurate as of 5/15/18  5:02 PM.  Always use your most recent med list.                   Brand Name Dispense Instructions for use Diagnosis    alendronate 70 MG tablet    FOSAMAX    12 tablet    Take 1 tablet (70 mg) by mouth with 8oz water every 7 days 30 minutes before breakfast and remain upright during this time.    Bariatric surgery status, Osteopenia, unspecified location       benzoyl peroxide 5 % Liqd     120 mL    Apply to entire face and then wash off after 5 min daily. Then apply topical  clindamycin. May bleach towels.    Acne       buPROPion 150 MG 12 hr tablet    WELLBUTRIN SR    60 tablet    Take 1 tablet once daily and increase to 1 tablet twice daily after 4 to 7 days    Tobacco use disorder       calcium Citrate-vitamin D 500-400 MG-UNIT Chew     60 tablet    Take 500 mg by mouth 2 times daily    Unspecified intestinal malabsorption, Status post bariatric surgery       * VITAMIN B 12 PO      Take 500 mcg by mouth daily        * cyanocobalamin 1000 MCG/ML injection    VITAMIN B12    3 mL    Inject 1 mL (1,000 mcg) into the muscle every 30 days    Bariatric surgery status       DAILY MULTIVITAMIN PO      Take  by mouth. Take 2 chewable tablets daily    Bariatric surgery status       esomeprazole 20 MG CR capsule    nexIUM    84 capsule    Take 1 capsule (20 mg) by mouth 2 times daily Take 30-60 minutes before eating.    Gastroesophageal reflux disease, esophagitis presence not specified       mometasone 50 MCG/ACT spray    NASONEX     Spray 2 sprays into both nostrils daily        order for DME     1 each    Equipment being ordered: crutches    Acute left-sided low back pain with left-sided sciatica       PARoxetine 40 MG tablet    PAXIL    90 tablet    Take 0.5 tablets (20 mg) by mouth At Bedtime    Anxiety       UNJURY Powd     2 Bottle    Take 21 g by mouth daily Unjury 21 gram-100 kcal/27 gram Powder    Bariatric surgery status       * Notice:  This list has 2 medication(s) that are the same as other medications prescribed for you. Read the directions carefully, and ask your doctor or other care provider to review them with you.

## 2018-05-15 NOTE — PROGRESS NOTES
[facility]  [address] [place] [id number]  [phone]  Dept: [phone]    PRE-OP EVALUATION:  Today's date: 5/15/2018    Yvonne Zarco (: 1975) presents for pre-operative evaluation assessment as requested by Janeth Horowitz.  She requires evaluation and anesthesia risk assessment prior to undergoing surgery/procedure for treatment of Laparoscopic cystectomy ovarian left side .  Pt has a complex Ovarian cyst    Proposed Surgery/ Procedure:   Date of Surgery/ Procedure: 18  Time of Surgery/ Procedure: 6:00am  Hospital/Surgical Facility: [facility]  Fax number for surgical facility:   Primary Physician: Rima Mccollum  Type of Anesthesia Anticipated: General    Patient has a Health Care Directive or Living Will:  NO    1. NO - Do you have a history of heart attack, stroke, stent, bypass or surgery on an artery in the head, neck, heart or legs?  2. NO - Do you ever have any pain or discomfort in your chest?  3. NO - Do you have a history of  Heart Failure?  4. NO - Are you troubled by shortness of breath when: walking on the level, up a slight hill or at night?  5. NO - Do you currently have a cold, bronchitis or other respiratory infection?  6. NO - Do you have a cough, shortness of breath or wheezing?  7. NO - Do you sometimes get pains in the calves of your legs when you walk?  8. NO - Do you or anyone in your family have previous history of blood clots?  9. NO- Do you or does anyone in your family have a serious bleeding problem such as prolonged bleeding following surgeries or cuts?  10. NO - Have you ever had problems with anemia or been told to take iron pills?  11. yes - Have you had any abnormal blood loss such as black, tarry or bloody stools, or abnormal vaginal bleeding?  12. NO - Have you ever had a blood transfusion?  13. yes - Have you or any of your relatives ever had problems with anesthesia?  14. NO - Do you have sleep apnea, excessive snoring  or daytime drowsiness?  15. NO - Do you have any prosthetic heart valves?  16. NO - Do you have prosthetic joints?  17. NO - Is there any chance that you may be pregnant?      HPI:     HPI related to upcoming procedure: age 25--Pt scheduled for above  Pt has had a Hysterectomy  Age 25        MEDICAL HISTORY:     Patient Active Problem List    Diagnosis Date Noted     Osteopenia, unspecified location 2018     Priority: Medium     Complex cyst of left ovary 2018     Priority: Medium     Lumbago 2018     Priority: Medium     Bariatric surgery status 2014     Priority: Medium     Gastric sleeve 2013       Anxiety 2014     Priority: Medium     Tobacco use disorder 2014     Priority: Medium     GERD (gastroesophageal reflux disease) 12/10/2012     Priority: Medium      Past Medical History:   Diagnosis Date     Depressive disorder forever     Endometriosis     found at time of abdominal hysterectomy     Gastro-oesophageal reflux disease      Hiatal hernia      Hypertension      Past Surgical History:   Procedure Laterality Date      SECTION      x2     CHOLECYSTECTOMY       ESOPHAGOSCOPY, GASTROSCOPY, DUODENOSCOPY (EGD), COMBINED  2012    Procedure: COMBINED ESOPHAGOSCOPY, GASTROSCOPY, DUODENOSCOPY (EGD), BIOPSY SINGLE OR MULTIPLE;;  Surgeon: Scott Steh MD;  Location:  GI     GENITOURINARY SURGERY  ??    bladder sling with hysterectomy early      GYN SURGERY      tubal ligation     hysterectomy with bladder sling      left ovaries     HYSTERECTOMY, PAP NO LONGER INDICATED      Abdominal hyst     LAPAROSCOPIC GASTRIC SLEEVE  5/10/2013    Procedure: LAPAROSCOPIC GASTRIC SLEEVE;  Laparoscopic Sleeve Gastrectomy with hiatal hernia repair;  Surgeon: Scott Seth MD;  Location:  OR     LASIK BILATERAL       ORTHOPEDIC SURGERY      right foot surgery     Current Outpatient Prescriptions   Medication Sig Dispense Refill     alendronate  (FOSAMAX) 70 MG tablet Take 1 tablet (70 mg) by mouth with 8oz water every 7 days 30 minutes before breakfast and remain upright during this time. 12 tablet 3     benzoyl peroxide 5 % LIQD Apply to entire face and then wash off after 5 min daily. Then apply topical clindamycin. May bleach towels. 120 mL 11     buPROPion (WELLBUTRIN SR) 150 MG 12 hr tablet Take 1 tablet once daily and increase to 1 tablet twice daily after 4 to 7 days 60 tablet 2     calcium Citrate-vitamin D 500-400 MG-UNIT CHEW Take 500 mg by mouth 2 times daily 60 tablet prn     Cyanocobalamin (VITAMIN B 12 PO) Take 500 mcg by mouth daily        cyanocobalamin (VITAMIN B12) 1000 MCG/ML injection Inject 1 mL (1,000 mcg) into the muscle every 30 days 3 mL 3     esomeprazole (NEXIUM) 20 MG CR capsule Take 1 capsule (20 mg) by mouth 2 times daily Take 30-60 minutes before eating. 84 capsule 8     mometasone (NASONEX) 50 MCG/ACT spray Spray 2 sprays into both nostrils daily       Multiple Vitamin (DAILY MULTIVITAMIN PO) Take  by mouth. Take 2 chewable tablets daily       order for DME Equipment being ordered: crutches 1 each 0     PARoxetine (PAXIL) 40 MG tablet Take 0.5 tablets (20 mg) by mouth At Bedtime 90 tablet 3     Protein (UNJURY) POWD Take 21 g by mouth daily Unjury 21 gram-100 kcal/27 gram Powder 2 Bottle 11     OTC products: None, except as noted above    Allergies   Allergen Reactions     Ciprofloxacin Itching      Latex Allergy: NO    Social History   Substance Use Topics     Smoking status: Current Some Day Smoker     Packs/day: 0.50     Years: 20.00     Types: Cigarettes     Last attempt to quit: 12/11/2017     Smokeless tobacco: Never Used      Comment: Tried many times     Alcohol use No     History   Drug Use No     Social History     Social History     Marital status:      Spouse name: N/A     Number of children: N/A     Years of education: N/A     Occupational History     Not on file.     Social History Main Topics      Smoking status: Current Some Day Smoker     Packs/day: 0.50     Years: 20.00     Types: Cigarettes     Last attempt to quit: 2017     Smokeless tobacco: Never Used      Comment: Tried many times     Alcohol use No     Drug use: No     Sexual activity: Yes     Partners: Male     Birth control/ protection: Female Surgical, None      Comment: tubal and hysterectomy     Other Topics Concern     Parent/Sibling W/ Cabg, Mi Or Angioplasty Before 65f 55m? Yes     Mother     Social History Narrative     Family History   Problem Relation Age of Onset     Hypertension Father           Substance Abuse Father         alcoholism     DIABETES Mother      diet controlled     Hypertension Mother      meds     Coronary Artery Disease Mother 67     quadruple bypass     Hyperlipidemia Mother      Substance Abuse Mother      alcoholism     Obesity Mother      Hypertension Sister      Anxiety Disorder Sister      Anxiety Disorder Daughter      Anxiety Disorder Niece      Hypertension Sister      meds     Anxiety Disorder Sister      no treated     Hyperlipidemia Sister      Brain Tumor Other      Heart Failure Other      Anxiety Disorder Daughter      on meds     Anesthesia Reaction Daughter      Asthma Daughter      Anxiety Disorder Niece      on meds     Substance Abuse Son      drug addiction     CANCER No family hx of      CEREBROVASCULAR DISEASE No family hx of      Thyroid Disease No family hx of      Glaucoma No family hx of      Macular Degeneration No family hx of        REVIEW OF SYSTEMS:   CONSTITUTIONAL: NEGATIVE for fever, chills, change in weight  INTEGUMENTARY/SKIN: NEGATIVE for worrisome rashes, moles or lesions  EYES: NEGATIVE for vision changes or irritation  ENT/MOUTH: NEGATIVE for ear, mouth and throat problems  RESP: NEGATIVE for significant cough or SOB  BREAST: NEGATIVE for masses, tenderness or discharge  CV: NEGATIVE for chest pain, palpitations or peripheral edema  GI: NEGATIVE for nausea,  "abdominal pain, heartburn, or change in bowel habits   female: as above-has some mild pelvic pain  MUSCULOSKELETAL: NEGATIVE for significant arthralgias or myalgia  NEURO: NEGATIVE for weakness, dizziness or paresthesias  ENDOCRINE: NEGATIVE for temperature intolerance, skin/hair changes  HEME: NEGATIVE for bleeding problems  PSYCHIATRIC: NEGATIVE for changes in mood or affect    EXAM:   /84  Pulse 95  Temp 98.5  F (36.9  C)  Resp 14  Ht 5' 1\" (1.549 m)  Wt 155 lb (70.3 kg)  SpO2 99%  BMI 29.29 kg/m2    GENERAL APPEARANCE: healthy, alert and no distress     EYES: EOMI, PERRL     HENT: ear canals and TM's normal and nose and mouth without ulcers or lesions     NECK: no adenopathy, no asymmetry, masses, or scars and thyroid normal to palpation     RESP: lungs clear to auscultation - no rales, rhonchi or wheezes     CV: regular rates and rhythm, normal S1 S2, no S3 or S4 and no murmur, click or rub     ABDOMEN:  soft, nontender, no HSM or masses and bowel sounds normal     MS: extremities normal- no gross deformities noted, no evidence of inflammation in joints, FROM in all extremities.     SKIN: no suspicious lesions or rashes     NEURO: Normal strength and tone, sensory exam grossly normal, mentation intact and speech normal     PSYCH: mentation appears normal. and affect normal/bright     LYMPHATICS: No cervical adenopathy    DIAGNOSTICS:     Labs Resulted Today:   Results for orders placed or performed in visit on 04/02/18   US Pelvic Complete with Transvaginal    Narrative    ULTRASOUND PELVIC COMPLETE WITH TRANSVAGINAL IMAGING  4/2/2018 6:55 PM      HISTORY:  Left ovarian cyst.    FINDINGS:  Transvaginal images were performed to better evaluate the  patient's uterus, ovaries and endometrial stripe.    The uterus is surgically absent. The right ovary is only partially  visualized due to overlying bowel gas and measures approximately 2.6 x  2.0 x 3.0 cm. The left ovary measures 6.0 x 3.7 x 5.6 cm and " is nearly  completely replaced by a large complex cystic lesion without internal  color Doppler flow, again most likely a hemorrhagic cyst or  endometrioma. This previously measured 5.8 x 4.2 x 6.3 cm.  No adnexal  masses are present. No free pelvic fluid is present.      Impression    IMPRESSION: Limited exam due to bowel gas. Large complex cystic lesion  left ovary is unchanged compared to prior exam. No free pelvic fluid.  Prior hysterectomy.    JO LAMAS MD       Recent Labs   Lab Test  02/28/18   1824  10/25/17   1659  02/03/17   0732  03/16/15   0736  05/23/14   0804   05/11/13   0607  05/10/13   2013   HGB  13.1   --   13.5  12.8  14.0   < >  13.0   --    PLT   --    --    --    --    --    --   188  154   NA  141  143  146*  142  141   < >  139   --    POTASSIUM  4.1  4.4  4.3  4.1  4.0   < >  4.3   --    CR  0.59  0.69  0.62  0.57  0.57   < >  0.59  0.63   A1C   --    --    --   4.5  4.7   < >   --    --     < > = values in this interval not displayed.        IMPRESSION:   Reason for surgery/procedure: as above  Diagnosis/reason for consult: Preop    The proposed surgical procedure is considered INTERMEDIATE risk.    REVISED CARDIAC RISK INDEX  The patient has the following serious cardiovascular risks for perioperative complications such as (MI, PE, VFib and 3  AV Block):  No serious cardiac risks  INTERPRETATION: 0 risks: Class I (very low risk - 0.4% complication rate)    The patient has the following additional risks for perioperative complications:  No identified additional risks      ICD-10-CM    1. Preop general physical exam Z01.818 Hemoglobin   2. Tobacco use disorder F17.200    3. Osteopenia, unspecified location M85.80    4. Gastroesophageal reflux disease, esophagitis presence not specified K21.9        RECOMMENDATIONS:   APPROVAL GIVEN to proceed with proposed procedure.       Signed Electronically by: Yazmin Egan MD    Copy of this evaluation report is provided to requesting  physician.    [facility] Preop Guidelines    Revised Cardiac Risk Index

## 2018-05-15 NOTE — PATIENT INSTRUCTIONS
[facility]    If you have any questions regarding to your visit please contact your care team:       Team Red:   Clinic Hours Telephone Number   Dr. Ebonie Ruvalcaba  (pediatrics)  Rima Mccollum NP 7am-7pm  Monday - Thursday   7am-5pm  Fridays  [phone] (fax)    Gerda COSTELLO  [phone]   Urgent Care - [place] and [place] Monday-Friday  [place] - 11am-8pm  Saturday-Sunday  Both sites - 9am-5pm  [phone] - [facility]  [phone] - [place]       What options do I have for visits at the clinic other than the traditional office visit?  To expand how we care for you, many of our providers are utilizing electronic visits (e-visits) and telephone visits, when medically appropriate, for interactions with their patients rather than a visit in the clinic.   We also offer nurse visits for many medical concerns. Just like any other service, we will bill your insurance company for this type of visit based on time spent on the phone with your provider. Not all insurance companies cover these visits. Please check with your medical insurance if this type of visit is covered. You will be responsible for any charges that are not paid by your insurance.      E-visits via [employer]:  generally incur a $35.00 fee.  Telephone visits:  Time spent on the phone: *charged based on time that is spent on the phone in increments of 10 minutes. Estimated cost:   5-10 mins $30.00   11-20 mins. $59.00   21-30 mins. $85.00     As always, Thank you for trusting us with your health care needs!                Before Your Surgery      Call your surgeon if there is any change in your health. This includes signs of a cold or flu (such as a sore throat, runny nose, cough, rash or fever).    Do not smoke, drink alcohol or take over the counter medicine (unless your surgeon or primary care doctor tells you to) for the 24 hours before and after surgery.    If you take  prescribed drugs: Follow your doctor s orders about which medicines to take and which to stop until after surgery.    Eating and drinking prior to surgery: follow the instructions from your surgeon    Take a shower or bath the night before surgery. Use the soap your surgeon gave you to gently clean your skin. If you do not have soap from your surgeon, use your regular soap. Do not shave or scrub the surgery site.  Wear clean pajamas and have clean sheets on your bed.   Discharged By:    LESLEE BIRCH

## 2018-05-22 ENCOUNTER — DOCUMENTATION ONLY (OUTPATIENT)
Dept: LAB | Facility: CLINIC | Age: 43
End: 2018-05-22

## 2018-05-22 DIAGNOSIS — Z01.818 PRE-OP TESTING: Primary | ICD-10-CM

## 2018-05-22 NOTE — PROGRESS NOTES
Patient is scheduled for a Lab appointment on 5/29 for PRE-OP LABS.  Please place tests needed. If labs are not needed, please follow up with the patient.    Thank you,   Brianna Ortiz)

## 2018-05-24 DIAGNOSIS — N83.292 COMPLEX CYST OF LEFT OVARY: ICD-10-CM

## 2018-05-24 DIAGNOSIS — Z01.818 PRE-OP EXAM: Primary | ICD-10-CM

## 2018-05-29 DIAGNOSIS — Z01.818 PRE-OP EXAM: ICD-10-CM

## 2018-05-29 DIAGNOSIS — N83.292 COMPLEX CYST OF LEFT OVARY: ICD-10-CM

## 2018-05-29 DIAGNOSIS — Z01.818 PRE-OP TESTING: Primary | ICD-10-CM

## 2018-05-29 LAB
ABO + RH BLD: NORMAL
ABO + RH BLD: NORMAL
BLD GP AB SCN SERPL QL: NORMAL
BLOOD BANK CMNT PATIENT-IMP: NORMAL
ERYTHROCYTE [DISTWIDTH] IN BLOOD BY AUTOMATED COUNT: 12.7 % (ref 10–15)
HCT VFR BLD AUTO: 37.3 % (ref 35–47)
HGB BLD-MCNC: 12.7 G/DL (ref 11.7–15.7)
MCH RBC QN AUTO: 29.9 PG (ref 26.5–33)
MCHC RBC AUTO-ENTMCNC: 34 G/DL (ref 31.5–36.5)
MCV RBC AUTO: 88 FL (ref 78–100)
PLATELET # BLD AUTO: 189 10E9/L (ref 150–450)
RBC # BLD AUTO: 4.25 10E12/L (ref 3.8–5.2)
SPECIMEN EXP DATE BLD: NORMAL
WBC # BLD AUTO: 4.5 10E9/L (ref 4–11)

## 2018-05-29 PROCEDURE — 86900 BLOOD TYPING SEROLOGIC ABO: CPT | Performed by: OBSTETRICS & GYNECOLOGY

## 2018-05-29 PROCEDURE — 36415 COLL VENOUS BLD VENIPUNCTURE: CPT | Performed by: OBSTETRICS & GYNECOLOGY

## 2018-05-29 PROCEDURE — 85027 COMPLETE CBC AUTOMATED: CPT | Performed by: OBSTETRICS & GYNECOLOGY

## 2018-05-29 PROCEDURE — 86901 BLOOD TYPING SEROLOGIC RH(D): CPT | Performed by: OBSTETRICS & GYNECOLOGY

## 2018-05-29 PROCEDURE — 86850 RBC ANTIBODY SCREEN: CPT | Performed by: OBSTETRICS & GYNECOLOGY

## 2018-05-30 ENCOUNTER — ANESTHESIA EVENT (OUTPATIENT)
Dept: SURGERY | Facility: CLINIC | Age: 43
End: 2018-05-30
Payer: COMMERCIAL

## 2018-05-30 NOTE — ANESTHESIA PREPROCEDURE EVALUATION
Anesthesia Evaluation     . Pt has had prior anesthetic.     History of anesthetic complications   - PONV        ROS/MED HX    ENT/Pulmonary:  - neg pulmonary ROS     Neurologic:  - neg neurologic ROS     Cardiovascular:     (+) hypertension----. : . . . :. .       METS/Exercise Tolerance:  >4 METS   Hematologic:  - neg hematologic  ROS      (-) anemia   Musculoskeletal:   (+) , fracture lower extremity, -       GI/Hepatic: Comment: H/o gastric sleeve    (+) GERD       Renal/Genitourinary: Comment: Endometriosis, ovarian cyst        Endo:  - neg endo ROS       Psychiatric:     (+) psychiatric history depression and anxiety      Infectious Disease:  - neg infectious disease ROS       Malignancy:         Other: Comment: H/o hysterectomy   (+) No chance of pregnancy                    Physical Exam  Normal systems: cardiovascular, pulmonary and dental    Airway   Mallampati: III  TM distance: >3 FB  Neck ROM: full    Dental     Cardiovascular   Rhythm and rate: regular and normal  (-) no murmur    Pulmonary                     Anesthesia Plan      History & Physical Review  History and physical reviewed and following examination; no interval change.    ASA Status:  2 .    NPO Status:  > 8 hours    Plan for General and ETT with Intravenous induction. Maintenance will be Balanced.    PONV prophylaxis:  Ondansetron (or other 5HT-3), Dexamethasone or Solumedrol and Scopolamine patch  Discussed risks of anesthesia including nausea, vomiting, sore throat, dental damage, cardiopulmonary complications, neurologic complications, blood transfusion, and serious complications.    Discussed risks and benefits of epidural for pain control if procedure converts to open.        Postoperative Care  Postoperative pain management:  Multi-modal analgesia.      Consents  Anesthetic plan, risks, benefits and alternatives discussed with:  Patient.  Use of blood products discussed: Yes.   Use of blood products discussed with Patient.   Consented to blood products.  .                          .

## 2018-05-31 ENCOUNTER — HOSPITAL ENCOUNTER (OUTPATIENT)
Facility: CLINIC | Age: 43
Discharge: HOME | End: 2018-05-31
Attending: OBSTETRICS & GYNECOLOGY | Admitting: OBSTETRICS & GYNECOLOGY
Payer: COMMERCIAL

## 2018-05-31 ENCOUNTER — ANESTHESIA (OUTPATIENT)
Dept: SURGERY | Facility: CLINIC | Age: 43
End: 2018-05-31
Payer: COMMERCIAL

## 2018-05-31 ENCOUNTER — SURGERY (OUTPATIENT)
Age: 43
End: 2018-05-31

## 2018-05-31 VITALS
HEIGHT: 61 IN | SYSTOLIC BLOOD PRESSURE: 104 MMHG | BODY MASS INDEX: 29.09 KG/M2 | OXYGEN SATURATION: 96 % | DIASTOLIC BLOOD PRESSURE: 65 MMHG | WEIGHT: 154.1 LBS | TEMPERATURE: 98.4 F | RESPIRATION RATE: 16 BRPM

## 2018-05-31 DIAGNOSIS — Z98.890 S/P LAPAROSCOPY: Primary | ICD-10-CM

## 2018-05-31 LAB — GLUCOSE BLDC GLUCOMTR-MCNC: 85 MG/DL (ref 70–99)

## 2018-05-31 PROCEDURE — 37000008 ZZH ANESTHESIA TECHNICAL FEE, 1ST 30 MIN: Performed by: OBSTETRICS & GYNECOLOGY

## 2018-05-31 PROCEDURE — 58661 LAPAROSCOPY REMOVE ADNEXA: CPT | Mod: GC | Performed by: OBSTETRICS & GYNECOLOGY

## 2018-05-31 PROCEDURE — 25000128 H RX IP 250 OP 636: Performed by: NURSE ANESTHETIST, CERTIFIED REGISTERED

## 2018-05-31 PROCEDURE — 88307 TISSUE EXAM BY PATHOLOGIST: CPT | Performed by: OBSTETRICS & GYNECOLOGY

## 2018-05-31 PROCEDURE — 58661 LAPAROSCOPY REMOVE ADNEXA: CPT | Mod: 80 | Performed by: OBSTETRICS & GYNECOLOGY

## 2018-05-31 PROCEDURE — 25000128 H RX IP 250 OP 636: Performed by: ANESTHESIOLOGY

## 2018-05-31 PROCEDURE — 25000125 ZZHC RX 250: Performed by: NURSE ANESTHETIST, CERTIFIED REGISTERED

## 2018-05-31 PROCEDURE — 27210794 ZZH OR GENERAL SUPPLY STERILE: Performed by: OBSTETRICS & GYNECOLOGY

## 2018-05-31 PROCEDURE — 25000565 ZZH ISOFLURANE, EA 15 MIN: Performed by: OBSTETRICS & GYNECOLOGY

## 2018-05-31 PROCEDURE — 25000125 ZZHC RX 250: Performed by: ANESTHESIOLOGY

## 2018-05-31 PROCEDURE — 88307 TISSUE EXAM BY PATHOLOGIST: CPT | Mod: 26 | Performed by: OBSTETRICS & GYNECOLOGY

## 2018-05-31 PROCEDURE — 25000128 H RX IP 250 OP 636: Performed by: STUDENT IN AN ORGANIZED HEALTH CARE EDUCATION/TRAINING PROGRAM

## 2018-05-31 PROCEDURE — 25000132 ZZH RX MED GY IP 250 OP 250 PS 637: Performed by: STUDENT IN AN ORGANIZED HEALTH CARE EDUCATION/TRAINING PROGRAM

## 2018-05-31 PROCEDURE — 71000014 ZZH RECOVERY PHASE 1 LEVEL 2 FIRST HR: Performed by: OBSTETRICS & GYNECOLOGY

## 2018-05-31 PROCEDURE — 40000170 ZZH STATISTIC PRE-PROCEDURE ASSESSMENT II: Performed by: OBSTETRICS & GYNECOLOGY

## 2018-05-31 PROCEDURE — 71000015 ZZH RECOVERY PHASE 1 LEVEL 2 EA ADDTL HR: Performed by: OBSTETRICS & GYNECOLOGY

## 2018-05-31 PROCEDURE — C9290 INJ, BUPIVACAINE LIPOSOME: HCPCS | Performed by: ANESTHESIOLOGY

## 2018-05-31 PROCEDURE — 25000132 ZZH RX MED GY IP 250 OP 250 PS 637: Performed by: OBSTETRICS & GYNECOLOGY

## 2018-05-31 PROCEDURE — 71000027 ZZH RECOVERY PHASE 2 EACH 15 MINS: Performed by: OBSTETRICS & GYNECOLOGY

## 2018-05-31 PROCEDURE — 82962 GLUCOSE BLOOD TEST: CPT

## 2018-05-31 PROCEDURE — 36000059 ZZH SURGERY LEVEL 3 EA 15 ADDTL MIN UMMC: Performed by: OBSTETRICS & GYNECOLOGY

## 2018-05-31 PROCEDURE — 36000057 ZZH SURGERY LEVEL 3 1ST 30 MIN - UMMC: Performed by: OBSTETRICS & GYNECOLOGY

## 2018-05-31 PROCEDURE — 37000009 ZZH ANESTHESIA TECHNICAL FEE, EACH ADDTL 15 MIN: Performed by: OBSTETRICS & GYNECOLOGY

## 2018-05-31 PROCEDURE — C9399 UNCLASSIFIED DRUGS OR BIOLOG: HCPCS | Performed by: NURSE ANESTHETIST, CERTIFIED REGISTERED

## 2018-05-31 PROCEDURE — 25000125 ZZHC RX 250: Performed by: STUDENT IN AN ORGANIZED HEALTH CARE EDUCATION/TRAINING PROGRAM

## 2018-05-31 RX ORDER — LIDOCAINE 40 MG/G
CREAM TOPICAL
Status: DISCONTINUED | OUTPATIENT
Start: 2018-05-31 | End: 2018-05-31 | Stop reason: HOSPADM

## 2018-05-31 RX ORDER — HYDROMORPHONE HYDROCHLORIDE 1 MG/ML
.2-.4 INJECTION, SOLUTION INTRAMUSCULAR; INTRAVENOUS; SUBCUTANEOUS EVERY 10 MIN PRN
Status: DISCONTINUED | OUTPATIENT
Start: 2018-05-31 | End: 2018-05-31 | Stop reason: HOSPADM

## 2018-05-31 RX ORDER — OXYCODONE HYDROCHLORIDE 5 MG/1
5-10 TABLET ORAL EVERY 4 HOURS PRN
Qty: 6 TABLET | Refills: 0 | Status: SHIPPED | OUTPATIENT
Start: 2018-05-31 | End: 2018-11-07

## 2018-05-31 RX ORDER — PROPOFOL 10 MG/ML
INJECTION, EMULSION INTRAVENOUS CONTINUOUS PRN
Status: DISCONTINUED | OUTPATIENT
Start: 2018-05-31 | End: 2018-05-31

## 2018-05-31 RX ORDER — IBUPROFEN 600 MG/1
600 TABLET, FILM COATED ORAL
Status: DISCONTINUED | OUTPATIENT
Start: 2018-05-31 | End: 2018-05-31 | Stop reason: HOSPADM

## 2018-05-31 RX ORDER — ACETAMINOPHEN 325 MG/1
975 TABLET ORAL ONCE
Status: COMPLETED | OUTPATIENT
Start: 2018-05-31 | End: 2018-05-31

## 2018-05-31 RX ORDER — FENTANYL CITRATE 50 UG/ML
25-50 INJECTION, SOLUTION INTRAMUSCULAR; INTRAVENOUS EVERY 5 MIN PRN
Status: DISCONTINUED | OUTPATIENT
Start: 2018-05-31 | End: 2018-05-31 | Stop reason: HOSPADM

## 2018-05-31 RX ORDER — SODIUM CHLORIDE, SODIUM LACTATE, POTASSIUM CHLORIDE, CALCIUM CHLORIDE 600; 310; 30; 20 MG/100ML; MG/100ML; MG/100ML; MG/100ML
INJECTION, SOLUTION INTRAVENOUS CONTINUOUS PRN
Status: DISCONTINUED | OUTPATIENT
Start: 2018-05-31 | End: 2018-05-31

## 2018-05-31 RX ORDER — ONDANSETRON 2 MG/ML
INJECTION INTRAMUSCULAR; INTRAVENOUS PRN
Status: DISCONTINUED | OUTPATIENT
Start: 2018-05-31 | End: 2018-05-31

## 2018-05-31 RX ORDER — FENTANYL CITRATE 50 UG/ML
50 INJECTION, SOLUTION INTRAMUSCULAR; INTRAVENOUS EVERY 5 MIN PRN
Status: DISCONTINUED | OUTPATIENT
Start: 2018-05-31 | End: 2018-05-31 | Stop reason: HOSPADM

## 2018-05-31 RX ORDER — ONDANSETRON 4 MG/1
4 TABLET, ORALLY DISINTEGRATING ORAL EVERY 30 MIN PRN
Status: DISCONTINUED | OUTPATIENT
Start: 2018-05-31 | End: 2018-05-31 | Stop reason: HOSPADM

## 2018-05-31 RX ORDER — ONDANSETRON 2 MG/ML
4 INJECTION INTRAMUSCULAR; INTRAVENOUS EVERY 30 MIN PRN
Status: DISCONTINUED | OUTPATIENT
Start: 2018-05-31 | End: 2018-05-31 | Stop reason: HOSPADM

## 2018-05-31 RX ORDER — SODIUM CHLORIDE, SODIUM LACTATE, POTASSIUM CHLORIDE, CALCIUM CHLORIDE 600; 310; 30; 20 MG/100ML; MG/100ML; MG/100ML; MG/100ML
INJECTION, SOLUTION INTRAVENOUS CONTINUOUS
Status: DISCONTINUED | OUTPATIENT
Start: 2018-05-31 | End: 2018-05-31 | Stop reason: HOSPADM

## 2018-05-31 RX ORDER — PROPOFOL 10 MG/ML
INJECTION, EMULSION INTRAVENOUS PRN
Status: DISCONTINUED | OUTPATIENT
Start: 2018-05-31 | End: 2018-05-31

## 2018-05-31 RX ORDER — FENTANYL CITRATE 50 UG/ML
INJECTION, SOLUTION INTRAMUSCULAR; INTRAVENOUS PRN
Status: DISCONTINUED | OUTPATIENT
Start: 2018-05-31 | End: 2018-05-31

## 2018-05-31 RX ORDER — PROMETHAZINE HYDROCHLORIDE 25 MG/ML
12.5 INJECTION, SOLUTION INTRAMUSCULAR; INTRAVENOUS
Status: DISCONTINUED | OUTPATIENT
Start: 2018-05-31 | End: 2018-05-31 | Stop reason: HOSPADM

## 2018-05-31 RX ORDER — MEPERIDINE HYDROCHLORIDE 25 MG/ML
12.5 INJECTION INTRAMUSCULAR; INTRAVENOUS; SUBCUTANEOUS
Status: DISCONTINUED | OUTPATIENT
Start: 2018-05-31 | End: 2018-05-31 | Stop reason: HOSPADM

## 2018-05-31 RX ORDER — OXYCODONE AND ACETAMINOPHEN 5; 325 MG/1; MG/1
1-2 TABLET ORAL
Status: DISCONTINUED | OUTPATIENT
Start: 2018-05-31 | End: 2018-05-31 | Stop reason: HOSPADM

## 2018-05-31 RX ORDER — OXYCODONE HYDROCHLORIDE 5 MG/1
5 TABLET ORAL EVERY 4 HOURS PRN
Status: DISCONTINUED | OUTPATIENT
Start: 2018-05-31 | End: 2018-05-31 | Stop reason: HOSPADM

## 2018-05-31 RX ORDER — PHENAZOPYRIDINE HYDROCHLORIDE 200 MG/1
200 TABLET, FILM COATED ORAL ONCE
Status: COMPLETED | OUTPATIENT
Start: 2018-05-31 | End: 2018-05-31

## 2018-05-31 RX ORDER — SCOLOPAMINE TRANSDERMAL SYSTEM 1 MG/1
1 PATCH, EXTENDED RELEASE TRANSDERMAL ONCE
Status: COMPLETED | OUTPATIENT
Start: 2018-05-31 | End: 2018-05-31

## 2018-05-31 RX ORDER — ACETAMINOPHEN 325 MG/1
325-650 TABLET ORAL EVERY 4 HOURS PRN
Qty: 100 TABLET | Refills: 0 | Status: SHIPPED | OUTPATIENT
Start: 2018-05-31 | End: 2018-11-07

## 2018-05-31 RX ORDER — NALOXONE HYDROCHLORIDE 0.4 MG/ML
.1-.4 INJECTION, SOLUTION INTRAMUSCULAR; INTRAVENOUS; SUBCUTANEOUS
Status: DISCONTINUED | OUTPATIENT
Start: 2018-05-31 | End: 2018-05-31 | Stop reason: HOSPADM

## 2018-05-31 RX ORDER — ONDANSETRON 4 MG/1
4-8 TABLET, ORALLY DISINTEGRATING ORAL EVERY 8 HOURS PRN
Qty: 4 TABLET | Refills: 0 | Status: SHIPPED | OUTPATIENT
Start: 2018-05-31 | End: 2018-11-07

## 2018-05-31 RX ORDER — CALCIUM CARBONATE 500 MG/1
2 TABLET, CHEWABLE ORAL 2 TIMES DAILY PRN
COMMUNITY

## 2018-05-31 RX ORDER — BUPIVACAINE HYDROCHLORIDE AND EPINEPHRINE 5; 5 MG/ML; UG/ML
INJECTION, SOLUTION PERINEURAL PRN
Status: DISCONTINUED | OUTPATIENT
Start: 2018-05-31 | End: 2018-05-31

## 2018-05-31 RX ORDER — DEXAMETHASONE SODIUM PHOSPHATE 4 MG/ML
INJECTION, SOLUTION INTRA-ARTICULAR; INTRALESIONAL; INTRAMUSCULAR; INTRAVENOUS; SOFT TISSUE PRN
Status: DISCONTINUED | OUTPATIENT
Start: 2018-05-31 | End: 2018-05-31

## 2018-05-31 RX ORDER — IBUPROFEN 600 MG/1
600 TABLET, FILM COATED ORAL EVERY 6 HOURS PRN
Qty: 30 TABLET | Refills: 0 | Status: SHIPPED | OUTPATIENT
Start: 2018-05-31 | End: 2018-11-07

## 2018-05-31 RX ADMIN — MIDAZOLAM HYDROCHLORIDE 1 MG: 1 INJECTION, SOLUTION INTRAMUSCULAR; INTRAVENOUS at 07:24

## 2018-05-31 RX ADMIN — FENTANYL CITRATE 75 MCG: 50 INJECTION, SOLUTION INTRAMUSCULAR; INTRAVENOUS at 07:38

## 2018-05-31 RX ADMIN — BUPIVACAINE 20 ML: 13.3 INJECTION, SUSPENSION, LIPOSOMAL INFILTRATION at 07:26

## 2018-05-31 RX ADMIN — DEXAMETHASONE SODIUM PHOSPHATE 6 MG: 4 INJECTION, SOLUTION INTRAMUSCULAR; INTRAVENOUS at 07:40

## 2018-05-31 RX ADMIN — HYDROMORPHONE HYDROCHLORIDE 0.3 MG: 1 INJECTION, SOLUTION INTRAMUSCULAR; INTRAVENOUS; SUBCUTANEOUS at 10:07

## 2018-05-31 RX ADMIN — PROPOFOL 40 MG: 10 INJECTION, EMULSION INTRAVENOUS at 07:41

## 2018-05-31 RX ADMIN — ACETAMINOPHEN 975 MG: 325 TABLET, FILM COATED ORAL at 06:12

## 2018-05-31 RX ADMIN — PROPOFOL 110 MG: 10 INJECTION, EMULSION INTRAVENOUS at 07:39

## 2018-05-31 RX ADMIN — ONDANSETRON 4 MG: 2 INJECTION INTRAMUSCULAR; INTRAVENOUS at 09:18

## 2018-05-31 RX ADMIN — PHENAZOPYRIDINE HYDROCHLORIDE 200 MG: 200 TABLET, COATED ORAL at 06:13

## 2018-05-31 RX ADMIN — SCOPALAMINE 1 PATCH: 1 PATCH, EXTENDED RELEASE TRANSDERMAL at 06:13

## 2018-05-31 RX ADMIN — ROCURONIUM BROMIDE 50 MG: 10 INJECTION INTRAVENOUS at 07:40

## 2018-05-31 RX ADMIN — SODIUM CHLORIDE, POTASSIUM CHLORIDE, SODIUM LACTATE AND CALCIUM CHLORIDE: 600; 310; 30; 20 INJECTION, SOLUTION INTRAVENOUS at 07:28

## 2018-05-31 RX ADMIN — PROPOFOL 200 MCG/KG/MIN: 10 INJECTION, EMULSION INTRAVENOUS at 07:40

## 2018-05-31 RX ADMIN — OXYCODONE HYDROCHLORIDE 5 MG: 5 TABLET ORAL at 10:48

## 2018-05-31 RX ADMIN — ROCURONIUM BROMIDE 20 MG: 10 INJECTION INTRAVENOUS at 08:19

## 2018-05-31 RX ADMIN — FENTANYL CITRATE 50 MCG: 50 INJECTION, SOLUTION INTRAMUSCULAR; INTRAVENOUS at 07:24

## 2018-05-31 RX ADMIN — SODIUM CHLORIDE, POTASSIUM CHLORIDE, SODIUM LACTATE AND CALCIUM CHLORIDE: 600; 310; 30; 20 INJECTION, SOLUTION INTRAVENOUS at 09:31

## 2018-05-31 RX ADMIN — FENTANYL CITRATE 50 MCG: 50 INJECTION, SOLUTION INTRAMUSCULAR; INTRAVENOUS at 08:36

## 2018-05-31 RX ADMIN — FENTANYL CITRATE 25 MCG: 50 INJECTION INTRAMUSCULAR; INTRAVENOUS at 10:03

## 2018-05-31 RX ADMIN — SUGAMMADEX 150 MG: 100 INJECTION, SOLUTION INTRAVENOUS at 09:18

## 2018-05-31 RX ADMIN — BUPIVACAINE HYDROCHLORIDE AND EPINEPHRINE BITARTRATE 20 ML: 5; .005 INJECTION, SOLUTION EPIDURAL; INTRACAUDAL; PERINEURAL at 07:25

## 2018-05-31 RX ADMIN — HYDROMORPHONE HYDROCHLORIDE 0.2 MG: 1 INJECTION, SOLUTION INTRAMUSCULAR; INTRAVENOUS; SUBCUTANEOUS at 10:24

## 2018-05-31 RX ADMIN — FENTANYL CITRATE 25 MCG: 50 INJECTION INTRAMUSCULAR; INTRAVENOUS at 10:00

## 2018-05-31 NOTE — IP AVS SNAPSHOT
MRN:[id number]                      After Visit Summary   5/31/2018    Yvonne Zarco    MRN: [id number]           Thank you!     Thank you for choosing [facility] for your care. Our goal is always to provide you with excellent care. Hearing back from our patients is one way we can continue to improve our services. Please take a few minutes to complete the written survey that you may receive in the mail after you visit with us. Thank you!        Patient Information     Date Of Birth          1975        About your hospital stay     You were admitted on:  May 31, 2018 You last received care in the:   PACU    You were discharged on:  May 31, 2018       Who to Call     For medical emergencies, please call 911.  For non-urgent questions about your medical care, please call your primary care provider or clinic, [phone]  For questions related to your surgery, please call your surgery clinic        Attending Provider     Provider Maritza Moore MD OB/Gyn       Primary Care Provider Office Phone # Fax #    Rima ISH Reyes [facility] [phone]      After Care Instructions     Discharge Instructions       Resume pre procedure diet            Discharge Instructions       Patient to arrange follow up appointment in 2-4  Weeks with Dr. Fowler            Dressing       Keep dressing clean and dry            Ice to affected area       PRN as tolerated            No alcohol       NO ALCOHOL for 24 hours post procedure            No driving or operating machinery       No driving or operating machinery until day after procedure. No driving while on narcotic pain medication            No lifting       No lifting over 15 pounds and no strenuous physical activity.  For 6 weeks            Shower        It is okay to shower.                  Further instructions from your care team       Same-Day Surgery   Adult Discharge Orders & Instructions     For 24 hours after  surgery:  1. Get plenty of rest.  A responsible adult must stay with you for at least 24 hours after you leave the hospital.   2. Pain medication can slow your reflexes. Do not drive or use heavy equipment.  If you have weakness or tingling, don't drive or use heavy equipment until this feeling goes away.  3. Mixing alcohol and pain medication can cause dizziness and slow your breathing. It can even be fatal. Do not drink alcohol while taking pain medication.  4. Avoid strenuous or risky activities.  Ask for help when climbing stairs.   5. You may feel lightheaded.  If so, sit for a few minutes before standing.  Have someone help you get up.   6. If you have nausea (feel sick to your stomach), drink only clear liquids such as apple juice, ginger ale, broth or 7-Up.  Rest may also help.  Be sure to drink enough fluids.  Move to a regular diet as you feel able. Take pain medications with a small amount of solid food, such as toast or crackers, to avoid nausea.   7. A slight fever is normal. Call the doctor if your fever is over 100 F (37.7 C) (taken under the tongue) or lasts longer than 24 hours.  8. You may have a dry mouth, muscle aches, trouble sleeping or a sore throat.  These symptoms should go away after 24 hours.  9. Do not make important or legal decisions.   Pain Management:      1. Take pain medication (if prescribed) for pain as directed by your physician.        2. WARNING: If the pain medication you have been prescribed contains Tylenol  (acetaminophen), DO NOT take additional doses of Tylenol (acetaminophen).     Call your doctor for any of the followin.  Signs of infection (fever, growing tenderness at the surgery site, severe pain, a large amount of drainage or bleeding, foul-smelling drainage, redness, swelling).    2.  It has been over 8 to 10 hours since surgery and you are still not able to urinate (pee).    3.  Headache for over 24 hours.    4.  Numbness, tingling or weakness the day after  surgery (if you had spinal anesthesia).  To contact a doctor, call _____________________________________ or:      [phone] and ask for the Resident On Call for:          __________________________________________ (answered 24 hours a day)      Emergency Department:  [facility]: [phone]  [facility]: [phone]               Rev. 10/2014   Scopolamine Patch  (Absorbed through the skin)    The Scopolamine Patch prevents nausea and vomiting caused by motion sickness or anesthesia and surgery in adults.    Brand Name(s): Transderm Scop, Transderm-Scope  There may be other brand names for this medicine.    When This Medicine Should Not Be Used:  You should not use this medicine if you have had an allergic reaction to scopolamine, or if you have narrow angle glaucoma.    How to Use This Medicine:    Your doctor will tell you how many patches to use, where to apply them, and how often to apply them.     Do not use more patches or apply them more often than your doctor tells you to.    This medicine comes with patient instructions. Read and follow these instructions carefully. Ask your doctor or pharmacist if you have any questions.    To prevent motion sickness, apply the patch at least 4 hours before you need it.    Wash and dry your hands thoroughly before applying the patch.    Leave the patch in its sealed wrapper until you are ready to put it on. Tear the wrapper open carefully. NEVER CUT the wrapper or the patch with scissors. Do not use any patch that has been cut by accident.    Take the liner off the sticky side before applying.    Apply the patch to dry, hairless skin behind the ear.    If the patch is loose or falls off, apply a new patch at a different place behind the ear.    After you take off the patch, wash the place where the patch was and your hands thoroughly.    Only one patch should be used at any time.    If a dose is missed:  If you forget to wear or  change a patch, put one on as soon as you can. If it is almost time to put on your next patch, wait until then to apply a new patch and skip the one you missed. Do not apply extra patches to make up for a missed dose.    How to Store and Dispose of This Medicine:    Store the patches at room temperature in a closed container, away from heat, moisture and direct light.    Fold the used patch in half with the sticky sides together. Throw any used patch away so that children or pets cannot get to it. You will also need to throw away old patches after the expiration date has passed.    Keep all medicine away from children and never share your medicine with anyone.    Ask your doctor or pharmacist before using any other medicine, including over-the-counter medicines, vitamins, and herbal products.  Tell your doctor if you are using any medicines that make you sleepy. These include sleeping pills, cold and allergy medicine, narcotic pain relievers and sedatives.     Tell your doctor if you are using any medicine that make you sleepy. These include sleeping pills, yue and allergy medicine, narcotic pain relievers and sedatives.    Do not drink alcohol while you are using this medicine.     Warnings While Using This Medicine:    Make sure your doctor knows if you are pregnant or breastfeeding or if you have glaucoma, prostate problems, trouble urinating, blocked bowels, liver disease, kidney disease or a history of seizures or mental illness.    This medicine can cause blurring of vision and other vision problems if it comes in contact with the eyes. This medicine may also cause problems with urination. If any of these reactions occur, remove the patch and call your doctor right away.    This medicine may make you dizzy or drowsy. Avoid driving, using machines, or doing anything else that could be dangerous if you are not alert. If you plan to participate in underwater sports, this medicine may cause disorienting effects.  If this is a concern for you, talk with your doctor.    This medicine may make you sweat less and cause your body to get too hot. Be careful in hot weather, when you are exercising or if using sauna or whirlpool.    Make sure any doctor or dentist who treats you knows that you are using this medicine. This medicine may affect the results of certain medical tests.    Skin burns have been reported at the patch site in several patients wearing an aluminized transdermal system during a magnetic resonance imaging scan (MRI). Because Transderm Scop contains aluminum, it is recommended to remove the system before undergoing an MRI.    Call your doctor right away if you notice any of these side effects:    Allergic reaction: Itching or hives, swelling in your face or hands, swelling or tingling in your mouth or throat, chest tightness, trouble breathing    Blurred vision    Confusion or memory loss    Fast, slow or uneven heartbeat    Lightheadedness, dizziness, drowsiness or fainting    Seeing, hearing or feeling things that are not there    Severe eye pain    Trouble urinating    If you notice these less serious side effects, talk with your doctor:    Dry mouth    Dry, itchy or red eyes    Restlessness    Skin rash or redness    If you notice other side effects that you think are caused by this medicine, tell your doctor immediately.    Rev. 4/2014    Discharge Instructions:   Following a Laparoscopy    Comfort:    The amount of discomfort you can expect is very unpredictable.     If you have pain that cannot be controlled with non aspirin medication or with the prescription medication you may have received, you should notify your physician.     You May Experience:    Abdominal tenderness; abdominal cramps (like menstrual cramps).    Low back ache or discomfort radiating to your shoulders, chest, back or neck. This is a result of the gas used to inflate your abdomen during surgery. This gas is absorbed in 24 to 36 hours.  "The \"knee chest\" position will help relieve this discomfort.    Sore throat for a day or two resulting from the anesthesia tube used during surgery. You may use throat lozenges to help relieve this discomfort.    Black and blue marks on your abdomen.    Drainage:    You may expect a small amount of drainage from the incision on your abdomen and you may change the bandage when necessary.    You may also have a small amount of vaginal drainage for 3 to 4 days; this is normal and no cause for concern. If excessive bleeding occurs, notify your physician.    Do not douche, and use a pad rather than tampons. Do not resume intercourse for at least one week or until bleeding has ceased.    Home Activity:    The day of surgery spend a quiet day at home.    Increase activity as tolerated.    You may bathe or shower, do not soak in bath tub or scrub incisions.    You have no restrictions on your diet. Following surgery, drink plenty of fluids and eat a light meal.    The anesthesia may produce some nausea. If you feel nauseated, stay in bed, keep your head down and try drinking fluids such as Seven-Up, tea or soup.    Notify Physician at Once IF:    You have a fever over 100 degrees. A low grade fever (under 100 degrees) is usual after surgery.    You have severe pain.    You have a large amount of bleeding or drainage.    Rev. 4/2014  .[facility]    Additional Information     If you use hormonal birth control (such as the pill, patch, ring or implants): You'll need a second form of birth control for 7 days (condoms, a diaphragm or contraceptive foam). While in the hospital, you received a medicine called Bridion. Your normal birth control will not work as well for a week after taking this medicine.          Pending Results     Date and Time Order Name Status Description    5/31/2018 0903 Surgical pathology exam In process             Admission Information     Date & Time Provider Department Dept. Phone    5/31/2018 Maritza Fowler " "[facility]  P[facility] [phone]      Your Vitals Were     Blood Pressure Temperature Respirations Height Weight Pulse Oximetry    116/71 98.2  F (36.8  C) 12 1.549 m (5' 1\") 69.9 kg (154 lb 1.6 oz) 100%    BMI (Body Mass Index)                   29.12 kg/m2           MyChart Information     [employer] gives you secure access to your electronic health record. If you see a primary care provider, you can also send messages to your care team and make appointments. If you have questions, please call your primary care clinic.  If you do not have a primary care provider, please call [phone] and they will assist you.        Care EveryWhere ID     This is your Care EveryWhere ID. This could be used by other organizations to access your [facility] medical records  [id number]        Equal Access to Services     JAGDISH QUEZADA : David Ferrer, laura garay, sujrit simon. So [facility] [phone].    ATENCIÓN: Si habla español, tiene a javier disposición servicios gratuitos de asistencia lingüística. Masha al [phone].    We comply with applicable federal civil rights laws and [place] laws. We do not discriminate on the basis of race, color, national origin, age, disability, sex, sexual orientation, or gender identity.               Review of your medicines      START taking        Dose / Directions    acetaminophen 325 MG tablet   Commonly known as:  TYLENOL   Used for:  S/P laparoscopy        Dose:  325-650 mg   Take 1-2 tablets (325-650 mg) by mouth every 4 hours as needed for other (mild pain)   Quantity:  100 tablet   Refills:  0       ibuprofen 600 MG tablet   Commonly known as:  ADVIL/MOTRIN   Used for:  S/P laparoscopy        Dose:  600 mg   Take 1 tablet (600 mg) by mouth every 6 hours as needed for pain (mild)   Quantity:  30 tablet   Refills:  0       ondansetron 4 MG ODT tab   Commonly known as:  ZOFRAN-ODT   Used for:  S/P laparoscopy        " Dose:  4-8 mg   Take 1-2 tablets (4-8 mg) by mouth every 8 hours as needed for nausea Dissolve ON the tongue.   Quantity:  4 tablet   Refills:  0       oxyCODONE IR 5 MG tablet   Commonly known as:  ROXICODONE   Used for:  S/P laparoscopy        Dose:  5-10 mg   Take 1-2 tablets (5-10 mg) by mouth every 4 hours as needed for pain or other (Moderate to Severe)   Quantity:  6 tablet   Refills:  0         CONTINUE these medicines which have NOT CHANGED        Dose / Directions    alendronate 70 MG tablet   Commonly known as:  FOSAMAX   Used for:  Bariatric surgery status, Osteopenia, unspecified location        Take 1 tablet (70 mg) by mouth with 8oz water every 7 days 30 minutes before breakfast and remain upright during this time.   Quantity:  12 tablet   Refills:  3       aspirin-acetaminophen-caffeine 250-250-65 MG per tablet   Commonly known as:  EXCEDRIN MIGRAINE        Dose:  1 tablet   Take 1 tablet by mouth every 6 hours as needed for headaches   Refills:  0       benzoyl peroxide 5 % Liqd   Used for:  Acne        Apply to entire face and then wash off after 5 min daily. Then apply topical clindamycin. May bleach towels.   Quantity:  120 mL   Refills:  11       buPROPion 150 MG 12 hr tablet   Commonly known as:  WELLBUTRIN SR   Used for:  Tobacco use disorder        Take 1 tablet once daily and increase to 1 tablet twice daily after 4 to 7 days   Quantity:  60 tablet   Refills:  2       calcium carbonate 500 MG chewable tablet   Commonly known as:  TUMS   Indication:  Acid Indigestion        Dose:  2 chew tab   Take 2 chew tab by mouth 2 times daily as needed for heartburn   Refills:  0       calcium Citrate-vitamin D 500-400 MG-UNIT Chew   Used for:  Unspecified intestinal malabsorption, Status post bariatric surgery        Dose:  500 mg   Take 500 mg by mouth 2 times daily   Quantity:  60 tablet   Refills:  prn       DAILY MULTIVITAMIN PO   Used for:  Bariatric surgery status        Take  by mouth. Take 2  "chewable tablets daily   Refills:  0       esomeprazole 20 MG CR capsule   Commonly known as:  nexIUM   Used for:  Gastroesophageal reflux disease, esophagitis presence not specified        Dose:  20 mg   Take 1 capsule (20 mg) by mouth 2 times daily Take 30-60 minutes before eating.   Quantity:  84 capsule   Refills:  8       mometasone 50 MCG/ACT spray   Commonly known as:  NASONEX        Dose:  2 spray   Spray 2 sprays into both nostrils daily   Refills:  0       order for DME   Used for:  Acute left-sided low back pain with left-sided sciatica        Equipment being ordered: crutches   Quantity:  1 each   Refills:  0       PARoxetine 40 MG tablet   Commonly known as:  PAXIL   Used for:  Anxiety        Dose:  20 mg   Take 0.5 tablets (20 mg) by mouth At Bedtime   Quantity:  90 tablet   Refills:  3       UNJURY Powd   Used for:  Bariatric surgery status        Dose:  21 g   Take 21 g by mouth daily Unjury 21 gram-100 kcal/27 gram Powder   Quantity:  2 Bottle   Refills:  11       VITAMIN B 12 PO        Dose:  500 mcg   Take 500 mcg by mouth daily   Refills:  0            Where to get your medicines      These medications were sent to [facility] [address]  [address] [address]     Phone:  [phone]     acetaminophen 325 MG tablet    ibuprofen 600 MG tablet    ondansetron 4 MG ODT tab         Some of these will need a paper prescription and others can be bought over the counter. Ask your nurse if you have questions.     Bring a paper prescription for each of these medications     oxyCODONE IR 5 MG tablet                Protect others around you: Learn how to safely use, store and throw away your medicines at www.disposemymeds.org.        Information about your nerve block     Today you received a block to numb the nerves near your surgery site.    This is a block using local anesthetic or \"numbing\" medication injected around the nerves to " "anesthetize or \"numb\" the area supplied by those nerves. This block is injected into the muscle layer near your surgical site. The type of anesthesia (Exparel) your anesthesia team used to numb your abdomen may give you relief for up to 72 hours.     Diet: There are no diet restrictions, but you should drink plenty of fluids, unless you are on a fluid-restricted diet.     Activity: If your surgical site is an arm or leg you should be careful with your affected limb, since it is possible to injure your limb without being aware of it due to the numbing. Until full feeling returns, you should guard against bumping or hitting your limb, and avoid extreme hot or cold temperatures on the skin.    Pain Medication: As the block wears off, the feeling will return as a tingling or prickly sensation near your surgical site. You will experience more discomfort from your incisions as the feeling returns. You may want to take a pain pill (a narcotic or Tylenol if this was prescribed by your surgeon) when you start to experience mild pain, before the pain becomes more severe. If your pain medications do not control your pain, you should notify your surgeon. If you are taking narcotics for pain management, do not drink alcohol, drive a car, or perform hazardous activities.  If you have questions or concerns you may call your surgeon at the number provided with your discharge instructions.     Call your surgeon if you experience blurry vision, ringing in the ears or metallic taste in your mouth.         Information about OPIOIDS     PRESCRIPTION OPIOIDS: WHAT YOU NEED TO KNOW   You have a prescription for an opioid (narcotic) pain medicine. Opioids can cause addiction. If you have a history of chemical dependency of any type, you are at a higher risk of becoming addicted to opioids. Only take this medicine after all other options have been tried. Take it for as short a time and as few doses as possible.     Do not:    Drive. If you " drive while taking these medicines, you could be arrested for driving under the influence (DUI).    Operate heavy machinery    Do any other dangerous activities while taking these medicines.     Drink any alcohol while taking these medicines.      Take with any other medicines that contain acetaminophen. Read all labels carefully. Look for the word  acetaminophen  or  Tylenol.  Ask your pharmacist if you have questions or are unsure.    Store your pills in a secure place, locked if possible. We will not replace any lost or stolen medicine. If you don t finish your medicine, please throw away (dispose) as directed by your pharmacist. The [place] [employer]ution [employer] has more information about safe disposal: https://www.pca.[facility].[place].us/living-green/managing-unwanted-medications    All opioids tend to cause constipation. Drink plenty of water and eat foods that have a lot of fiber, such as fruits, vegetables, prune juice, apple juice and high-fiber cereal. Take a laxative (Miralax, milk of magnesia, Colace, Senna) if you don t move your bowels at least every other day.              Medication List: This is a list of all your medications and when to take them. Check marks below indicate your daily home schedule. Keep this list as a reference.      Medications           Morning Afternoon Evening Bedtime As Needed    acetaminophen 325 MG tablet   Commonly known as:  TYLENOL   Take 1-2 tablets (325-650 mg) by mouth every 4 hours as needed for other (mild pain)   Last time this was given:  975 mg on 5/31/2018  6:12 AM                                alendronate 70 MG tablet   Commonly known as:  FOSAMAX   Take 1 tablet (70 mg) by mouth with 8oz water every 7 days 30 minutes before breakfast and remain upright during this time.                                aspirin-acetaminophen-caffeine 250-250-65 MG per tablet   Commonly known as:  EXCEDRIN MIGRAINE   Take 1 tablet by mouth every 6 hours as needed for headaches                                 benzoyl peroxide 5 % Liqd   Apply to entire face and then wash off after 5 min daily. Then apply topical clindamycin. May bleach towels.                                buPROPion 150 MG 12 hr tablet   Commonly known as:  WELLBUTRIN SR   Take 1 tablet once daily and increase to 1 tablet twice daily after 4 to 7 days                                calcium carbonate 500 MG chewable tablet   Commonly known as:  TUMS   Take 2 chew tab by mouth 2 times daily as needed for heartburn                                calcium Citrate-vitamin D 500-400 MG-UNIT Chew   Take 500 mg by mouth 2 times daily                                DAILY MULTIVITAMIN PO   Take  by mouth. Take 2 chewable tablets daily                                esomeprazole 20 MG CR capsule   Commonly known as:  nexIUM   Take 1 capsule (20 mg) by mouth 2 times daily Take 30-60 minutes before eating.                                ibuprofen 600 MG tablet   Commonly known as:  ADVIL/MOTRIN   Take 1 tablet (600 mg) by mouth every 6 hours as needed for pain (mild)                                mometasone 50 MCG/ACT spray   Commonly known as:  NASONEX   Spray 2 sprays into both nostrils daily                                ondansetron 4 MG ODT tab   Commonly known as:  ZOFRAN-ODT   Take 1-2 tablets (4-8 mg) by mouth every 8 hours as needed for nausea Dissolve ON the tongue.                                order for DME   Equipment being ordered: crutches                                oxyCODONE IR 5 MG tablet   Commonly known as:  ROXICODONE   Take 1-2 tablets (5-10 mg) by mouth every 4 hours as needed for pain or other (Moderate to Severe)                                PARoxetine 40 MG tablet   Commonly known as:  PAXIL   Take 0.5 tablets (20 mg) by mouth At Bedtime                                UNJURY Powd   Take 21 g by mouth daily Unjury 21 gram-100 kcal/27 gram Powder                                VITAMIN B 12 PO   Take 500 mcg by  mouth daily

## 2018-05-31 NOTE — IP AVS SNAPSHOT
[facility]    [address] [facility] [address]    Phone:  [phone]                                       After Visit Summary   5/31/2018    Yvonne Zarco    MRN: [id number]           After Visit Summary Signature Page     I have received my discharge instructions, and my questions have been answered. I have discussed any challenges I see with this plan with the nurse or doctor.    ..........................................................................................................................................  Patient/Patient Representative Signature      ..........................................................................................................................................  Patient Representative Print Name and Relationship to Patient    ..................................................               ................................................  Date                                            Time    ..........................................................................................................................................  Reviewed by Signature/Title    ...................................................              ..............................................  Date                                                            Time

## 2018-05-31 NOTE — ANESTHESIA CARE TRANSFER NOTE
Patient: Yvonne VALLE Carolee    Procedure(s):  Adhesiolysis and Left Laparoscopic Oophorectomy - Wound Class: I-Clean    Diagnosis: Complex Left Ovarian Cyst   Diagnosis Additional Information: No value filed.    Anesthesia Type:   General, ETT     Note:  Airway :Face Mask  Patient transferred to:PACU  Handoff Report: Identifed the Patient, Identified the Reponsible Provider, Reviewed the pertinent medical history, Discussed the surgical course, Reviewed Intra-OP anesthesia mangement and issues during anesthesia, Set expectations for post-procedure period and Allowed opportunity for questions and acknowledgement of understanding      Vitals: (Last set prior to Anesthesia Care Transfer)    CRNA VITALS  5/31/2018 0903 - 5/31/2018 0939      5/31/2018             Pulse: 87    SpO2: 100 %                Electronically Signed By: ISH Simmons CRNA  May 31, 2018  9:39 AM

## 2018-05-31 NOTE — OR NURSING
Alert oriented. Pain is controlled with pain pill. Taking fluids without nausea. Lap sites are dry and intact. Reviewed discharge instructions with  and family. Report given to Keke Davenport RN

## 2018-05-31 NOTE — BRIEF OP NOTE
BRIEF OPERATIVE NOTE    Procedure date: 5/31/2018    Pre-procedure Dx:   Complex Left Ovarian Cyst     Post- Procedure Dx:   Same s/p procedure    Procedure:   Laparoscopic left oophorectomy  Lysis of adhesions    Surgeon: Dr. Janeth Boone    Assistant(s): Jessie Linares, PGY-4            Soy Small, G4    Anesthesia: General Endotracheal Anesthesia and TAPS blocks  EBL: 10 mL  UOP: 200 mL  IVF: 900 mL    Complications: None   Findings: On laparoscopy, entry point free from injury. Omentum adhered to the anterior abdominal wall taken down without difficulty. Uterus surgically absent. Normal appearing right ovary and fallopian tube. Normal appearing left fallopian tube. Left ovary fully encompassed with smooth ~6cm cyst. Left ureter seen vermiculating in the pelvis well away from surgical site. Left ovary and cyst removed and placed on bag, which was brought up to the abdominal wall and had control rupture of chocolate brown colored cyst fluid without any spillage into the abdomen. Hemostatic at end of case.     Specimens: Left ovary    Attending was scrubbed and present for the entire procedure.    Jessie Linares MD   OBGYN, PGY-4  5/31/2018 9:16 AM

## 2018-05-31 NOTE — OP NOTE
OPERATIVE NOTE    Procedure date: 5/31/2018    Pre-procedure Dx:   Complex Left Ovarian Cyst     Post- Procedure Dx:   Same s/p procedure    Procedure:   Laparoscopic left oophorectomy, Lysis of adhesions    Surgeon: Dr. Janeth Boone    Assistant(s): Jessie Linares, PGY-4            Soy Viverosblaynenidhi, G4    Anesthesia: General Endotracheal Anesthesia and TAPS blocks  EBL: 10 mL  UOP: 200 mL  IVF: 900 mL    Complications: None   Findings: On laparoscopy, entry point free from injury. Omentum adhered to the anterior abdominal wall taken down without difficulty. Uterus surgically absent. Normal appearing right ovary and fallopian tube. Normal appearing left fallopian tube. Left ovary fully encompassed with smooth ~6cm cyst. Left ureter seen vermiculating in the pelvis well away from surgical site. Left ovary and cyst removed and placed on bag, which was brought up to the abdominal wall and had control rupture of chocolate brown colored cyst fluid without any spillage into the abdomen. Endometrial implants visible near the right IP and left pelvic side wall. Hemostatic at end of case.    Indications:  Yvonne Zarco is a 42 year old P2 who presented with pelvic pain and dyspareunia. She was found to have a complex 6cm left ovarian cyst suspicious for endometrioma versus hemorrhagic cyst. Discussed risks and benefits of above procedure and written informed consent signed.       Specimens: Left ovary    Procedure:  The patient was taken to the operating room where she underwent general anesthesia without difficulty.  She was placed in a supine position using yellow-fin stirrups.  The patient was examined for the above noted findings and then prepped and draped in the usual sterile fashion. A Monet catheter was placed in the bladder.      Attention was then turned to the abdomen where an 11-blade scalpel was used to make a 5 mm vertical incision inferior to the umbilicus and a [place] used to expand the  incision. A 5 mm trochar with Visiport and scope was placed through the site with direct visualization and intra-abdominal placement was confirmed after trochar removal. CO2 gas was attached to the port with good opening pressure noted. Pneumoperitoneum was achieved with good tympany of the abdomen. The 5 mm scope was placed in the port and visualized the abdomen which was free of any injury. Above noted findings seen.     Attention was turned to the right lower quadrant of the abdomen where a site 2 cm medial and superior to the anterior superior iliac crest was noted to be free of major blood vessels, marcaine injected, and a 5 mm horizontal skin incision made. The incision was stretched with a Lenoir. A 5 mm port was placed under visualization within the abdomen. An additional 5 mm port was placed in the left lower quadrant of the abdomen with similar technique under visualization.      Inspection of the pelvis showed above findings. The omental adhesions to the anterior abdominal wall were taken down with sequential Ligasure bites. Hemostasis noted. Patient was placed in Trendelenburg. The left ureter was seen in the pelvis. The left infundibulopelvic ligament was grasped with the Ligasure and cauterized and cut directly inferior to the ovary. With sequential bites, the left ovary and cyst were amputated. The pedicle was noted to be hemostatic. The left ovary and cyst were placed in a laparoscopic bag and brought up to the abdominal wall. The left lower quadrant port was widened to 10mm. A 15 gauge needle with syringe attached was used to puncture the bag and chocolate broad cyst colored fluid was seen and suctioned. No spillage happened in the abdomen. With the controlled cyst rupture, the ovary and cyst could be removed from the abdomen. A final view of the pelvic ensured hemostasis. A Candido Wright was used to close the 10mm left lower quadrant incision.     The ports were removed under direct  visualization, and the pneumoperitoneum was expelled. The skin incisions were re-aproximated with 4-0 vicryl and steri strips were placed.        The patient was repositioned to the supine position.  The patient tolerated the procedure well and was taken to the recovery room in stable condition.  Dr. Fowler and Mely were scrubbed and present for the entire procedure.       Jessie Linares MD   OBGYN, PGY-4  5/31/2018 9:25 AM

## 2018-05-31 NOTE — ANESTHESIA POSTPROCEDURE EVALUATION
Patient: Yvonne Benjaminr    Procedure(s):  Adhesiolysis and Left Laparoscopic Oophorectomy - Wound Class: I-Clean    Diagnosis:Complex Left Ovarian Cyst   Diagnosis Additional Information: No value filed.    Anesthesia Type:  General, ETT    Note:  Anesthesia Post Evaluation    Patient location during evaluation: PACU  Patient participation: Able to fully participate in evaluation  Level of consciousness: sleepy but conscious and responsive to verbal stimuli  Pain management: adequate  Airway patency: patent  Cardiovascular status: hemodynamically stable  Respiratory status: spontaneous ventilation and room air  Hydration status: acceptable  PONV: none     Anesthetic complications: None    Comments: Pain controlled. Drinking water, no nausea. No questions regarding anesthesia.        Last vitals:  Vitals:    05/31/18 1030 05/31/18 1045 05/31/18 1100   BP: 121/77 110/73 112/68   Resp: 10 22 17   Temp:   36.7  C (98.1  F)   SpO2: 98% 93% 93%         Electronically Signed By: Tangela Singh MD  May 31, 2018  11:26 AM

## 2018-05-31 NOTE — ANESTHESIA PROCEDURE NOTES
Peripheral Nerve Block Procedure Note    Staff:     Anesthesiologist:  GORGE HEADLEY  Location: Pre-op  Procedure Start/Stop TImes:      5/31/2018 7:20 AM     5/31/2018 7:26 AM    patient identified, IV checked, site marked, risks and benefits discussed, informed consent, monitors and equipment checked, pre-op evaluation, at physician/surgeon's request and post-op pain management      Correct Patient: Yes      Correct Position: Yes      Correct Site: Yes      Correct Procedure: Yes      Correct Laterality:  Yes    Site Marked:  Yes  Procedure details:     Procedure:  TAP    ASA:  2    Laterality:  Bilateral    Position:  Supine    Sterile Prep: chloraprep, patient draped, mask and sterile gloves      Needle:  Insulated    Needle gauge:  20    Needle length (inches):  4    Ultrasound: Yes      Ultrasound used to identify targeted nerve, plexus, or vascular structure and placed a needle adjacent to it      Permanent Image entered into patiient's record      Abnormal pain on injection: No      Blood Aspirated: No      Paresthesias:  No    Bleeding at site: No      Complications:  None

## 2018-05-31 NOTE — OR NURSING
More awake still has left sided abdominal pain, but it uis improving after dialaudid Lap sites are dry and intact.  Unable to take ibuprofen due to gastric sleeve surgery. sats improved with nasal cannula  she is more awake, but still sleepy

## 2018-05-31 NOTE — DISCHARGE INSTRUCTIONS
Same-Day Surgery   Adult Discharge Orders & Instructions     For 24 hours after surgery:  1. Get plenty of rest.  A responsible adult must stay with you for at least 24 hours after you leave the hospital.   2. Pain medication can slow your reflexes. Do not drive or use heavy equipment.  If you have weakness or tingling, don't drive or use heavy equipment until this feeling goes away.  3. Mixing alcohol and pain medication can cause dizziness and slow your breathing. It can even be fatal. Do not drink alcohol while taking pain medication.  4. Avoid strenuous or risky activities.  Ask for help when climbing stairs.   5. You may feel lightheaded.  If so, sit for a few minutes before standing.  Have someone help you get up.   6. If you have nausea (feel sick to your stomach), drink only clear liquids such as apple juice, ginger ale, broth or 7-Up.  Rest may also help.  Be sure to drink enough fluids.  Move to a regular diet as you feel able. Take pain medications with a small amount of solid food, such as toast or crackers, to avoid nausea.   7. A slight fever is normal. Call the doctor if your fever is over 100 F (37.7 C) (taken under the tongue) or lasts longer than 24 hours.  8. You may have a dry mouth, muscle aches, trouble sleeping or a sore throat.  These symptoms should go away after 24 hours.  9. Do not make important or legal decisions.   Pain Management:      1. Take pain medication (if prescribed) for pain as directed by your physician.        2. WARNING: If the pain medication you have been prescribed contains Tylenol  (acetaminophen), DO NOT take additional doses of Tylenol (acetaminophen).     Call your doctor for any of the followin.  Signs of infection (fever, growing tenderness at the surgery site, severe pain, a large amount of drainage or bleeding, foul-smelling drainage, redness, swelling).    2.  It has been over 8 to 10 hours since surgery and you are still not able to urinate (pee).    3.   Headache for over 24 hours.    4.  Numbness, tingling or weakness the day after surgery (if you had spinal anesthesia).  To contact a doctor, call _____________________________________ or:      [phone] and ask for the Resident On Call for:          __________________________________________ (answered 24 hours a day)      Emergency Department:  [facility]: [phone]  [facility]: [phone]               Rev. 10/2014   Scopolamine Patch  (Absorbed through the skin)    The Scopolamine Patch prevents nausea and vomiting caused by motion sickness or anesthesia and surgery in adults.    Brand Name(s): Transderm Scop, Transderm-Scope  There may be other brand names for this medicine.    When This Medicine Should Not Be Used:  You should not use this medicine if you have had an allergic reaction to scopolamine, or if you have narrow angle glaucoma.    How to Use This Medicine:    Your doctor will tell you how many patches to use, where to apply them, and how often to apply them.     Do not use more patches or apply them more often than your doctor tells you to.    This medicine comes with patient instructions. Read and follow these instructions carefully. Ask your doctor or pharmacist if you have any questions.    To prevent motion sickness, apply the patch at least 4 hours before you need it.    Wash and dry your hands thoroughly before applying the patch.    Leave the patch in its sealed wrapper until you are ready to put it on. Tear the wrapper open carefully. NEVER CUT the wrapper or the patch with scissors. Do not use any patch that has been cut by accident.    Take the liner off the sticky side before applying.    Apply the patch to dry, hairless skin behind the ear.    If the patch is loose or falls off, apply a new patch at a different place behind the ear.    After you take off the patch, wash the place where the patch was and your hands thoroughly.    Only one  patch should be used at any time.    If a dose is missed:  If you forget to wear or change a patch, put one on as soon as you can. If it is almost time to put on your next patch, wait until then to apply a new patch and skip the one you missed. Do not apply extra patches to make up for a missed dose.    How to Store and Dispose of This Medicine:    Store the patches at room temperature in a closed container, away from heat, moisture and direct light.    Fold the used patch in half with the sticky sides together. Throw any used patch away so that children or pets cannot get to it. You will also need to throw away old patches after the expiration date has passed.    Keep all medicine away from children and never share your medicine with anyone.    Ask your doctor or pharmacist before using any other medicine, including over-the-counter medicines, vitamins, and herbal products.  Tell your doctor if you are using any medicines that make you sleepy. These include sleeping pills, cold and allergy medicine, narcotic pain relievers and sedatives.     Tell your doctor if you are using any medicine that make you sleepy. These include sleeping pills, yue and allergy medicine, narcotic pain relievers and sedatives.    Do not drink alcohol while you are using this medicine.     Warnings While Using This Medicine:    Make sure your doctor knows if you are pregnant or breastfeeding or if you have glaucoma, prostate problems, trouble urinating, blocked bowels, liver disease, kidney disease or a history of seizures or mental illness.    This medicine can cause blurring of vision and other vision problems if it comes in contact with the eyes. This medicine may also cause problems with urination. If any of these reactions occur, remove the patch and call your doctor right away.    This medicine may make you dizzy or drowsy. Avoid driving, using machines, or doing anything else that could be dangerous if you are not alert. If you plan  to participate in underwater sports, this medicine may cause disorienting effects. If this is a concern for you, talk with your doctor.    This medicine may make you sweat less and cause your body to get too hot. Be careful in hot weather, when you are exercising or if using sauna or whirlpool.    Make sure any doctor or dentist who treats you knows that you are using this medicine. This medicine may affect the results of certain medical tests.    Skin burns have been reported at the patch site in several patients wearing an aluminized transdermal system during a magnetic resonance imaging scan (MRI). Because Transderm Scop contains aluminum, it is recommended to remove the system before undergoing an MRI.    Call your doctor right away if you notice any of these side effects:    Allergic reaction: Itching or hives, swelling in your face or hands, swelling or tingling in your mouth or throat, chest tightness, trouble breathing    Blurred vision    Confusion or memory loss    Fast, slow or uneven heartbeat    Lightheadedness, dizziness, drowsiness or fainting    Seeing, hearing or feeling things that are not there    Severe eye pain    Trouble urinating    If you notice these less serious side effects, talk with your doctor:    Dry mouth    Dry, itchy or red eyes    Restlessness    Skin rash or redness    If you notice other side effects that you think are caused by this medicine, tell your doctor immediately.    Rev. 4/2014    Discharge Instructions:   Following a Laparoscopy    Comfort:    The amount of discomfort you can expect is very unpredictable.     If you have pain that cannot be controlled with non aspirin medication or with the prescription medication you may have received, you should notify your physician.     You May Experience:    Abdominal tenderness; abdominal cramps (like menstrual cramps).    Low back ache or discomfort radiating to your shoulders, chest, back or neck. This is a result of the gas  "used to inflate your abdomen during surgery. This gas is absorbed in 24 to 36 hours. The \"knee chest\" position will help relieve this discomfort.    Sore throat for a day or two resulting from the anesthesia tube used during surgery. You may use throat lozenges to help relieve this discomfort.    Black and blue marks on your abdomen.    Drainage:    You may expect a small amount of drainage from the incision on your abdomen and you may change the bandage when necessary.    You may also have a small amount of vaginal drainage for 3 to 4 days; this is normal and no cause for concern. If excessive bleeding occurs, notify your physician.    Do not douche, and use a pad rather than tampons. Do not resume intercourse for at least one week or until bleeding has ceased.    Home Activity:    The day of surgery spend a quiet day at home.    Increase activity as tolerated.    You may bathe or shower, do not soak in bath tub or scrub incisions.    You have no restrictions on your diet. Following surgery, drink plenty of fluids and eat a light meal.    The anesthesia may produce some nausea. If you feel nauseated, stay in bed, keep your head down and try drinking fluids such as Seven-Up, tea or soup.    Notify Physician at Once IF:    You have a fever over 100 degrees. A low grade fever (under 100 degrees) is usual after surgery.    You have severe pain.    You have a large amount of bleeding or drainage.    Rev. 4/2014  .[facility]  "

## 2018-05-31 NOTE — OR NURSING
"Taking over care.  Pt preparing to leave and waiting for bathroom to open up.  Lap sites covered and dry and intact.  Pt easily up to the bathroom to void orange colored urine.  Yani pad without drainage.  Pt states \"pain controlled and comfortable\".  Vital signs stable.  Meets discharge criteria.  "

## 2018-06-01 ENCOUNTER — TELEPHONE (OUTPATIENT)
Dept: OBGYN | Facility: CLINIC | Age: 43
End: 2018-06-01

## 2018-06-01 NOTE — TELEPHONE ENCOUNTER
Patient calling for different pain medications. Underwent laparoscpic cystectomy yesterday and the oxycodone is making her head odd and have weird dreams. She otherwise feels well without nausea or vomiting; however, with more soreness and would like a different narcotic prescribed. Instructed her to call the clinic so this prescription can be written for her and so someone can pick it up for her.    Jessie Linares MD   OBGYN, PGY-4  6/1/2018 8:00 AM

## 2018-06-06 ENCOUNTER — MYC MEDICAL ADVICE (OUTPATIENT)
Dept: OBGYN | Facility: CLINIC | Age: 43
End: 2018-06-06

## 2018-06-06 ENCOUNTER — TELEPHONE (OUTPATIENT)
Dept: OBGYN | Facility: CLINIC | Age: 43
End: 2018-06-06

## 2018-06-06 NOTE — TELEPHONE ENCOUNTER
Patient called today.    Patient is requesting work extension for absence letter till June 11, 2018 from Maritza Fowler MD at [employer].    Please contact patient.    Thank you.    Central Scheduling  Sarina ROBLERO.

## 2018-06-06 NOTE — TELEPHONE ENCOUNTER
Letter generated. Fax to [facility] at [phone]. Patient aware.  Claribel Francis

## 2018-06-06 NOTE — LETTER
[address] [address]  Phone: [phone]  Fax: [phone]      June 6, 2018      RE: Yvonne Zarco  [id number] [place] [address] [place] [facility] [address]        To whom it may concern:    Yvonne Zarco is under my professional care. She recently had surgery on 5/31/2018. Due to significant postoperative pain, Yvonne will need to remain off of work until 6/11/2018. At this time (6/11/2018), patient will be able to return to work without restrictions. If you have any questions, please call my office at [phone]. Thanks you!      Sincerely,            Maritza Fowler MD/jammie

## 2018-06-07 LAB — COPATH REPORT: NORMAL

## 2018-06-23 ENCOUNTER — HEALTH MAINTENANCE LETTER (OUTPATIENT)
Age: 43
End: 2018-06-23

## 2018-08-07 ENCOUNTER — TRANSFERRED RECORDS (OUTPATIENT)
Dept: HEALTH INFORMATION MANAGEMENT | Facility: CLINIC | Age: 43
End: 2018-08-07

## 2018-08-28 ENCOUNTER — TRANSFERRED RECORDS (OUTPATIENT)
Dept: HEALTH INFORMATION MANAGEMENT | Facility: CLINIC | Age: 43
End: 2018-08-28

## 2018-10-31 NOTE — PROGRESS NOTES
SUBJECTIVE:   Yvonne Zarco is a 43 year old female who presents to clinic today for the following health issues:    Chief Complaint   Patient presents with     Anxiety     Gastrophageal Reflux       Anxiety Follow-Up    Status since last visit: stable    Other associated symptoms:None    Complicating factors:   Significant life event: No   Current substance abuse: None  Depression symptoms: No    ZI-7 SCORE 2016   Total Score 7       ZI-7    Amount of exercise or physical activity: None    Problems taking medications regularly: No    Medication side effects: maybe fatigue but could be from the weather    Diet: smaller portions due to gastric sleeve years ago    -GERD is typically well controlled on Nexium although has worsened in the last week secondary to taking Augmentin for cat bite infection.   -Wellbutrin helps for cigarette cravings but still smokes 3-4 cigarettes/day. Seems to also help for her anxiety.  -Patient took Fosamax for 2 months and then stopped due to esophageal irritation and concern that she could not have dental work done in office.    Problem list and histories reviewed & adjusted, as indicated.  Additional history: as documented    Patient Active Problem List   Diagnosis     GERD (gastroesophageal reflux disease)     Bariatric surgery status     Anxiety     Tobacco use disorder     Lumbago     Complex cyst of left ovary     Osteopenia, unspecified location     Past Surgical History:   Procedure Laterality Date      SECTION      x2     CHOLECYSTECTOMY       ESOPHAGOSCOPY, GASTROSCOPY, DUODENOSCOPY (EGD), COMBINED  2012    Procedure: COMBINED ESOPHAGOSCOPY, GASTROSCOPY, DUODENOSCOPY (EGD), BIOPSY SINGLE OR MULTIPLE;;  Surgeon: Scott Seth MD;  Location:  GI     GENITOURINARY SURGERY  ??    bladder sling with hysterectomy early      GYN SURGERY      tubal ligation     hysterectomy with bladder sling      left ovaries     HYSTERECTOMY, PAP NO LONGER  INDICATED      Abdominal hyst     LAPAROSCOPIC GASTRIC SLEEVE  5/10/2013    Procedure: LAPAROSCOPIC GASTRIC SLEEVE;  Laparoscopic Sleeve Gastrectomy with hiatal hernia repair;  Surgeon: Scott Seth MD;  Location: [facility]     LAPAROSCOPIC OOPHORECTOMY Left 2018    Procedure: LAPAROSCOPIC OOPHORECTOMY;  Adhesiolysis and Left Laparoscopic Oophorectomy;  Surgeon: Maritza Fowler MD;  Location: [facility] OR     LASIK BILATERAL  2000     ORTHOPEDIC SURGERY      right foot surgery       Social History   Substance Use Topics     Smoking status: Current Some Day Smoker     Packs/day: 0.50     Years: 20.00     Types: Cigarettes     Smokeless tobacco: Never Used      Comment: Tried many times; 3-5cigarettes/day      Alcohol use No      Comment: seldom      Family History   Problem Relation Age of Onset     Hypertension Father           Substance Abuse Father         alcoholism     Diabetes Mother      diet controlled     Hypertension Mother      meds     Coronary Artery Disease Mother 67     quadruple bypass     Hyperlipidemia Mother      Substance Abuse Mother      alcoholism     Obesity Mother      Hypertension Sister      Anxiety Disorder Sister      Anxiety Disorder Daughter      Anxiety Disorder Niece      Hypertension Sister      meds     Anxiety Disorder Sister      no treated     Hyperlipidemia Sister      Brain Tumor Other      Heart Failure Other      Anxiety Disorder Daughter      on meds     Anesthesia Reaction Daughter      Asthma Daughter      Anxiety Disorder Niece      on meds     Substance Abuse Son      drug addiction     Cancer No family hx of      Cerebrovascular Disease No family hx of      Thyroid Disease No family hx of      Glaucoma No family hx of      Macular Degeneration No family hx of            Reviewed and updated as needed this visit by clinical staff       Reviewed and updated as needed this visit by Provider         ROS:  Constitutional, HEENT, cardiovascular,  "pulmonary, GI, , musculoskeletal, neuro, skin, endocrine and psych systems are negative, except as otherwise noted.    OBJECTIVE:     /84 (BP Location: Left arm, Patient Position: Chair, Cuff Size: Adult Regular)  Pulse 83  Temp 98.1  F (36.7  C) (Oral)  Ht 5' 1\" (1.549 m)  Wt 159 lb (72.1 kg)  SpO2 98%  BMI 30.04 kg/m2  Body mass index is 30.04 kg/(m^2).  GENERAL: healthy, alert and no distress  PSYCH: mentation appears normal, affect normal/bright    Diagnostic Test Results:  No results found for this or any previous visit (from the past 24 hour(s)).    ASSESSMENT/PLAN:     1. Anxiety  Stable, continue current medications without change  - PARoxetine (PAXIL) 40 MG tablet; Take 0.5 tablets (20 mg) by mouth At Bedtime  Dispense: 45 tablet; Refill: 3    2. Gastroesophageal reflux disease, esophagitis presence not specified  Well controlled on Nexium- continue without change    3. Osteoporosis without current pathological fracture, unspecified osteoporosis type  After reviewing risks and benefits, patient interested in starting Prolia injections. Baseline labs today. Will contact MTM.  - Vitamin D Deficiency  - Calcium  - Magnesium  - Phosphorus    4. Visit for screening mammogram    - MA SCREENING DIGITAL BILAT - Future  (s+30); Future    5. Tobacco use disorder    - buPROPion (WELLBUTRIN SR) 150 MG 12 hr tablet; Take 1 tablet once daily and increase to 1 tablet twice daily after 4 to 7 days  Dispense: 180 tablet; Refill: 3      See Patient Instructions    [facility]    "

## 2018-11-07 ENCOUNTER — OFFICE VISIT (OUTPATIENT)
Dept: FAMILY MEDICINE | Facility: CLINIC | Age: 43
End: 2018-11-07
Payer: COMMERCIAL

## 2018-11-07 VITALS
TEMPERATURE: 98.1 F | HEART RATE: 83 BPM | OXYGEN SATURATION: 98 % | WEIGHT: 159 LBS | SYSTOLIC BLOOD PRESSURE: 128 MMHG | DIASTOLIC BLOOD PRESSURE: 84 MMHG | BODY MASS INDEX: 30.02 KG/M2 | HEIGHT: 61 IN

## 2018-11-07 DIAGNOSIS — F41.9 ANXIETY: Primary | ICD-10-CM

## 2018-11-07 DIAGNOSIS — F17.200 TOBACCO USE DISORDER: ICD-10-CM

## 2018-11-07 DIAGNOSIS — M81.0 OSTEOPOROSIS WITHOUT CURRENT PATHOLOGICAL FRACTURE, UNSPECIFIED OSTEOPOROSIS TYPE: ICD-10-CM

## 2018-11-07 DIAGNOSIS — K21.9 GASTROESOPHAGEAL REFLUX DISEASE, ESOPHAGITIS PRESENCE NOT SPECIFIED: ICD-10-CM

## 2018-11-07 DIAGNOSIS — Z12.31 VISIT FOR SCREENING MAMMOGRAM: ICD-10-CM

## 2018-11-07 PROCEDURE — 82306 VITAMIN D 25 HYDROXY: CPT | Performed by: NURSE PRACTITIONER

## 2018-11-07 PROCEDURE — 99214 OFFICE O/P EST MOD 30 MIN: CPT | Performed by: NURSE PRACTITIONER

## 2018-11-07 PROCEDURE — 84100 ASSAY OF PHOSPHORUS: CPT | Performed by: NURSE PRACTITIONER

## 2018-11-07 PROCEDURE — 36415 COLL VENOUS BLD VENIPUNCTURE: CPT | Performed by: NURSE PRACTITIONER

## 2018-11-07 PROCEDURE — 82310 ASSAY OF CALCIUM: CPT | Performed by: NURSE PRACTITIONER

## 2018-11-07 PROCEDURE — 83735 ASSAY OF MAGNESIUM: CPT | Performed by: NURSE PRACTITIONER

## 2018-11-07 RX ORDER — PAROXETINE 40 MG/1
20 TABLET, FILM COATED ORAL AT BEDTIME
Qty: 45 TABLET | Refills: 3 | Status: SHIPPED | OUTPATIENT
Start: 2018-11-07 | End: 2019-11-16

## 2018-11-07 RX ORDER — BUPROPION HYDROCHLORIDE 150 MG/1
TABLET, EXTENDED RELEASE ORAL
Qty: 180 TABLET | Refills: 3 | Status: SHIPPED | OUTPATIENT
Start: 2018-11-07 | End: 2020-01-08

## 2018-11-07 ASSESSMENT — ANXIETY QUESTIONNAIRES
1. FEELING NERVOUS, ANXIOUS, OR ON EDGE: MORE THAN HALF THE DAYS
5. BEING SO RESTLESS THAT IT IS HARD TO SIT STILL: SEVERAL DAYS
7. FEELING AFRAID AS IF SOMETHING AWFUL MIGHT HAPPEN: MORE THAN HALF THE DAYS
GAD7 TOTAL SCORE: 13
2. NOT BEING ABLE TO STOP OR CONTROL WORRYING: MORE THAN HALF THE DAYS
IF YOU CHECKED OFF ANY PROBLEMS ON THIS QUESTIONNAIRE, HOW DIFFICULT HAVE THESE PROBLEMS MADE IT FOR YOU TO DO YOUR WORK, TAKE CARE OF THINGS AT HOME, OR GET ALONG WITH OTHER PEOPLE: NOT DIFFICULT AT ALL
6. BECOMING EASILY ANNOYED OR IRRITABLE: MORE THAN HALF THE DAYS
3. WORRYING TOO MUCH ABOUT DIFFERENT THINGS: MORE THAN HALF THE DAYS

## 2018-11-07 ASSESSMENT — PAIN SCALES - GENERAL: PAINLEVEL: MILD PAIN (2)

## 2018-11-07 ASSESSMENT — PATIENT HEALTH QUESTIONNAIRE - PHQ9: 5. POOR APPETITE OR OVEREATING: MORE THAN HALF THE DAYS

## 2018-11-07 NOTE — MR AVS SNAPSHOT
After Visit Summary   11/7/2018    Yvonne Zarco    MRN: [id number]           Patient Information     Date Of Birth          1975        Visit Information        Provider Department      11/7/2018 5:00 PM Rima Mccollum APRN CNP [facility]        Today's Diagnoses     Anxiety    -  1    Gastroesophageal reflux disease, esophagitis presence not specified        Osteoporosis without current pathological fracture, unspecified osteoporosis type        Visit for screening mammogram        Tobacco use disorder        Need for prophylactic vaccination and inoculation against influenza          Care Instructions    Talk to your dentist about [facility]    If you have any questions regarding to your visit please contact your care team:       Team Red:   Clinic Hours Telephone Number   Dr. Ebonie Mccollum NP 7am-7pm  Monday - Thursday   7am-5pm  Fridays  [phone]  (Appointment scheduling available 24/7)   Urgent Care - [place] and [facility] - 11am-9pm Monday-Friday Saturday-Sunday- 9am-5pm   [place] - 5pm-9pm Monday-Friday Saturday-Sunday- 9am-5pm  [phone] - [place]  [phone] - [place]       What options do I have for a visit other than an office visit? We offer electronic visits (e-visits) and telephone visits, when medically appropriate.  Please check with your medical insurance to see if these types of visits are covered, as you will be responsible for any charges that are not paid by your insurance.      You can use ELIKE (secure electronic communication) to access to your chart, send your primary care provider a message, or make an appointment. Ask a team member how to get started.     For a price quote for your services, please call our Consumer Price Line at [phone] or our Imaging Cost estimation line at [phone] (for imaging tests).    Discharged by  "Chloe Breaux MA.            Follow-ups after your visit        Your next 10 appointments already scheduled     Dec 03, 2018  9:20 AM CST   New Visit with Jason Becker OD   [facility] ([facility])    [address] [place] [address]  [place] [address] [phone]              Future tests that were ordered for you today     Open Future Orders        Priority Expected Expires Ordered    MA SCREENING DIGITAL BILAT - Future  (s+30) Routine  10/31/2019 11/7/2018            Who to contact     If you have questions or need follow up information about today's clinic visit or your schedule please contact [facility] directly at [phone].  Normal or non-critical lab and imaging results will be communicated to you by RestoMestohart, letter or phone within 4 business days after the clinic has received the results. If you do not hear from us within 7 days, please contact the clinic through Legal Shinet or phone. If you have a critical or abnormal lab result, we will notify you by phone as soon as possible.  Submit refill requests through Scancell or call your pharmacy and they will forward the refill request to us. Please allow 3 business days for your refill to be completed.          Additional Information About Your Visit        Scancell Information     Scancell gives you secure access to your electronic health record. If you see a primary care provider, you can also send messages to your care team and make appointments. If you have questions, please call your primary care clinic.  If you do not have a primary care provider, please call [phone] and they will assist you.        Care EveryWhere ID     This is your Care EveryWhere ID. This could be used by other organizations to access your [place] medical records  [id number]        Your Vitals Were     Pulse Temperature Height Pulse Oximetry BMI (Body Mass Index)       83 98.1  F (36.7  C) (Oral) 5' 1\" (1.549 m) 98% 30.04 kg/m2     "    Blood Pressure from Last 3 Encounters:   11/07/18 128/84   05/31/18 104/65   05/15/18 118/84    Weight from Last 3 Encounters:   11/07/18 159 lb (72.1 kg)   05/31/18 154 lb 1.6 oz (69.9 kg)   05/15/18 155 lb (70.3 kg)              We Performed the Following     Calcium     Magnesium     Phosphorus     Vitamin D Deficiency          Today's Medication Changes          These changes are accurate as of 11/7/18  5:28 PM.  If you have any questions, ask your nurse or doctor.               Stop taking these medicines if you haven't already. Please contact your care team if you have questions.     alendronate 70 MG tablet   Commonly known as:  FOSAMAX   Stopped by:  Rima Mccollum APRN CNP                Where to get your medicines      These medications were sent to [facility] [address] [facility]  [address] [facility] [address]     Phone:  [phone]     buPROPion 150 MG 12 hr tablet    PARoxetine 40 MG tablet                Primary Care Provider Office Phone # Fax #    [facility] [phone]       [address] [place] [address]        Equal Access to Services     JAGDISH QUEZADA AH: Hadii patrick ku hadasho Soomaali, waaxda luqadaha, qaybta kaalmada adeegyada, surjit telles. So [facility] [phone].    ATENCIÓN: Si habla español, tiene a javier disposición servicios gratuitos de asistencia lingüística. Masha al [phone].    We comply with applicable federal civil rights laws and [place] laws. We do not discriminate on the basis of race, color, national origin, age, disability, sex, sexual orientation, or gender identity.            Thank you!     Thank you for choosing [facility]  for your care. Our goal is always to provide you with excellent care. Hearing back from our patients is one way we can continue to improve our services. Please take a few minutes to complete the written  survey that you may receive in the mail after your visit with us. Thank you!             Your Updated Medication List - Protect others around you: Learn how to safely use, store and throw away your medicines at www.disposemymeds.org.          This list is accurate as of 11/7/18  5:28 PM.  Always use your most recent med list.                   Brand Name Dispense Instructions for use Diagnosis    amoxicillin-clavulanate 875-125 MG per tablet    AUGMENTIN     TAKE 1 TABLET BY MOUTH TWICE A DAY FOR 10 DAYS        benzoyl peroxide 5 % Liqd     120 mL    Apply to entire face and then wash off after 5 min daily. Then apply topical clindamycin. May bleach towels.    Acne       buPROPion 150 MG 12 hr tablet    WELLBUTRIN SR    180 tablet    Take 1 tablet once daily and increase to 1 tablet twice daily after 4 to 7 days    Tobacco use disorder       calcium carbonate 500 MG chewable tablet    TUMS     Take 2 chew tab by mouth 2 times daily as needed for heartburn        calcium Citrate-vitamin D 500-400 MG-UNIT Chew     60 tablet    Take 500 mg by mouth 2 times daily    Unspecified intestinal malabsorption, Status post bariatric surgery       DAILY MULTIVITAMIN PO      Take  by mouth. Take 2 chewable tablets daily    Bariatric surgery status       esomeprazole 20 MG CR capsule    nexIUM    84 capsule    Take 1 capsule (20 mg) by mouth 2 times daily Take 30-60 minutes before eating.    Gastroesophageal reflux disease, esophagitis presence not specified       mometasone 50 MCG/ACT spray    NASONEX     Spray 2 sprays into both nostrils daily        PARoxetine 40 MG tablet    PAXIL    45 tablet    Take 0.5 tablets (20 mg) by mouth At Bedtime    Anxiety       UNJURY Powd     2 Bottle    Take 21 g by mouth daily Unjury 21 gram-100 kcal/27 gram Powder    Bariatric surgery status       VITAMIN B 12 PO      Take 500 mcg by mouth daily

## 2018-11-07 NOTE — Clinical Note
Natanael Roberts,  This patient would like to start Prolia injections. I can't recall the protocol, but I think I just send the chart to you first? I did her baseline labs today.  Rima Mccollum, CNP

## 2018-11-08 LAB
CALCIUM SERPL-MCNC: 8.6 MG/DL (ref 8.5–10.1)
MAGNESIUM SERPL-MCNC: 2.3 MG/DL (ref 1.6–2.3)
PHOSPHATE SERPL-MCNC: 3.7 MG/DL (ref 2.5–4.5)

## 2018-11-08 ASSESSMENT — ANXIETY QUESTIONNAIRES: GAD7 TOTAL SCORE: 13

## 2018-11-09 LAB — DEPRECATED CALCIDIOL+CALCIFEROL SERPL-MC: 29 UG/L (ref 20–75)

## 2018-11-12 ENCOUNTER — TELEPHONE (OUTPATIENT)
Dept: PHARMACY | Facility: CLINIC | Age: 43
End: 2018-11-12

## 2018-11-12 NOTE — TELEPHONE ENCOUNTER
Received request from Rima Mccollum NP to start insurance verification process for Yvonne to receive Prolia.  Needed to have Rima added to verification queue, which I've submitted today.  This typically takes 1-2 days, then I should be able to submit the insurance request.    Alejandra Hall, PharmD, [facility]  Medication Therapy Management Provider  Pager: [phone]

## 2018-11-14 NOTE — TELEPHONE ENCOUNTER
Submitted Prolia insurance verification request via [employer].  Please leave encounter open; will await response.    Alejandra Hall PharmD, [facility]  Medication Therapy Management Provider  Pager: [phone]

## 2018-11-20 NOTE — TELEPHONE ENCOUNTER
Prolia is covered 100% since pt has met deductible and out of pocket max.  Routing to team coordinators to call pt to schedule RN Prolia injection.  FYI only to Rima.    Ab HernandezD, [facility]  Medication Therapy Management Provider  Pager: [phone]

## 2018-11-21 ENCOUNTER — MYC MEDICAL ADVICE (OUTPATIENT)
Dept: FAMILY MEDICINE | Facility: CLINIC | Age: 43
End: 2018-11-21

## 2018-11-21 NOTE — TELEPHONE ENCOUNTER
Order needed for the Prolia injection.    Left message to call TC line at [phone]. Katherine Schreiber,     Need to schedule Prolia injection    The provider has requested a Prolia injection for Yvonne  1. TC  Has left a message for Yvonne to call the TC line. Please schedule the injection on the RN Schedule  2. Please send email to medication ordering MA order med 1 week in advance  Make sure patient has not had any dental work including the jaw bone (i.e teeth extraction) 1 month prior to injection

## 2018-11-21 NOTE — TELEPHONE ENCOUNTER
You routed conversation to Rima Mccollum APRN CNP Just now (11:37 AM)                     ISH Ann CNP 4 minutes ago (11:33 AM)                           Natanael Abarca,     I have decided not to take the Prolia injection.  I read too much horrible stuff about it and I would rather take my chances.     Thank you!     Yvonne                   From mychart encounter 11/21/2018. Katherine Schreiber,

## 2018-11-27 NOTE — PROGRESS NOTES
SUBJECTIVE:   Yvonne Zarco is a 43 year old female who presents to clinic today for the following health issues:      Chief Complaint   Patient presents with     Derm Problem     Skin breakout from antibiotic      Patient's cat bit her about 1 month ago, she was treated with augmentin,after finishing the course she had an acne outbreak on her chest, shoulders, upper back and forehead.  She would like refills of her acne medications.    She states this usually happens when she takes antibiotics.    Problem list and histories reviewed & adjusted, as indicated.  Additional history: as documented    BP Readings from Last 3 Encounters:   11/29/18 118/78   11/07/18 128/84   05/31/18 104/65    Wt Readings from Last 3 Encounters:   11/29/18 157 lb (71.2 kg)   11/07/18 159 lb (72.1 kg)   05/31/18 154 lb 1.6 oz (69.9 kg)        Reviewed and updated as needed this visit by clinical staff  Tobacco  Allergies  Meds  Problems       Reviewed and updated as needed this visit by Provider  Allergies  Meds  Problems         ROS:  Constitutional, HEENT, cardiovascular, pulmonary, gi and gu systems are negative, except as otherwise noted.    OBJECTIVE:     /78  Pulse 105  Temp 99.3  F (37.4  C) (Oral)  Resp 16  Wt 157 lb (71.2 kg)  SpO2 93%  BMI 29.66 kg/m2  Body mass index is 29.66 kg/(m^2).  GENERAL: healthy, alert and no distress  EYES: Eyes grossly normal to inspection, PERRL and conjunctivae and sclerae normal  NECK: no adenopathy, no asymmetry, masses, or scars and thyroid normal to palpation  SKIN: inflammatory comedones on shoulders and forehead  PSYCH: mentation appears normal, affect normal/bright      ASSESSMENT/PLAN:     1. Acne, unspecified acne type  Will refill her derm medications, recommend taking probiotics while taking antibiotics  - clindamycin (CLEOCIN T) 1 % external lotion; Apply to entire face after washing with benzoyl peroxide wash daily.  Dispense: 60 mL; Refill: 5  - minocycline  (MINOCIN/DYNACIN) 100 MG capsule; TAKE 1 CAPSULE BY MOUTH TWICE A DAY FOR 2 WEEKS, then once daily.  Dispense: 105 capsule; Refill: 1  - Probiotic Product (PROBIOTIC DAILY) CAPS; Take 1 capsule by mouth daily While on antibiotics.  Dispense: 30 capsule; Refill: 3    2. Other acne  - minocycline (MINOCIN/DYNACIN) 100 MG capsule; TAKE 1 CAPSULE BY MOUTH TWICE A DAY FOR 2 WEEKS, then once daily.  Dispense: 105 capsule; Refill: 1  - benzoyl peroxide 5 % external liquid; Apply to entire face and then wash off after 5 min daily. Then apply topical clindamycin. May bleach towels.  Dispense: 226 g; Refill: 2  - Probiotic Product (PROBIOTIC DAILY) CAPS; Take 1 capsule by mouth daily While on antibiotics.  Dispense: 30 capsule; Refill: 3    Follow up if symptoms worsen or fail to improve.     Tong Antunez MD  [facility]

## 2018-11-29 ENCOUNTER — OFFICE VISIT (OUTPATIENT)
Dept: OPTOMETRY | Facility: CLINIC | Age: 43
End: 2018-11-29
Payer: COMMERCIAL

## 2018-11-29 ENCOUNTER — OFFICE VISIT (OUTPATIENT)
Dept: FAMILY MEDICINE | Facility: CLINIC | Age: 43
End: 2018-11-29
Payer: COMMERCIAL

## 2018-11-29 VITALS
TEMPERATURE: 99.3 F | RESPIRATION RATE: 16 BRPM | WEIGHT: 157 LBS | BODY MASS INDEX: 29.66 KG/M2 | DIASTOLIC BLOOD PRESSURE: 78 MMHG | OXYGEN SATURATION: 93 % | SYSTOLIC BLOOD PRESSURE: 118 MMHG | HEART RATE: 105 BPM

## 2018-11-29 DIAGNOSIS — H52.13 MYOPIA OF BOTH EYES: ICD-10-CM

## 2018-11-29 DIAGNOSIS — L70.9 ACNE, UNSPECIFIED ACNE TYPE: ICD-10-CM

## 2018-11-29 DIAGNOSIS — H52.4 PRESBYOPIA: ICD-10-CM

## 2018-11-29 DIAGNOSIS — H52.223 REGULAR ASTIGMATISM OF BOTH EYES: ICD-10-CM

## 2018-11-29 DIAGNOSIS — L70.8 OTHER ACNE: ICD-10-CM

## 2018-11-29 DIAGNOSIS — Z01.00 ENCOUNTER FOR EXAMINATION OF EYES AND VISION WITHOUT ABNORMAL FINDINGS: Primary | ICD-10-CM

## 2018-11-29 PROCEDURE — 92014 COMPRE OPH EXAM EST PT 1/>: CPT | Performed by: OPTOMETRIST

## 2018-11-29 PROCEDURE — 99213 OFFICE O/P EST LOW 20 MIN: CPT | Performed by: FAMILY MEDICINE

## 2018-11-29 PROCEDURE — 92015 DETERMINE REFRACTIVE STATE: CPT | Performed by: OPTOMETRIST

## 2018-11-29 RX ORDER — CLINDAMYCIN PHOSPHATE 10 UG/ML
LOTION TOPICAL
Qty: 60 ML | Refills: 5 | Status: SHIPPED | OUTPATIENT
Start: 2018-11-29 | End: 2019-10-03

## 2018-11-29 RX ORDER — MINOCYCLINE HYDROCHLORIDE 100 MG/1
CAPSULE ORAL
Qty: 105 CAPSULE | Refills: 1 | Status: SHIPPED | OUTPATIENT
Start: 2018-11-29 | End: 2019-10-03

## 2018-11-29 RX ORDER — MINOCYCLINE HYDROCHLORIDE 100 MG/1
CAPSULE ORAL
Refills: 0 | COMMUNITY
Start: 2018-11-26 | End: 2018-11-29

## 2018-11-29 RX ORDER — ZINC OXIDE 13 %
1 CREAM (GRAM) TOPICAL DAILY
Qty: 30 CAPSULE | Refills: 3 | Status: SHIPPED | OUTPATIENT
Start: 2018-11-29 | End: 2019-05-20

## 2018-11-29 RX ORDER — CLINDAMYCIN PHOSPHATE 10 MG/G
GEL TOPICAL
Refills: 0 | COMMUNITY
Start: 2018-11-26 | End: 2019-10-03

## 2018-11-29 ASSESSMENT — REFRACTION_WEARINGRX
SPECS_TYPE: SVL
OS_CYLINDER: +1.00
OS_AXIS: 088
OS_SPHERE: -0.50
OS_CYLINDER: +1.00
OS_SPHERE: -2.00
OD_CYLINDER: +1.75
OD_AXIS: 081
OD_SPHERE: -2.00
OD_CYLINDER: +1.25
OD_SPHERE: -0.25
OD_AXIS: 088
SPECS_TYPE: PAL
OS_AXIS: 113
OS_ADD: +2.25
OD_ADD: +2.25

## 2018-11-29 ASSESSMENT — VISUAL ACUITY
OD_CC: 20/30
OD_CC+: -1
OS_CC: 20/25
OD_SC: 20/60
OS_SC: 20/60
OD_SC: 20/80
OS_SC: 20/150
OD_CC: 20/20
OS_CC: 20/30
CORRECTION_TYPE: GLASSES
METHOD: SNELLEN - LINEAR

## 2018-11-29 ASSESSMENT — REFRACTION_MANIFEST
OS_AXIS: 085
OS_SPHERE: -2.25
OD_SPHERE: -2.25
OS_CYLINDER: +1.00
OD_ADD: +2.25
OD_AXIS: 098
OD_CYLINDER: +1.75
OS_ADD: +2.25

## 2018-11-29 ASSESSMENT — SLIT LAMP EXAM - LIDS
COMMENTS: NORMAL
COMMENTS: NORMAL

## 2018-11-29 ASSESSMENT — EXTERNAL EXAM - LEFT EYE: OS_EXAM: NORMAL

## 2018-11-29 ASSESSMENT — CUP TO DISC RATIO
OD_RATIO: 0.4
OS_RATIO: 0.3

## 2018-11-29 ASSESSMENT — TONOMETRY
IOP_METHOD: APPLANATION
OS_IOP_MMHG: 13
OD_IOP_MMHG: 13

## 2018-11-29 ASSESSMENT — CONF VISUAL FIELD
OS_NORMAL: 1
OD_NORMAL: 1

## 2018-11-29 ASSESSMENT — EXTERNAL EXAM - RIGHT EYE: OD_EXAM: NORMAL

## 2018-11-29 NOTE — LETTER
11/29/2018         RE: Yvonne Zarco  [address]  [facility]  [place] [address]        Dear Colleague,    Thank you for referring your patient, Yvonne Zarco, to the [facility]. Please see a copy of my visit note below.    Chief Complaint   Patient presents with     COMPREHENSIVE EYE EXAM      Accompanied by self  Last Eye Exam: 1 year ago/[facility]  Dilated Previously: Yes    What are you currently using to see?  Glasses-2 years old. Computer glasses od -0.25 + 1.25 x 081 os -0.50 +1.00 x 113- patient got glasses are [employer].     Distance Vision Acuity: Noticed gradual change in both eyes    Near Vision Acuity: Not satisfied     Eye Comfort: good  Do you use eye drops? : No  Occupation or Hobbies: insurance/referrals    Rachel Salas, Optometric Tech          Medical, surgical and family histories reviewed and updated 11/29/2018.       OBJECTIVE: See Ophthalmology exam    ASSESSMENT:    ICD-10-CM    1. Encounter for examination of eyes and vision without abnormal findings Z01.00    2. Myopia of both eyes H52.13    3. Regular astigmatism of both eyes H52.223    4. Presbyopia H52.4       PLAN:     Patient Instructions   Yvonne was advised of today's exam findings.  Fill glasses prescription  Allow 2 weeks to adapt to change in glasses  Wear glasses full time  Copy of glasses Rx provided today. Distance/Reading progressive bifocal and Computer/Reading progressive bifocal Rx provided today.   Return in 1-2 years for eye exam, or sooner if needed.    The affects of the dilating drops last for 4- 6 hours.  You will be more sensitive to light and vision will be blurry up close.  Mydriatic sunglasses were given if needed.      Jason Becker O.D.  [address]  [place], [place]  [address] [phone]           Again, thank you for allowing me to participate in the care of your patient.        Sincerely,        Jason Becker, OD

## 2018-11-29 NOTE — MR AVS SNAPSHOT
After Visit Summary   11/29/2018    Yvonne Zarco    MRN: [id number]           Patient Information     Date Of Birth          1975        Visit Information        Provider Department      11/29/2018 10:00 AM Jason Becker OD [facility]        Today's Diagnoses     Encounter for examination of eyes and vision without abnormal findings    -  1    Myopia of both eyes        Regular astigmatism of both eyes        Presbyopia          Care Instructions    Yvonne was advised of today's exam findings.  Fill glasses prescription  Allow 2 weeks to adapt to change in glasses  Wear glasses full time  Copy of glasses Rx provided today. Distance/Reading progressive bifocal and Computer/Reading progressive bifocal Rx provided today.   Return in 1-2 years for eye exam, or sooner if needed.    The affects of the dilating drops last for 4- 6 hours.  You will be more sensitive to light and vision will be blurry up close.  Mydriatic sunglasses were given if needed.      Jason Becker O.D.  [address]  [place]  [address]    [phone]            Follow-ups after your visit        Follow-up notes from your care team     Return in about 1 year (around 11/29/2019) for Comprehensive Eye Exam.      Who to contact     If you have questions or need follow up information about today's clinic visit or your schedule please contact [facility] directly at [phone].  Normal or non-critical lab and imaging results will be communicated to you by MyChart, letter or phone within 4 business days after the clinic has received the results. If you do not hear from us within 7 days, please contact the clinic through MyChart or phone. If you have a critical or abnormal lab result, we will notify you by phone as soon as possible.  Submit refill requests through [employer] or call your pharmacy and they will forward the refill request to us. Please allow 3  business days for your refill to be completed.          Additional Information About Your Visit        MyChart Information     [employer] gives you secure access to your electronic health record. If you see a primary care provider, you can also send messages to your care team and make appointments. If you have questions, please call your primary care clinic.  If you do not have a primary care provider, please call [phone] and they will assist you.        Care EveryWhere ID     This is your Care EveryWhere ID. This could be used by other organizations to access your [place] medical records  [id number]         Blood Pressure from Last 3 Encounters:   11/29/18 118/78   11/07/18 128/84   05/31/18 104/65    Weight from Last 3 Encounters:   11/29/18 71.2 kg (157 lb)   11/07/18 72.1 kg (159 lb)   05/31/18 69.9 kg (154 lb 1.6 oz)              Today, you had the following     No orders found for display         Today's Medication Changes          These changes are accurate as of 11/29/18 11:33 AM.  If you have any questions, ask your nurse or doctor.               Start taking these medicines.        Dose/Directions    PROBIOTIC DAILY Caps   Used for:  Other acne, Acne, unspecified acne type   Started by:  Tong Blakely MD        Dose:  1 capsule   Take 1 capsule by mouth daily While on antibiotics.   Quantity:  30 capsule   Refills:  3         These medicines have changed or have updated prescriptions.        Dose/Directions    * clindamycin 1 % external gel   Commonly known as:  CLINDAMAX   This may have changed:  Another medication with the same name was added. Make sure you understand how and when to take each.   Changed by:  Tong Blakely MD        APPLY A THIN LAYER TO AFFECTED AREA TWICE DAILY.   Refills:  0       * clindamycin 1 % external lotion   Commonly known as:  CLEOCIN T   This may have changed:  You were already taking a medication with the same name, and this  prescription was added. Make sure you understand how and when to take each.   Used for:  Acne, unspecified acne type   Changed by:  Tong Blakely MD        Apply to entire face after washing with benzoyl peroxide wash daily.   Quantity:  60 mL   Refills:  5       minocycline 100 MG capsule   Commonly known as:  MINOCIN/DYNACIN   This may have changed:  See the new instructions.   Used for:  Acne, unspecified acne type, Other acne   Changed by:  Tong Blakely MD        TAKE 1 CAPSULE BY MOUTH TWICE A DAY FOR 2 WEEKS, then once daily.   Quantity:  105 capsule   Refills:  1       * Notice:  This list has 2 medication(s) that are the same as other medications prescribed for you. Read the directions carefully, and ask your doctor or other care provider to review them with you.         Where to get your medicines      These medications were sent to [address]  [address] [place] [address]     Phone:  [phone]     benzoyl peroxide 5 % external liquid    clindamycin 1 % external lotion    minocycline 100 MG capsule    PROBIOTIC DAILY Caps                Primary Care Provider Office Phone # Fax #    Rima Mccollum, ISH [phone]       [address] [place] [address]        Equal Access to Services     HERLINDA QUEZADA AH: Hadii patrick ku hadasho Soomaali, waaxda luqadaha, qaybta kaalmada adeegyada, waxay miko haypolo telles. So [facility] [phone].    ATENCIÓN: Si habla español, tiene a javier disposición servicios gratuitos de asistencia lingüística. Llame al [phone].    We comply with applicable federal civil rights laws and [place] laws. We do not discriminate on the basis of race, color, national origin, age, disability, sex, sexual orientation, or gender identity.            Thank you!     Thank you for choosing [facility]  for your care. Our  goal is always to provide you with excellent care. Hearing back from our patients is one way we can continue to improve our services. Please take a few minutes to complete the written survey that you may receive in the mail after your visit with us. Thank you!             Your Updated Medication List - Protect others around you: Learn how to safely use, store and throw away your medicines at www.disposemymeds.org.          This list is accurate as of 11/29/18 11:33 AM.  Always use your most recent med list.                   Brand Name Dispense Instructions for use Diagnosis    amoxicillin-clavulanate 875-125 MG tablet    AUGMENTIN     TAKE 1 TABLET BY MOUTH TWICE A DAY FOR 10 DAYS        benzoyl peroxide 5 % external liquid     226 g    Apply to entire face and then wash off after 5 min daily. Then apply topical clindamycin. May bleach towels.    Other acne       buPROPion 150 MG 12 hr tablet    WELLBUTRIN SR    180 tablet    Take 1 tablet once daily and increase to 1 tablet twice daily after 4 to 7 days    Tobacco use disorder       calcium carbonate 500 MG chewable tablet    TUMS     Take 2 chew tab by mouth 2 times daily as needed for heartburn        calcium Citrate-vitamin D 500-400 MG-UNIT Chew     60 tablet    Take 500 mg by mouth 2 times daily    Unspecified intestinal malabsorption, Status post bariatric surgery       * clindamycin 1 % external gel    CLINDAMAX     APPLY A THIN LAYER TO AFFECTED AREA TWICE DAILY.        * clindamycin 1 % external lotion    CLEOCIN T    60 mL    Apply to entire face after washing with benzoyl peroxide wash daily.    Acne, unspecified acne type       DAILY MULTIVITAMIN PO      Take  by mouth. Take 2 chewable tablets daily    Bariatric surgery status       esomeprazole 20 MG DR capsule    nexIUM    84 capsule    Take 1 capsule (20 mg) by mouth 2 times daily Take 30-60 minutes before eating.    Gastroesophageal reflux disease, esophagitis presence not specified        minocycline 100 MG capsule    MINOCIN/DYNACIN    105 capsule    TAKE 1 CAPSULE BY MOUTH TWICE A DAY FOR 2 WEEKS, then once daily.    Acne, unspecified acne type, Other acne       mometasone 50 MCG/ACT nasal spray    NASONEX     Spray 2 sprays into both nostrils daily        PARoxetine 40 MG tablet    PAXIL    45 tablet    Take 0.5 tablets (20 mg) by mouth At Bedtime    Anxiety       PROBIOTIC DAILY Caps     30 capsule    Take 1 capsule by mouth daily While on antibiotics.    Other acne, Acne, unspecified acne type       UNJURY Powd     2 Bottle    Take 21 g by mouth daily Unjury 21 gram-100 kcal/27 gram Powder    Bariatric surgery status       VITAMIN B 12 PO      Take 500 mcg by mouth daily        * Notice:  This list has 2 medication(s) that are the same as other medications prescribed for you. Read the directions carefully, and ask your doctor or other care provider to review them with you.

## 2018-11-29 NOTE — PROGRESS NOTES
Chief Complaint   Patient presents with     COMPREHENSIVE EYE EXAM      Accompanied by self  Last Eye Exam: 1 year ago/[facility]  Dilated Previously: Yes    What are you currently using to see?  Glasses-2 years old. Computer glasses od -0.25 + 1.25 x 081 os -0.50 +1.00 x 113- patient got glasses are [employer].     Distance Vision Acuity: Noticed gradual change in both eyes    Near Vision Acuity: Not satisfied     Eye Comfort: good  Do you use eye drops? : No  Occupation or Hobbies: insurance/referrals    Rachel Salas, Optometric Tech          Medical, surgical and family histories reviewed and updated 11/29/2018.       OBJECTIVE: See Ophthalmology exam    ASSESSMENT:    ICD-10-CM    1. Encounter for examination of eyes and vision without abnormal findings Z01.00    2. Myopia of both eyes H52.13    3. Regular astigmatism of both eyes H52.223    4. Presbyopia H52.4       PLAN:     Patient Instructions   Yvonne was advised of today's exam findings.  Fill glasses prescription  Allow 2 weeks to adapt to change in glasses  Wear glasses full time  Copy of glasses Rx provided today. Distance/Reading progressive bifocal and Computer/Reading progressive bifocal Rx provided today.   Return in 1-2 years for eye exam, or sooner if needed.    The affects of the dilating drops last for 4- 6 hours.  You will be more sensitive to light and vision will be blurry up close.  Mydriatic sunglasses were given if needed.      Jason Becker O.D.  [address] [place]  [address]    [phone]

## 2018-11-29 NOTE — MR AVS SNAPSHOT
After Visit Summary   11/29/2018    Yvonne Zarco    MRN: [id number]           Patient Information     Date Of Birth          1975        Visit Information        Provider Department      11/29/2018 9:20 AM Tong Antunez MD [facility]        Today's Diagnoses     Acne, unspecified acne type        Other acne          Care Instructions    [facility]    If you have any questions regarding to your visit please contact your care team:       Team Purple:   Clinic Hours Telephone Number   Dr. Catalina Antunez   7am-7pm  Monday - Thursday   7am-5pm  Fridays  [phone]  (Appointment scheduling available 24/7)   Urgent Care - [place] - 11am-9pm Monday-Friday Saturday-Sunday- 9am-5pm   [place] - 5pm-9pm Monday-Friday Saturday-Sunday- 9am-5pm  [phone] - [place]  [phone] - [place]       What options do I have for a visit other than an office visit? We offer electronic visits (e-visits) and telephone visits, when medically appropriate.  Please check with your medical insurance to see if these types of visits are covered, as you will be responsible for any charges that are not paid by your insurance.      You can use [employer] (secure electronic communication) to access to your chart, send your primary care provider a message, or make an appointment. Ask a team member how to get started.     For a price quote for your services, please call our Consumer Price Line at [phone] or our Imaging Cost estimation line at [phone] (for imaging tests).              Follow-ups after your visit        Who to contact     If you have questions or need follow up information about today's clinic visit or your schedule please contact [facility] directly at [phone].  Normal or non-critical lab and imaging results will be communicated to you by [employer], letter or  phone within 4 business days after the clinic has received the results. If you do not hear from us within 7 days, please contact the clinic through [employer] or phone. If you have a critical or abnormal lab result, we will notify you by phone as soon as possible.  Submit refill requests through [employer] or call your pharmacy and they will forward the refill request to us. Please allow 3 business days for your refill to be completed.          Additional Information About Your Visit        [employer] Information     [employer] gives you secure access to your electronic health record. If you see a primary care provider, you can also send messages to your care team and make appointments. If you have questions, please call your primary care clinic.  If you do not have a primary care provider, please call [phone] and they will assist you.        Care EveryWhere ID     This is your Care EveryWhere ID. This could be used by other organizations to access your [place] medical records  [id number]        Your Vitals Were     Pulse Temperature Respirations Pulse Oximetry BMI (Body Mass Index)       105 99.3  F (37.4  C) (Oral) 16 93% 29.66 kg/m2        Blood Pressure from Last 3 Encounters:   11/29/18 118/78   11/07/18 128/84   05/31/18 104/65    Weight from Last 3 Encounters:   11/29/18 157 lb (71.2 kg)   11/07/18 159 lb (72.1 kg)   05/31/18 154 lb 1.6 oz (69.9 kg)              Today, you had the following     No orders found for display         Today's Medication Changes          These changes are accurate as of 11/29/18 10:09 AM.  If you have any questions, ask your nurse or doctor.               Start taking these medicines.        Dose/Directions    PROBIOTIC DAILY Caps   Used for:  Other acne, Acne, unspecified acne type   Started by:  Tong Blakely MD        Dose:  1 capsule   Take 1 capsule by mouth daily While on antibiotics.   Quantity:  30 capsule   Refills:  3         These medicines have changed  or have updated prescriptions.        Dose/Directions    * clindamycin 1 % external gel   Commonly known as:  CLINDAMAX   This may have changed:  Another medication with the same name was added. Make sure you understand how and when to take each.   Changed by:  Tong Blakely MD        APPLY A THIN LAYER TO AFFECTED AREA TWICE DAILY.   Refills:  0       * clindamycin 1 % external lotion   Commonly known as:  CLEOCIN T   This may have changed:  You were already taking a medication with the same name, and this prescription was added. Make sure you understand how and when to take each.   Used for:  Acne, unspecified acne type   Changed by:  Tong Blakely MD        Apply to entire face after washing with benzoyl peroxide wash daily.   Quantity:  60 mL   Refills:  5       minocycline 100 MG capsule   Commonly known as:  MINOCIN/DYNACIN   This may have changed:  See the new instructions.   Used for:  Acne, unspecified acne type, Other acne   Changed by:  Tong Blakeyl MD        TAKE 1 CAPSULE BY MOUTH TWICE A DAY FOR 2 WEEKS, then once daily.   Quantity:  105 capsule   Refills:  1       * Notice:  This list has 2 medication(s) that are the same as other medications prescribed for you. Read the directions carefully, and ask your doctor or other care provider to review them with you.         Where to get your medicines      These medications were sent to [address] [address]     Phone:  [phone]     benzoyl peroxide 5 % external liquid    clindamycin 1 % external lotion    minocycline 100 MG capsule    PROBIOTIC DAILY Caps                Primary Care Provider Office Phone # Fax #    ISH [place] [phone] [address] [facility] [address]        Equal Access to Services     JAGDISH QUEZADA AH: David cordova  Hamiltonali, navarroda luqadaha, qaybta kaalshahid matt, surjit maldonado donnapatricia larichimaryjane koki. So [facility] [phone].    ATENCIÓN: Si sheela spear, tiene a javier disposición servicios gratuitos de asistencia lingüística. Masha al [phone].    We comply with applicable federal civil rights laws and [place] laws. We do not discriminate on the basis of race, color, national origin, age, disability, sex, sexual orientation, or gender identity.            Thank you!     Thank you for choosing [facility]  for your care. Our goal is always to provide you with excellent care. Hearing back from our patients is one way we can continue to improve our services. Please take a few minutes to complete the written survey that you may receive in the mail after your visit with us. Thank you!             Your Updated Medication List - Protect others around you: Learn how to safely use, store and throw away your medicines at www.disposemymeds.org.          This list is accurate as of 11/29/18 10:09 AM.  Always use your most recent med list.                   Brand Name Dispense Instructions for use Diagnosis    amoxicillin-clavulanate 875-125 MG tablet    AUGMENTIN     TAKE 1 TABLET BY MOUTH TWICE A DAY FOR 10 DAYS        benzoyl peroxide 5 % external liquid     226 g    Apply to entire face and then wash off after 5 min daily. Then apply topical clindamycin. May bleach towels.    Other acne       buPROPion 150 MG 12 hr tablet    WELLBUTRIN SR    180 tablet    Take 1 tablet once daily and increase to 1 tablet twice daily after 4 to 7 days    Tobacco use disorder       calcium carbonate 500 MG chewable tablet    TUMS     Take 2 chew tab by mouth 2 times daily as needed for heartburn        calcium Citrate-vitamin D 500-400 MG-UNIT Chew     60 tablet    Take 500 mg by mouth 2 times daily    Unspecified intestinal malabsorption, Status post bariatric surgery       * clindamycin 1 % external gel    CLINDAMAX     APPLY A  THIN LAYER TO AFFECTED AREA TWICE DAILY.        * clindamycin 1 % external lotion    CLEOCIN T    60 mL    Apply to entire face after washing with benzoyl peroxide wash daily.    Acne, unspecified acne type       DAILY MULTIVITAMIN PO      Take  by mouth. Take 2 chewable tablets daily    Bariatric surgery status       esomeprazole 20 MG DR capsule    nexIUM    84 capsule    Take 1 capsule (20 mg) by mouth 2 times daily Take 30-60 minutes before eating.    Gastroesophageal reflux disease, esophagitis presence not specified       minocycline 100 MG capsule    MINOCIN/DYNACIN    105 capsule    TAKE 1 CAPSULE BY MOUTH TWICE A DAY FOR 2 WEEKS, then once daily.    Acne, unspecified acne type, Other acne       mometasone 50 MCG/ACT nasal spray    NASONEX     Spray 2 sprays into both nostrils daily        PARoxetine 40 MG tablet    PAXIL    45 tablet    Take 0.5 tablets (20 mg) by mouth At Bedtime    Anxiety       PROBIOTIC DAILY Caps     30 capsule    Take 1 capsule by mouth daily While on antibiotics.    Other acne, Acne, unspecified acne type       UNJURY Powd     2 Bottle    Take 21 g by mouth daily Unjury 21 gram-100 kcal/27 gram Powder    Bariatric surgery status       VITAMIN B 12 PO      Take 500 mcg by mouth daily        * Notice:  This list has 2 medication(s) that are the same as other medications prescribed for you. Read the directions carefully, and ask your doctor or other care provider to review them with you.

## 2018-11-29 NOTE — PATIENT INSTRUCTIONS
Yvonne was advised of today's exam findings.  Fill glasses prescription  Allow 2 weeks to adapt to change in glasses  Wear glasses full time  Copy of glasses Rx provided today. Distance/Reading progressive bifocal and Computer/Reading progressive bifocal Rx provided today.   Return in 1-2 years for eye exam, or sooner if needed.    The affects of the dilating drops last for 4- 6 hours.  You will be more sensitive to light and vision will be blurry up close.  Mydriatic sunglasses were given if needed.      Jason Becker O.D.  [address] [place]  [address]    [phone]

## 2018-11-29 NOTE — PATIENT INSTRUCTIONS
[facility] [place]    If you have any questions regarding to your visit please contact your care team:       Team Purple:   Clinic Hours Telephone Number   Dr. Catalina Antunez   7am-7pm  Monday - Thursday   7am-5pm  Fridays  [phone]  (Appointment scheduling available 24/7)   Urgent Care - [place] [facility] - 11am-9pm Monday-Friday Saturday-Sunday- 9am-5pm   [place] - 5pm-9pm Monday-Friday Saturday-Sunday- 9am-5pm  [phone] - [place]  [phone] - [place]       What options do I have for a visit other than an office visit? We offer electronic visits (e-visits) and telephone visits, when medically appropriate.  Please check with your medical insurance to see if these types of visits are covered, as you will be responsible for any charges that are not paid by your insurance.      You can use [employer] (secure electronic communication) to access to your chart, send your primary care provider a message, or make an appointment. Ask a team member how to get started.     For a price quote for your services, please call our Consumer Price Line at [phone] or our Imaging Cost estimation line at [phone] (for imaging tests).

## 2018-12-10 ENCOUNTER — E-VISIT (OUTPATIENT)
Dept: FAMILY MEDICINE | Facility: CLINIC | Age: 43
End: 2018-12-10
Payer: COMMERCIAL

## 2018-12-10 DIAGNOSIS — J32.9 SINUSITIS, UNSPECIFIED CHRONICITY, UNSPECIFIED LOCATION: Primary | ICD-10-CM

## 2018-12-10 PROCEDURE — 99444 ZZC PHYSICIAN ONLINE EVALUATION & MANAGEMENT SERVICE: CPT | Performed by: FAMILY MEDICINE

## 2018-12-18 ENCOUNTER — E-VISIT (OUTPATIENT)
Dept: FAMILY MEDICINE | Facility: CLINIC | Age: 43
End: 2018-12-18
Payer: COMMERCIAL

## 2018-12-18 DIAGNOSIS — J32.9 SINUSITIS, UNSPECIFIED CHRONICITY, UNSPECIFIED LOCATION: Primary | ICD-10-CM

## 2018-12-18 PROCEDURE — 99444 ZZC PHYSICIAN ONLINE EVALUATION & MANAGEMENT SERVICE: CPT | Performed by: FAMILY MEDICINE

## 2018-12-18 RX ORDER — AZITHROMYCIN 250 MG/1
TABLET, FILM COATED ORAL
Qty: 6 TABLET | Refills: 0 | Status: SHIPPED | OUTPATIENT
Start: 2018-12-18 | End: 2019-05-20

## 2019-05-09 ENCOUNTER — TRANSFERRED RECORDS (OUTPATIENT)
Dept: HEALTH INFORMATION MANAGEMENT | Facility: CLINIC | Age: 44
End: 2019-05-09

## 2019-05-13 ENCOUNTER — TELEPHONE (OUTPATIENT)
Dept: OTHER | Facility: CLINIC | Age: 44
End: 2019-05-13

## 2019-05-13 NOTE — TELEPHONE ENCOUNTER
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    CTA abd/pelvis 5/9/19 in [facility] from [facility].     [facility] called and pushing imaging to PACS.     Pt needs to be scheduled for consult with vascular surgery.  Will route to scheduling to coordinate an appointment at next available.    REBECA De LeonN, RN

## 2019-05-13 NOTE — TELEPHONE ENCOUNTER
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  Patient is referring herself to us as Gab[facility] is out of her network.  Please call her at [phone] if there's any questions - its ok to [facility] on that number.

## 2019-05-13 NOTE — TELEPHONE ENCOUNTER
sched 5/20/19 with Dr Fleming. Wanted [facility] or [place] only. Elina Ace -  at [facility]

## 2019-05-20 ENCOUNTER — OFFICE VISIT (OUTPATIENT)
Dept: VASCULAR SURGERY | Facility: CLINIC | Age: 44
End: 2019-05-20
Payer: COMMERCIAL

## 2019-05-20 VITALS — HEART RATE: 88 BPM | OXYGEN SATURATION: 98 % | SYSTOLIC BLOOD PRESSURE: 128 MMHG | DIASTOLIC BLOOD PRESSURE: 74 MMHG

## 2019-05-20 DIAGNOSIS — I77.79 CELIAC ARTERY DISSECTION (H): Primary | ICD-10-CM

## 2019-05-20 PROCEDURE — 99243 OFF/OP CNSLTJ NEW/EST LOW 30: CPT | Performed by: SURGERY

## 2019-05-20 RX ORDER — METOPROLOL SUCCINATE 25 MG/1
25 TABLET, EXTENDED RELEASE ORAL DAILY
Qty: 30 TABLET | Refills: 3 | Status: SHIPPED | OUTPATIENT
Start: 2019-05-20 | End: 2019-10-03

## 2019-05-20 RX ORDER — LORATADINE 10 MG/1
10 TABLET ORAL
COMMUNITY
End: 2019-10-03

## 2019-05-20 RX ORDER — CLOPIDOGREL BISULFATE 75 MG/1
75 TABLET ORAL
COMMUNITY
Start: 2019-05-10 | End: 2019-10-03

## 2019-05-20 RX ORDER — CLOPIDOGREL BISULFATE 75 MG/1
75 TABLET ORAL DAILY
Qty: 30 TABLET | Refills: 3 | Status: SHIPPED | OUTPATIENT
Start: 2019-05-20 | End: 2019-10-03

## 2019-05-20 RX ORDER — OXYCODONE AND ACETAMINOPHEN 5; 325 MG/1; MG/1
1 TABLET ORAL
COMMUNITY
Start: 2019-05-11 | End: 2019-10-03

## 2019-05-20 RX ORDER — METOPROLOL SUCCINATE 25 MG/1
25 TABLET, EXTENDED RELEASE ORAL
COMMUNITY
Start: 2019-05-11 | End: 2019-10-03

## 2019-05-20 NOTE — LETTER
[facility] [address]  [place] [address]  Phone: [phone]  Fax: [phone]      May 20, 2019       Re: Yvonne Zarco - 1975    I have been asked to Yvonne Zarco  In consultation for a celiac artery aneurysm/pseudoaneurysm.  I obtained most of the history from the patient.  On 2019 she started experiencing right lower quadrant abdominal pain.  Eventually the pain radiated into the epigastrium.  She went to the emergency department where she underwent a CT scan of the abdomen and pelvis.  This showed some fluid around the celiac axis.  She then went to [facility].  I do not have the records from [facility].  But it sounds like that the clinic visit had limited utility.  And she went back to the emergency department on 9 May and underwent a CT angiogram of the abdomen and pelvis.  All this when she had been having epigastric abdominal pain.  It was not related to eating.  She cannot think of any particular event that led to the pain.  She had not had any trauma or emesis or any inciting event that she finds relevant.     She has never had pain like this before.  She also does not describe any other systemic symptoms such as flu like symptoms or weight loss or rash or arthritis.     I was able to review the CT angiogram of the abdomen and pelvis that was performed on the ninth of this month.  The images of the scan performed on 2019 have not been pushed over into our radiology system and hence are not available to me.      In review of the imaging the origin of the celiac axis is patent.  Just at the point where the left splenic artery takeoff there is a focal outpouching that measured 5 x 9 mm.  There is also ill-defined thickening surrounding the celiac artery.  There is good flow in the common hepatic artery.  Interestingly, the superior mesenteric arteries also patent but does not have any tissue thickening  around it.     In light of the findings she was started on metoprolol and clopidogrel.  She cannot take aspirin. She is currently not on any pain medication.     On my examination she does not have any abdominal pain or tenderness.     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  Blood work for inflammatory markers was also performed and was negative.  I explained to her that there are 3 options.  Option 1: Exploratory laparotomy with ligation of the celiac axis and splenic artery with  bypass to the common hepatic artery.  I expect there would be a lot of inflammation in the abdomen which would make this for a very challenging operation.  Option 2: Coil embolized the splenic artery and put a stent graft from the celiac axis into the common hepatic artery.  Again, due to the inflammation I would be concerned about the long-term patency of the stent in the small artery in that location.  Option 3: Continue on metoprolol and Plavix and reimage in 3 months.     I believe the best option would be option 3 very well keep this under active surveillance.  She also agrees with the plan.  We will see her back in the clinic with CT angiogram of the abdomen and pelvis in 3 months time.  In the meantime I will also try to get the actual images of the CT scan of the abdomen and pelvis that was performed on the 30 April 2019.    JINNY JIEMNEZ MD

## 2019-05-20 NOTE — PROGRESS NOTES
I have been asked to Yvonne Zarco  In consultation for a celiac artery aneurysm/pseudoaneurysm.  I obtained most of the history from the patient.  On 30 April 2019 she started experiencing right lower quadrant abdominal pain.  Eventually the pain radiated into the epigastrium.  She went to the emergency department where she underwent a CT scan of the abdomen and pelvis.  This showed some fluid around the celiac axis.  She then went to [facility].  I do not have the records from [facility].  But it sounds like that the clinic visit had limited utility.  And she went back to the emergency department on 9 May and underwent a CT angiogram of the abdomen and pelvis.  All this when she had been having epigastric abdominal pain.  It was not related to eating.  She cannot think of any particular event that led to the pain.  She had not had any trauma or emesis or any inciting event that she finds relevant.    She has never had pain like this before.  She also does not describe any other systemic symptoms such as flu like symptoms or weight loss or rash or arthritis.    I was able to review the CT angiogram of the abdomen and pelvis that was performed on the ninth of this month.  The images of the scan performed on 30 April 2019 have not been pushed over into our radiology system and hence are not available to me.     In review of the imaging the origin of the celiac axis is patent.  Just at the point where the left splenic artery takeoff there is a focal outpouching that measured 5 x 9 mm.  There is also ill-defined thickening surrounding the celiac artery.  There is good flow in the common hepatic artery.  Interestingly, the superior mesenteric arteries also patent but does not have any tissue thickening around it.    In light of the findings she was started on metoprolol and clopidogrel.  She cannot take aspirin.  She is currently not on any pain medication.    On my examination she does not  have any abdominal pain or tenderness.    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  Blood work for inflammatory markers was also performed and was negative.  I explained to her that there are 3 options.  Option 1: Exploratory laparotomy with ligation of the celiac axis and splenic artery with  bypass to the common hepatic artery.  I expect there would be a lot of inflammation in the abdomen which would make this for a very challenging operation.  Option 2: Coil embolized the splenic artery and put a stent graft from the celiac axis into the common hepatic artery.  Again, due to the inflammation I would be concerned about the long-term patency of the stent in the small artery in that location.  Option 3: Continue on metoprolol and Plavix and reimage in 3 months.    I believe the best option would be option 3 very well keep this under active surveillance.  She also agrees with the plan.  We will see her back in the clinic with CT angiogram of the abdomen and pelvis in 3 months time.  In the meantime I will also try to get the actual images of the CT scan of the abdomen and pelvis that was performed on the 30 April 2019.

## 2019-05-23 DIAGNOSIS — I77.79 CELIAC ARTERY DISSECTION (H): Primary | ICD-10-CM

## 2019-07-18 ENCOUNTER — OFFICE VISIT (OUTPATIENT)
Dept: FAMILY MEDICINE | Facility: CLINIC | Age: 44
End: 2019-07-18
Payer: COMMERCIAL

## 2019-07-18 VITALS
HEART RATE: 96 BPM | RESPIRATION RATE: 16 BRPM | TEMPERATURE: 98.6 F | DIASTOLIC BLOOD PRESSURE: 78 MMHG | HEIGHT: 61 IN | WEIGHT: 155.5 LBS | BODY MASS INDEX: 29.36 KG/M2 | SYSTOLIC BLOOD PRESSURE: 120 MMHG | OXYGEN SATURATION: 97 %

## 2019-07-18 DIAGNOSIS — L23.9 ALLERGIC DERMATITIS: Primary | ICD-10-CM

## 2019-07-18 PROCEDURE — 99214 OFFICE O/P EST MOD 30 MIN: CPT | Performed by: FAMILY MEDICINE

## 2019-07-18 RX ORDER — HYDROXYZINE HYDROCHLORIDE 10 MG/1
10 TABLET, FILM COATED ORAL 3 TIMES DAILY PRN
Qty: 30 TABLET | Refills: 1 | Status: SHIPPED | OUTPATIENT
Start: 2019-07-18 | End: 2019-10-03

## 2019-07-18 RX ORDER — MONTELUKAST SODIUM 10 MG/1
10 TABLET ORAL AT BEDTIME
Qty: 30 TABLET | Refills: 0 | Status: SHIPPED | OUTPATIENT
Start: 2019-07-18 | End: 2019-10-03

## 2019-07-18 RX ORDER — BETAMETHASONE DIPROPIONATE 0.5 MG/G
OINTMENT, AUGMENTED TOPICAL 2 TIMES DAILY
Qty: 45 G | Refills: 1 | Status: SHIPPED | OUTPATIENT
Start: 2019-07-18 | End: 2020-01-08

## 2019-07-18 ASSESSMENT — MIFFLIN-ST. JEOR: SCORE: 1278.71

## 2019-07-18 NOTE — PROGRESS NOTES
"Subjective     Yvonne Zarco is a 44 year old female who presents to clinic today for the following health issues:    HPI   Rash      Duration: 17 days    Description  Location: Abdomen   Itching: moderate to severe    Intensity: moderate to severe    Accompanying signs and symptoms: none    History (similar episodes/previous evaluation): yes seen at [facility]    Precipitating or alleviating factors:  New exposures:  New medications started in May, but nothing new since.  Recent travel: no      Therapies tried and outcome: Lamisil started by [facility] has not helped. Taking Benadryl at bedtime to help with itching.     Patient went camping July 1st, thinks she may have been bitten by an insect, rash is very itchy, spread across lower abdomen  Went to [facility] 4 days ago, dx with candida infection, using lamisil topical 3x per day and hasn't helped much.  Itchiness comes and goes.  Hydrocortisone cream helped with itchiness.  Patient states that when she scratches, hives appear.     Of note, patient was diagnosed with celiac artery aneurysm with dissection and was placed on plavix and metoprolol recently.    BP Readings from Last 3 Encounters:   07/18/19 120/78   05/20/19 128/74   11/29/18 118/78    Wt Readings from Last 3 Encounters:   07/18/19 70.5 kg (155 lb 8 oz)   11/29/18 71.2 kg (157 lb)   11/07/18 72.1 kg (159 lb)        Reviewed and updated as needed this visit by Provider  Tobacco  Allergies  Meds  Problems  Med Hx  Surg Hx  Fam Hx         Review of Systems   ROS COMP: Constitutional, HEENT, cardiovascular, pulmonary, gi and gu systems are negative, except as otherwise noted.      Objective    /78   Pulse 96   Temp 98.6  F (37  C) (Oral)   Resp 16   Ht 1.543 m (5' 0.75\")   Wt 70.5 kg (155 lb 8 oz)   SpO2 97%   BMI 29.63 kg/m     Physical Exam   GENERAL: healthy, alert and no distress  EYES: Eyes grossly normal to inspection, PERRL and conjunctivae and sclerae normal  NECK: " no adenopathy, no asymmetry, masses, or scars and thyroid normal to palpation  ABDOMEN: soft, nontender, no hepatosplenomegaly, no masses and bowel sounds normal  SKIN: region of erythematous macular lesions on lower abdomen, light scaling  PSYCH: mentation appears normal, affect normal/bright          Assessment & Plan       ICD-10-CM    1. Allergic dermatitis L23.9 augmented betamethasone dipropionate (DIPROLENE-AF) 0.05 % external ointment     montelukast (SINGULAIR) 10 MG tablet     DERMATOLOGY REFERRAL     hydrOXYzine (ATARAX) 10 MG tablet     Patient Instructions   Yvonne       From your history and physical it seems like your rash is due to allergic dermatitis.  I'll prescribe betamethasone cream to apply 1-2x per day.  Also take singulair twice per day.  I have placed a referral to dermatology to get an assessment in the event that this treatment regimen isn't effective.  Let me know if you have questions.    Tong Antunez MD      Allergic dermatitis is most likely given that she has wheal/flare lesions when scratching as well as the fact that she noticed a bite while camping, however fungal infection is possible as well.  Will give betamethasone topical, singulair, atarax for itchiness.  Will place dermatology referral if symptoms don't resolve.      Follow up if symptoms worsen or fail to improve.   Tong Antunez MD  [facility]

## 2019-07-18 NOTE — PATIENT INSTRUCTIONS
Yvonne       From your history and physical it seems like your rash is due to allergic dermatitis.  I'll prescribe betamethasone cream to apply 1-2x per day.  Also take singulair twice per day.  I have placed a referral to dermatology to get an assessment in the event that this treatment regimen isn't effective.  Let me know if you have questions.    Tong Antunez MD

## 2019-08-20 ENCOUNTER — ANCILLARY PROCEDURE (OUTPATIENT)
Dept: CT IMAGING | Facility: CLINIC | Age: 44
End: 2019-08-20
Attending: SURGERY
Payer: COMMERCIAL

## 2019-08-20 DIAGNOSIS — I77.79 CELIAC ARTERY DISSECTION (H): ICD-10-CM

## 2019-08-20 PROCEDURE — 74174 CTA ABD&PLVS W/CONTRAST: CPT | Mod: TC

## 2019-08-20 RX ORDER — IOPAMIDOL 755 MG/ML
100 INJECTION, SOLUTION INTRAVASCULAR ONCE
Status: COMPLETED | OUTPATIENT
Start: 2019-08-20 | End: 2019-08-20

## 2019-08-20 RX ADMIN — IOPAMIDOL 80 ML: 755 INJECTION, SOLUTION INTRAVASCULAR at 16:46

## 2019-08-20 RX ADMIN — Medication 90 ML: at 16:47

## 2019-08-27 ENCOUNTER — OFFICE VISIT (OUTPATIENT)
Dept: VASCULAR SURGERY | Facility: CLINIC | Age: 44
End: 2019-08-27
Payer: COMMERCIAL

## 2019-08-27 VITALS — SYSTOLIC BLOOD PRESSURE: 128 MMHG | DIASTOLIC BLOOD PRESSURE: 81 MMHG | RESPIRATION RATE: 16 BRPM

## 2019-08-27 DIAGNOSIS — I77.79 CELIAC ARTERY DISSECTION (H): Primary | ICD-10-CM

## 2019-08-27 PROCEDURE — 99213 OFFICE O/P EST LOW 20 MIN: CPT | Performed by: SURGERY

## 2019-08-27 RX ORDER — METOPROLOL SUCCINATE 25 MG/1
25 TABLET, EXTENDED RELEASE ORAL DAILY
Qty: 90 TABLET | Refills: 3 | Status: SHIPPED | OUTPATIENT
Start: 2019-08-27 | End: 2020-08-26

## 2019-08-27 RX ORDER — CLOPIDOGREL BISULFATE 75 MG/1
75 TABLET ORAL DAILY
Qty: 90 TABLET | Refills: 3 | Status: SHIPPED | OUTPATIENT
Start: 2019-08-27 | End: 2020-05-08

## 2019-08-27 NOTE — NURSING NOTE
"Initial /81 (BP Location: Left arm, Patient Position: Chair, Cuff Size: Adult Regular)   Resp 16  Estimated body mass index is 29.63 kg/m  as calculated from the following:    Height as of 7/18/19: 1.543 m (5' 0.75\").    Weight as of 7/18/19: 70.5 kg (155 lb 8 oz). .    Patient is here for ct results.  lauri carrasquillo LPN    "

## 2019-08-27 NOTE — PROGRESS NOTES
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  She has had symptoms of epigastric pain at the time.    She tells me that those symptoms have resolved.    She also reported a myriad of other symptoms such as palpitations, reflux and dizziness.  I clearly explained that have nothing to do with her celiac artery dissection.  I personally reviewed the CT angiogram of the abdomen and pelvis that was performed.    CTA ABDOMEN AND PELVIS WITH CONTRAST   8/20/2019 7:11 PM      HISTORY: History of celiac artery dissection. Celiac artery dissection  (H).     TECHNIQUE: CTA abdomen and pelvis with 80mL [id number]-370 IV. Radiation  dose for this scan was reduced using automated exposure control,  adjustment of the mA and/or kV according to patient size, or iterative  reconstruction technique. 3-D images were created at an independent  workstation under concurrent supervision at the request of the  ordering provider.     COMPARISON: Outside CT 5/9/2019     FINDINGS:   Lung bases: Lung bases are clear. No pleural effusion or pericardial  effusion. Moderate hiatal hernia. Surgical changes are seen along the  stomach.     Abdominal vasculature: Again noted, there is an focal outpouching off  the celiac axis just before the bifurcation of the splenic and common  hepatic arteries measuring 0.8 x 0.6 cm, consistent with  pseudoaneurysm. There is persistent soft tissue surrounding the celiac  origin and proximal common hepatic artery, slightly improved when  compared to the prior study. Bilateral renal arteries, superior  mesenteric and inferior mesenteric arteries are patent. Abdominal  aorta is patent without abdominal aortic aneurysm. The spleen,  kidneys, adrenal glands, liver, and pancreas show no focal  abnormality. No intrahepatic or extrahepatic biliary dilatation. No  intraperitoneal free air or free fluid. No dilated loops of small or  large bowel. Bladder is normal. No abdominal or  pelvic  lymphadenopathy.     Bones: No suspicious bony lesions.                                                                      IMPRESSION:  1. No significant change in outpouching off the celiac axis measuring  0.8 x 0.6 cm, consistent with pseudoaneurysm. Slight improvement in  vessel wall thickening around the celiac axis and common hepatic  artery, suggesting vasculitis that is improving.  2. Postoperative changes of cholecystectomy.     DEONDRE BAUMAN, DO    I reassured her of the findings of the CT angiogram.  There is significantly less inflammation along the celiac artery.  He is to continue on metoprolol and Plavix.  I will see her back in 6 months with repeat CT angiogram of the abdomen and pelvis for surveillance.  If there are no changes then we will repeat it in 1 years time.

## 2019-09-29 ENCOUNTER — HEALTH MAINTENANCE LETTER (OUTPATIENT)
Age: 44
End: 2019-09-29

## 2019-10-02 PROBLEM — Z86.79 H/O ABDOMINAL AORTIC ANEURYSM: Status: ACTIVE | Noted: 2019-05-10

## 2019-10-02 PROBLEM — I72.8 CELIAC ARTERY ANEURYSM (H): Status: ACTIVE | Noted: 2019-05-09

## 2019-10-02 PROBLEM — M54.50 LUMBAGO: Status: RESOLVED | Noted: 2018-01-12 | Resolved: 2019-10-02

## 2019-10-03 ENCOUNTER — OFFICE VISIT (OUTPATIENT)
Dept: PODIATRY | Facility: CLINIC | Age: 44
End: 2019-10-03
Payer: COMMERCIAL

## 2019-10-03 VITALS
HEART RATE: 82 BPM | BODY MASS INDEX: 29.72 KG/M2 | DIASTOLIC BLOOD PRESSURE: 86 MMHG | WEIGHT: 156 LBS | SYSTOLIC BLOOD PRESSURE: 142 MMHG

## 2019-10-03 DIAGNOSIS — L60.0 INGROWING NAIL: Primary | ICD-10-CM

## 2019-10-03 PROCEDURE — 99203 OFFICE O/P NEW LOW 30 MIN: CPT | Performed by: PODIATRIST

## 2019-10-03 NOTE — PROGRESS NOTES
Subjective:    Pt is seen today as a new pt referral w/ the c/c of a painful ingrown right and left great nail both border.  This has been problematic for many year(s). negativehistory of drainage from the site. This is slowly getting worse.  Aggravated by activity and relieved by rest.  Has tried soaking which has not helped.   denies history of trauma to the area.  Patient goes and has pedicures to have these removed on a monthly basis.  She is wondering about a permanent treatment for these.  She says she smokes about 3 cigarettes a day and trying to quit.  She is currently on Plavix.    ROS:  A 10-point review of systems was performed and is positive for that noted in the HPI and as seen above.  All other areas are negative.          Allergies   Allergen Reactions     Ciprofloxacin Itching     Dilaudid [Hydromorphone] Itching     Amoxicillin Blisters and Rash       Current Outpatient Medications   Medication Sig Dispense Refill     augmented betamethasone dipropionate (DIPROLENE-AF) 0.05 % external ointment Apply topically 2 times daily 45 g 1     buPROPion (WELLBUTRIN SR) 150 MG 12 hr tablet Take 1 tablet once daily and increase to 1 tablet twice daily after 4 to 7 days 180 tablet 3     calcium carbonate (TUMS) 500 MG chewable tablet Take 2 chew tab by mouth 2 times daily as needed for heartburn       calcium Citrate-vitamin D 500-400 MG-UNIT CHEW Take 500 mg by mouth 2 times daily 60 tablet prn     clopidogrel (PLAVIX) 75 MG tablet Take 1 tablet (75 mg) by mouth daily 90 tablet 3     Cyanocobalamin (VITAMIN B 12 PO) Take 500 mcg by mouth daily        esomeprazole (NEXIUM) 20 MG CR capsule Take 1 capsule (20 mg) by mouth 2 times daily Take 30-60 minutes before eating. 84 capsule 8     metoprolol succinate ER (TOPROL-XL) 25 MG 24 hr tablet Take 1 tablet (25 mg) by mouth daily 90 tablet 3     mometasone (NASONEX) 50 MCG/ACT spray Spray 2 sprays into both nostrils daily       Multiple Vitamin (DAILY MULTIVITAMIN  PO) Take  by mouth. Take 2 chewable tablets daily       PARoxetine (PAXIL) 40 MG tablet Take 0.5 tablets (20 mg) by mouth At Bedtime 45 tablet 3     Protein (UNJURY) POWD Take 21 g by mouth daily Unjury 21 gram-100 kcal/27 gram Powder 2 Bottle 11       Patient Active Problem List   Diagnosis     GERD (gastroesophageal reflux disease)     Bariatric surgery status     Anxiety     Tobacco use disorder     Complex cyst of left ovary     Osteopenia, unspecified location     Celiac artery aneurysm (H)       Past Medical History:   Diagnosis Date     Depressive disorder forever     Endometriosis     found at time of abdominal hysterectomy     Gastro-oesophageal reflux disease      Hiatal hernia      Hypertension      PONV (postoperative nausea and vomiting)        Past Surgical History:   Procedure Laterality Date      SECTION      x2     CHOLECYSTECTOMY       ESOPHAGOSCOPY, GASTROSCOPY, DUODENOSCOPY (EGD), COMBINED  2012    Procedure: COMBINED ESOPHAGOSCOPY, GASTROSCOPY, DUODENOSCOPY (EGD), BIOPSY SINGLE OR MULTIPLE;;  Surgeon: Scott Seth MD;  Location: [facility]     GENITOURINARY SURGERY  ??    bladder sling with hysterectomy early      GYN SURGERY      tubal ligation     hysterectomy with bladder sling      left ovaries     HYSTERECTOMY, PAP NO LONGER INDICATED      Abdominal hyst     LAPAROSCOPIC GASTRIC SLEEVE  5/10/2013    Procedure: LAPAROSCOPIC GASTRIC SLEEVE;  Laparoscopic Sleeve Gastrectomy with hiatal hernia repair;  Surgeon: Scott Seth MD;  Location: [facility]     LAPAROSCOPIC OOPHORECTOMY Left 2018    Procedure: LAPAROSCOPIC OOPHORECTOMY;  Adhesiolysis and Left Laparoscopic Oophorectomy;  Surgeon: Maritza Fowler MD;  Location: [facility]     LASIK BILATERAL       ORTHOPEDIC SURGERY      right foot surgery       Family History   Problem Relation Age of Onset     Hypertension Father              Substance Abuse Father            alcoholism     Diabetes  Mother         diet controlled     Hypertension Mother         meds     Coronary Artery Disease Mother 67        quadruple bypass     Hyperlipidemia Mother      Substance Abuse Mother         alcoholism     Obesity Mother      Hypertension Sister      Anxiety Disorder Sister      Anxiety Disorder Daughter      Anxiety Disorder Niece      Hypertension Sister         meds     Anxiety Disorder Sister         no treated     Hyperlipidemia Sister      Brain Tumor Other      Heart Failure Other      Anxiety Disorder Daughter         on meds     Anesthesia Reaction Daughter      Asthma Daughter      Anxiety Disorder Niece         on meds     Substance Abuse Son         drug addiction     Cancer No family hx of      Cerebrovascular Disease No family hx of      Thyroid Disease No family hx of      Glaucoma No family hx of      Macular Degeneration No family hx of        Social History     Tobacco Use     Smoking status: Current Some Day Smoker     Packs/day: 0.50     Years: 20.00     Pack years: 10.00     Types: Cigarettes     Smokeless tobacco: Never Used     Tobacco comment: Tried many times; 3-5cigarettes/day    Substance Use Topics     Alcohol use: No     Comment: seldom          Exam:    Vitals: BP (!) 142/86 (BP Location: Right arm)   Pulse 82   Wt 70.8 kg (156 lb)   BMI 29.72 kg/m    BMI: Body mass index is 29.72 kg/m .  Height: Data Unavailable    Constitutional/ general:  Pt is in no apparent distress, appears well-nourished.  Cooperative with history and physical exam.     Psych:  The patient answered questions appropriately.  Normal affect.  Seems to have reasonable expectations, in terms of treatment.     Eyes:  Visual scanning/ tracking without deficit.     Ears:  Response to auditory stimuli is normal.  negative hearing aid devices.  Auricles in proper alignment.     Lymphatic:  Popliteal lymph nodes not enlarged.     Lungs:  Non labored breathing, non labored speech. No cough.  No audible wheezing. Even,  quiet breathing.       Vascular:  positive pedal pulses bilaterally for both the DP and PT arteries.  CFT < 3 sec.  negative ankle edema.  positive pedal hair growth.    Neuro:  Alert and oriented x 3. Coordinated gait.  Light touch sensation is intact to the L4, L5, S1 distributions. No obvious deficits.  No evidence of neurological-based weakness, spasticity, or contracture in the lower extremities.     Derm: Normal texture and turgor.  No erythema, ecchymosis, or cyanosis.      Musculoskeletal:    Lower extremity muscle strength is normal.  Patient is ambulatory without an assistive device or brace.  Normal arch with weightbearing.  No forefoot or rear foot deformities noted.   Normal ROM all fore foot and rearfoot joints.  No equinus.  right and left great toe nail both border shows soft tissue impingement with localized erythema.   negative active drainage/purulence at this time.  No sinus tracts.  No nailbed masses or exostosis.  No pain with range of motion of IPJ or MTPJ.  No ascending cellulitis.    ASSESSMENT:    Onychocryptosis with paronychia right and left great both border.    Discussed etiology and treatment options in detail w/ the pt.  The potential causes and nature of an ingrown toenail were discussed with the patient.  We reviewed the natural history/prognosis of the condition and potential risks if no treatment is provided.      Treatment options discussed included conservative management (oral antibiotics, soaking of foot, adequate width shoes)  as well as surgical management (partial or total nail removal).  The pros and cons of both forms of treatment were reviewed.  Handout given to patient.      After thorough discussion and answering all questions, the patient elected to have permanent removal of affected nail border.  Risk complications and efficacy discussed with patient.  We will schedule I hour appointment in the future to have this done.  We discussed soaking afterwards.  The patient  works out of her house now so this should not be problematic.    Harley Carlisle, DPM DPM, FACFAS

## 2019-10-03 NOTE — PATIENT INSTRUCTIONS
Weight management plan: Patient was referred to their PCP to discuss a diet and exercise plan.  We wish you continued good healing. If you have any questions or concerns, please do not hesitate to contact us at [phone]    Please remember to call and schedule a follow up appointment if one was recommended at your earliest convenience.   PODIATRY CLINIC HOURS  TELEPHONE NUMBER    Dr. Harley Carlisle D.P.[facility]    Clinics:  [facility]    [facility]andy Chaves [facility]   Tuesday 1PM-6PM  [place]  Wednesday 7AM-2PM  [facility]  Thursday 10AM-6PM  [place]  Friday 7AM-3PM  [place]  Specialty schedulers:   [phone] to make an appointment with any Specialty Provider.        Urgent Care locations:    [facility] Monday-Friday 5 pm - 9 pm. Saturday-Sunday 9 am -5pm    Monday-Friday 11 am - 9 pm Saturday 9 am - 5 pm     Monday-Sunday 12 noon-8PM [phone]     If you need a medication refill, please contact us you may need lab work and/or a follow up visit prior to your refill (i.e. Antifungal medications).    [email or web address] (secure e-mail communication and access to your chart) to send a message or to make an appointment.    If MRI needed please call [facility] at [phone]        SMOKING CESSATION  What's in cigarette smoke? - Cigarette smoke contains over 4,000 chemicals. Nicotine is one of the main ingredients which is an insecticide/herbicide. It is poisonous to our nervous system, increases blood clotting risk, and decreases the body's defenses to fight off infection. Another chemical is Carbon Monoxide is an asphyxiating gas that permanently binds to blood cells and blocks the transport of oxygen. This leads to tissue death and decreases your metabolism. Tar is a chemical that coats your lungs and trachea which impairs new oxygen coming in and carbon dioxide getting out  of your body.   How does smoking impact surgery? - Smoking is particularly hazardous with regards to surgery. Surgery puts stress on the body and a smoker's body is already under strain from these chemicals. Putting the two together, especially for an elective surgery, could be a recipe for disaster. Smoking before and after surgery increases your risk of heart problems, slow wound healing, delayed bone healing, blood clots, wound infection and anesthesia complications.   What are the benefits of quitting? - In 20 minutes your blood pressure will drop back down to normal. In 8 hours the carbon monoxide (a toxic gas) levels in your blood stream will drop by half, and oxygen levels will return to normal. In 48 hours your chance of having a heart attack will have decreased. All nicotine will have left your body. Your sense of taste and smell will return to a normal level. In 72 hours your bronchial tubes will relax, and your energy levels will increase. In 2 weeks your circulation will increase, and it will continue to improve for the next 10 weeks.    Recommendations for elective surgery - Ideally, patients should quit smoking 8 weeks before and at least 2 weeks after elective surgery in order to avoid complications. Simply cutting back on the amount of cigarettes smoked per day does not offer any benefit or decrease the risk of poor wound healing, heart problems, and infection. Smokers should also start taking Vitamin C and B for two weeks before surgery and two weeks after surgery.    Ways to Stop Smokin. Nicotine patches, lozenges, or gum  2. Support Groups  3. Medications (see below)    List of Medications:  1. Varenicline Tartrate (CHANTIX)   2. Bupropion HCL (WELLBUTRIN, ZYBAN) - note: make sure Wellbutrin is for smoking cessation and not other issues   3. Nicotine Patch (NICODERM)   4. Nicotine Inhaler (NICOTROL)   5. Nicotine Gum Nicotine Polacrilex   6. Nicotine Lozenge: Nicotine Polacrilex (COMMIT)    * [place] offers a smoking support group as well!  Please visit: [email or web address]  If you are interested in these, ask about getting a prescription or talk to your primary care doctor about what may be the best way for you to quit.       Yvonne to follow up with Primary Care provider regarding elevated blood pressure.

## 2019-10-03 NOTE — LETTER
10/3/2019         RE: Yvonne Zarco  [address]  [place] [address]        Dear Colleague,    Thank you for referring your patient, Yvonne Zarco, to the [facility]. Please see a copy of my visit note below.    Subjective:    Pt is seen today as a new pt referral w/ the c/c of a painful ingrown right and left great nail both border.  This has been problematic for many year(s). negativehistory of drainage from the site. This is slowly getting worse.  Aggravated by activity and relieved by rest.  Has tried soaking which has not helped.   denies history of trauma to the area.  Patient goes and has pedicures to have these removed on a monthly basis.  She is wondering about a permanent treatment for these.  She says she smokes about 3 cigarettes a day and trying to quit.  She is currently on Plavix.    ROS:  A 10-point review of systems was performed and is positive for that noted in the HPI and as seen above.  All other areas are negative.          Allergies   Allergen Reactions     Ciprofloxacin Itching     Dilaudid [Hydromorphone] Itching     Amoxicillin Blisters and Rash       Current Outpatient Medications   Medication Sig Dispense Refill     augmented betamethasone dipropionate (DIPROLENE-AF) 0.05 % external ointment Apply topically 2 times daily 45 g 1     buPROPion (WELLBUTRIN SR) 150 MG 12 hr tablet Take 1 tablet once daily and increase to 1 tablet twice daily after 4 to 7 days 180 tablet 3     calcium carbonate (TUMS) 500 MG chewable tablet Take 2 chew tab by mouth 2 times daily as needed for heartburn       calcium Citrate-vitamin D 500-400 MG-UNIT CHEW Take 500 mg by mouth 2 times daily 60 tablet prn     clopidogrel (PLAVIX) 75 MG tablet Take 1 tablet (75 mg) by mouth daily 90 tablet 3     Cyanocobalamin (VITAMIN B 12 PO) Take 500 mcg by mouth daily        esomeprazole (NEXIUM) 20 MG CR capsule Take 1 capsule (20 mg) by mouth 2 times daily Take 30-60 minutes before  eating. 84 capsule 8     metoprolol succinate ER (TOPROL-XL) 25 MG 24 hr tablet Take 1 tablet (25 mg) by mouth daily 90 tablet 3     mometasone (NASONEX) 50 MCG/ACT spray Spray 2 sprays into both nostrils daily       Multiple Vitamin (DAILY MULTIVITAMIN PO) Take  by mouth. Take 2 chewable tablets daily       PARoxetine (PAXIL) 40 MG tablet Take 0.5 tablets (20 mg) by mouth At Bedtime 45 tablet 3     Protein (UNJURY) POWD Take 21 g by mouth daily Unjury 21 gram-100 kcal/27 gram Powder 2 Bottle 11       Patient Active Problem List   Diagnosis     GERD (gastroesophageal reflux disease)     Bariatric surgery status     Anxiety     Tobacco use disorder     Complex cyst of left ovary     Osteopenia, unspecified location     Celiac artery aneurysm (H)       Past Medical History:   Diagnosis Date     Depressive disorder forever     Endometriosis     found at time of abdominal hysterectomy     Gastro-oesophageal reflux disease      Hiatal hernia      Hypertension      PONV (postoperative nausea and vomiting)        Past Surgical History:   Procedure Laterality Date      SECTION      x2     CHOLECYSTECTOMY       ESOPHAGOSCOPY, GASTROSCOPY, DUODENOSCOPY (EGD), COMBINED  2012    Procedure: COMBINED ESOPHAGOSCOPY, GASTROSCOPY, DUODENOSCOPY (EGD), BIOPSY SINGLE OR MULTIPLE;;  Surgeon: Scott Seth MD;  [facility]     GENITOURINARY SURGERY  ??    bladder sling with hysterectomy early      GYN SURGERY      tubal ligation     hysterectomy with bladder sling      left ovaries     HYSTERECTOMY, PAP NO LONGER INDICATED      Abdominal hyst     LAPAROSCOPIC GASTRIC SLEEVE  5/10/2013    Procedure: LAPAROSCOPIC GASTRIC SLEEVE;  Laparoscopic Sleeve Gastrectomy with hiatal hernia repair;  Surgeon: Scott Seth MD;  [facility]     LAPAROSCOPIC OOPHORECTOMY Left 2018    Procedure: LAPAROSCOPIC OOPHORECTOMY;  Adhesiolysis and Left Laparoscopic Oophorectomy;  Surgeon: Maritza Fowler,  MD;  Location: [facility]     LASIK BILATERAL       ORTHOPEDIC SURGERY      right foot surgery       Family History   Problem Relation Age of Onset     Hypertension Father              Substance Abuse Father            alcoholism     Diabetes Mother         diet controlled     Hypertension Mother         meds     Coronary Artery Disease Mother 67        quadruple bypass     Hyperlipidemia Mother      Substance Abuse Mother         alcoholism     Obesity Mother      Hypertension Sister      Anxiety Disorder Sister      Anxiety Disorder Daughter      Anxiety Disorder Niece      Hypertension Sister         meds     Anxiety Disorder Sister         no treated     Hyperlipidemia Sister      Brain Tumor Other      Heart Failure Other      Anxiety Disorder Daughter         on meds     Anesthesia Reaction Daughter      Asthma Daughter      Anxiety Disorder Niece         on meds     Substance Abuse Son         drug addiction     Cancer No family hx of      Cerebrovascular Disease No family hx of      Thyroid Disease No family hx of      Glaucoma No family hx of      Macular Degeneration No family hx of        Social History     Tobacco Use     Smoking status: Current Some Day Smoker     Packs/day: 0.50     Years: 20.00     Pack years: 10.00     Types: Cigarettes     Smokeless tobacco: Never Used     Tobacco comment: Tried many times; 3-5cigarettes/day    Substance Use Topics     Alcohol use: No     Comment: seldom          Exam:    Vitals: BP (!) 142/86 (BP Location: Right arm)   Pulse 82   Wt 70.8 kg (156 lb)   BMI 29.72 kg/m     BMI: Body mass index is 29.72 kg/m .  Height: Data Unavailable    Constitutional/ general:  Pt is in no apparent distress, appears well-nourished.  Cooperative with history and physical exam.     Psych:  The patient answered questions appropriately.  Normal affect.  Seems to have reasonable expectations, in terms of treatment.     Eyes:  Visual scanning/ tracking without deficit.      Ears:  Response to auditory stimuli is normal.  negative hearing aid devices.  Auricles in proper alignment.     Lymphatic:  Popliteal lymph nodes not enlarged.     Lungs:  Non labored breathing, non labored speech. No cough.  No audible wheezing. Even, quiet breathing.       Vascular:  positive pedal pulses bilaterally for both the DP and PT arteries.  CFT < 3 sec.  negative ankle edema.  positive pedal hair growth.    Neuro:  Alert and oriented x 3. Coordinated gait.  Light touch sensation is intact to the L4, L5, S1 distributions. No obvious deficits.  No evidence of neurological-based weakness, spasticity, or contracture in the lower extremities.     Derm: Normal texture and turgor.  No erythema, ecchymosis, or cyanosis.      Musculoskeletal:    Lower extremity muscle strength is normal.  Patient is ambulatory without an assistive device or brace.  Normal arch with weightbearing.  No forefoot or rear foot deformities noted.   Normal ROM all fore foot and rearfoot joints.  No equinus.  right and left great toe nail both border shows soft tissue impingement with localized erythema.   negative active drainage/purulence at this time.  No sinus tracts.  No nailbed masses or exostosis.  No pain with range of motion of IPJ or MTPJ.  No ascending cellulitis.    ASSESSMENT:    Onychocryptosis with paronychia right and left great both border.    Discussed etiology and treatment options in detail w/ the pt.  The potential causes and nature of an ingrown toenail were discussed with the patient.  We reviewed the natural history/prognosis of the condition and potential risks if no treatment is provided.      Treatment options discussed included conservative management (oral antibiotics, soaking of foot, adequate width shoes)  as well as surgical management (partial or total nail removal).  The pros and cons of both forms of treatment were reviewed.  Handout given to patient.      After thorough discussion and answering  all questions, the patient elected to have permanent removal of affected nail border.  Risk complications and efficacy discussed with patient.  We will schedule I hour appointment in the future to have this done.  We discussed soaking afterwards.  The patient works out of her house now so this should not be problematic.    Harley Carlisle DPM DPM, FACFAS      Again, thank you for allowing me to participate in the care of your patient.        Sincerely,        Harley Carlisle DPM

## 2019-10-07 ENCOUNTER — E-VISIT (OUTPATIENT)
Dept: FAMILY MEDICINE | Facility: CLINIC | Age: 44
End: 2019-10-07
Payer: COMMERCIAL

## 2019-10-07 DIAGNOSIS — J06.9 UPPER RESPIRATORY TRACT INFECTION, UNSPECIFIED TYPE: Primary | ICD-10-CM

## 2019-10-07 PROCEDURE — 99444 ZZC PHYSICIAN ONLINE EVALUATION & MANAGEMENT SERVICE: CPT | Performed by: FAMILY MEDICINE

## 2019-10-08 ENCOUNTER — E-VISIT (OUTPATIENT)
Dept: FAMILY MEDICINE | Facility: CLINIC | Age: 44
End: 2019-10-08
Payer: COMMERCIAL

## 2019-10-08 ENCOUNTER — E-VISIT (OUTPATIENT)
Dept: FAMILY MEDICINE | Facility: CLINIC | Age: 44
End: 2019-10-08

## 2019-10-08 DIAGNOSIS — J06.9 UPPER RESPIRATORY TRACT INFECTION, UNSPECIFIED TYPE: Primary | ICD-10-CM

## 2019-10-08 DIAGNOSIS — R50.9 FEVER, UNSPECIFIED FEVER CAUSE: Primary | ICD-10-CM

## 2019-10-08 RX ORDER — BENZONATATE 200 MG/1
200 CAPSULE ORAL 3 TIMES DAILY PRN
Qty: 20 CAPSULE | Refills: 0 | Status: SHIPPED | OUTPATIENT
Start: 2019-10-08 | End: 2020-01-08

## 2019-10-08 NOTE — PATIENT INSTRUCTIONS
Yvonne,    Your symptoms are most consistent with a viral upper respiratory illness. Influenza is not likely, given no known cases in the state at this time. If you are having fever (temperature of 100.4 or greater), I recommend that you be seen in clinic or urgent care for further evaluation today.    Treat your symptoms with acetaminophen or ibuprofen as needed for pain, muscle aches, or chills. You can use an over-the-counter cough medication as needed. Call or return to clinic as needed if these symptoms worsen or fail to improve as anticipated in 7 to 10 days.     Ebonie Lane MD

## 2019-10-09 NOTE — TELEPHONE ENCOUNTER
Please  Cancel E visit-I have not seen any Flu cases  Have her be seen in urgent care  Cancel E visit

## 2019-10-24 ENCOUNTER — OFFICE VISIT (OUTPATIENT)
Dept: PODIATRY | Facility: CLINIC | Age: 44
End: 2019-10-24
Payer: COMMERCIAL

## 2019-10-24 VITALS
DIASTOLIC BLOOD PRESSURE: 78 MMHG | SYSTOLIC BLOOD PRESSURE: 124 MMHG | BODY MASS INDEX: 29.72 KG/M2 | HEART RATE: 99 BPM | WEIGHT: 156 LBS

## 2019-10-24 DIAGNOSIS — L60.0 INGROWING NAIL: Primary | ICD-10-CM

## 2019-10-24 PROCEDURE — 11750 EXCISION NAIL&NAIL MATRIX: CPT | Mod: T5 | Performed by: PODIATRIST

## 2019-10-24 NOTE — LETTER
10/24/2019         RE: Yvonne Zarco  [address]  [place] [address]        Dear Colleague,    Thank you for referring your patient, Yvonne Zarco, to the [facility]. Please see a copy of my visit note below.    Subjective:    Pt is seen today for ingrown great toenail.  Points to right and left first toe both border(s).  Would like a permanent today.  Denies F/C/N/V.      ROS:  No hx of wound healing problems, or numbness       Allergies   Allergen Reactions     Ciprofloxacin Itching     Dilaudid [Hydromorphone] Itching     Amoxicillin Blisters and Rash       Current Outpatient Medications   Medication Sig Dispense Refill     acetaminophen-codeine (TYLENOL #3) 300-30 MG tablet Take 1-2 tablets by mouth At Bedtime for 3 days 12 tablet 0     augmented betamethasone dipropionate (DIPROLENE-AF) 0.05 % external ointment Apply topically 2 times daily 45 g 1     benzonatate (TESSALON) 200 MG capsule Take 1 capsule (200 mg) by mouth 3 times daily as needed for cough 20 capsule 0     buPROPion (WELLBUTRIN SR) 150 MG 12 hr tablet Take 1 tablet once daily and increase to 1 tablet twice daily after 4 to 7 days 180 tablet 3     calcium carbonate (TUMS) 500 MG chewable tablet Take 2 chew tab by mouth 2 times daily as needed for heartburn       calcium Citrate-vitamin D 500-400 MG-UNIT CHEW Take 500 mg by mouth 2 times daily 60 tablet prn     clopidogrel (PLAVIX) 75 MG tablet Take 1 tablet (75 mg) by mouth daily 90 tablet 3     Cyanocobalamin (VITAMIN B 12 PO) Take 500 mcg by mouth daily        esomeprazole (NEXIUM) 20 MG CR capsule Take 1 capsule (20 mg) by mouth 2 times daily Take 30-60 minutes before eating. 84 capsule 8     metoprolol succinate ER (TOPROL-XL) 25 MG 24 hr tablet Take 1 tablet (25 mg) by mouth daily 90 tablet 3     mometasone (NASONEX) 50 MCG/ACT spray Spray 2 sprays into both nostrils daily       Multiple Vitamin (DAILY MULTIVITAMIN PO) Take  by mouth. Take 2 chewable  tablets daily       PARoxetine (PAXIL) 40 MG tablet Take 0.5 tablets (20 mg) by mouth At Bedtime 45 tablet 3     Protein (UNJURY) POWD Take 21 g by mouth daily Unjury 21 gram-100 kcal/27 gram Powder 2 Bottle 11       Patient Active Problem List   Diagnosis     GERD (gastroesophageal reflux disease)     Bariatric surgery status     Anxiety     Tobacco use disorder     Complex cyst of left ovary     Osteopenia, unspecified location     Celiac artery aneurysm (H)       Past Medical History:   Diagnosis Date     Depressive disorder forever     Endometriosis     found at time of abdominal hysterectomy     Gastro-oesophageal reflux disease      Hiatal hernia      Hypertension      PONV (postoperative nausea and vomiting)        Past Surgical History:   Procedure Laterality Date      SECTION      x2     CHOLECYSTECTOMY       ESOPHAGOSCOPY, GASTROSCOPY, DUODENOSCOPY (EGD), COMBINED  2012    Procedure: COMBINED ESOPHAGOSCOPY, GASTROSCOPY, DUODENOSCOPY (EGD), BIOPSY SINGLE OR MULTIPLE;;  Surgeon: Scott Seth MD;  [facility]     GENITOURINARY SURGERY  ??    bladder sling with hysterectomy early      GYN SURGERY      tubal ligation     hysterectomy with bladder sling      left ovaries     HYSTERECTOMY, PAP NO LONGER INDICATED      Abdominal hyst     LAPAROSCOPIC GASTRIC SLEEVE  5/10/2013    Procedure: LAPAROSCOPIC GASTRIC SLEEVE;  Laparoscopic Sleeve Gastrectomy with hiatal hernia repair;  Surgeon: Scott Seth MD;  [facility]     LAPAROSCOPIC OOPHORECTOMY Left 2018    Procedure: LAPAROSCOPIC OOPHORECTOMY;  Adhesiolysis and Left Laparoscopic Oophorectomy;  Surgeon: Maritza Fowler MD;  [facility]     LASIK BILATERAL       ORTHOPEDIC SURGERY      right foot surgery       Family History   Problem Relation Age of Onset     Hypertension Father              Substance Abuse Father            alcoholism     Diabetes Mother         diet controlled      Hypertension Mother         meds     Coronary Artery Disease Mother 67        quadruple bypass     Hyperlipidemia Mother      Substance Abuse Mother         alcoholism     Obesity Mother      Hypertension Sister      Anxiety Disorder Sister      Anxiety Disorder Daughter      Anxiety Disorder Niece      Hypertension Sister         meds     Anxiety Disorder Sister         no treated     Hyperlipidemia Sister      Brain Tumor Other      Heart Failure Other      Anxiety Disorder Daughter         on meds     Anesthesia Reaction Daughter      Asthma Daughter      Anxiety Disorder Niece         on meds     Substance Abuse Son         drug addiction     Cancer No family hx of      Cerebrovascular Disease No family hx of      Thyroid Disease No family hx of      Glaucoma No family hx of      Macular Degeneration No family hx of        Social History     Tobacco Use     Smoking status: Current Some Day Smoker     Packs/day: 0.50     Years: 20.00     Pack years: 10.00     Types: Cigarettes     Smokeless tobacco: Never Used     Tobacco comment: Tried many times; 3-5cigarettes/day    Substance Use Topics     Alcohol use: No     Comment: seldom          Objective:    /78   Pulse 99   Wt 70.8 kg (156 lb)   BMI 29.72 kg/m     Pulses are palpable +2/4 DP & PT bilateral.  Sensation to light touch is intact.  No fore foot deformities are noted.  Normal ROM all forefoot joints.  No evidence of deep abscess or infection noted.  Pain on palpation of right and left first toe both borders.  Erythema, edema, and some proud tissue noted.  Nail appears to be incurvated on the affected border.     Assessment:  Onychocryptosis with paronychia right and left first toe both border(s)    Plan:  Discussed etiology and treatment options with the pt.  The potential causes and nature of an ingrown toenail were discussed with the patient.  We reviewed the natural history/prognosis of the condition and potential risks if no treatment is  provided.      Treatment options discussed included conservative management (oral antibiotics, soaking of foot, adequate width shoes)  as well as surgical management (partial or total nail removal).  The pros and cons of both forms of treatment were reviewed.  Handout dispensed.       After thorough discussion and answering all questions, the patient elected to have permanent removal of right and left first toe both borders.  Risk complications and efficacy discussed with patient.  Obtained consent, used 3cc of 1% lidocaine plain to block aformentioned toe(s).  Sterile prep, then avulsed right and left first toe both border(s).  No evidence of deep abscess noted.  Phenol was applied times 3 at 30 second intervals with curettage in between and then alcohol rinse.  Pt tolerated procedure well.  Sterile bandage placed, gave wound care instruction.  T3 for pain.  Return to clinic prn.    Harley Carlisle DPM DPM, FACFAS           Again, thank you for allowing me to participate in the care of your patient.        Sincerely,        Harley Carlisle DPM

## 2019-10-24 NOTE — PROGRESS NOTES
Subjective:    Pt is seen today for ingrown great toenail.  Points to right and left first toe both border(s).  Would like a permanent today.  Denies F/C/N/V.      ROS:  No hx of wound healing problems, or numbness       Allergies   Allergen Reactions     Ciprofloxacin Itching     Dilaudid [Hydromorphone] Itching     Amoxicillin Blisters and Rash       Current Outpatient Medications   Medication Sig Dispense Refill     acetaminophen-codeine (TYLENOL #3) 300-30 MG tablet Take 1-2 tablets by mouth At Bedtime for 3 days 12 tablet 0     augmented betamethasone dipropionate (DIPROLENE-AF) 0.05 % external ointment Apply topically 2 times daily 45 g 1     benzonatate (TESSALON) 200 MG capsule Take 1 capsule (200 mg) by mouth 3 times daily as needed for cough 20 capsule 0     buPROPion (WELLBUTRIN SR) 150 MG 12 hr tablet Take 1 tablet once daily and increase to 1 tablet twice daily after 4 to 7 days 180 tablet 3     calcium carbonate (TUMS) 500 MG chewable tablet Take 2 chew tab by mouth 2 times daily as needed for heartburn       calcium Citrate-vitamin D 500-400 MG-UNIT CHEW Take 500 mg by mouth 2 times daily 60 tablet prn     clopidogrel (PLAVIX) 75 MG tablet Take 1 tablet (75 mg) by mouth daily 90 tablet 3     Cyanocobalamin (VITAMIN B 12 PO) Take 500 mcg by mouth daily        esomeprazole (NEXIUM) 20 MG CR capsule Take 1 capsule (20 mg) by mouth 2 times daily Take 30-60 minutes before eating. 84 capsule 8     metoprolol succinate ER (TOPROL-XL) 25 MG 24 hr tablet Take 1 tablet (25 mg) by mouth daily 90 tablet 3     mometasone (NASONEX) 50 MCG/ACT spray Spray 2 sprays into both nostrils daily       Multiple Vitamin (DAILY MULTIVITAMIN PO) Take  by mouth. Take 2 chewable tablets daily       PARoxetine (PAXIL) 40 MG tablet Take 0.5 tablets (20 mg) by mouth At Bedtime 45 tablet 3     Protein (UNJURY) POWD Take 21 g by mouth daily Unjury 21 gram-100 kcal/27 gram Powder 2 Bottle 11       Patient Active Problem List    Diagnosis     GERD (gastroesophageal reflux disease)     Bariatric surgery status     Anxiety     Tobacco use disorder     Complex cyst of left ovary     Osteopenia, unspecified location     Celiac artery aneurysm (H)       Past Medical History:   Diagnosis Date     Depressive disorder forever     Endometriosis     found at time of abdominal hysterectomy     Gastro-oesophageal reflux disease      Hiatal hernia      Hypertension      PONV (postoperative nausea and vomiting)        Past Surgical History:   Procedure Laterality Date      SECTION      x2     CHOLECYSTECTOMY       ESOPHAGOSCOPY, GASTROSCOPY, DUODENOSCOPY (EGD), COMBINED  2012    Procedure: COMBINED ESOPHAGOSCOPY, GASTROSCOPY, DUODENOSCOPY (EGD), BIOPSY SINGLE OR MULTIPLE;;  Surgeon: Scott Seth MD;  Location: [facility]     GENITOURINARY SURGERY  ??    bladder sling with hysterectomy early      GYN SURGERY      tubal ligation     hysterectomy with bladder sling      left ovaries     HYSTERECTOMY, PAP NO LONGER INDICATED      Abdominal hyst     LAPAROSCOPIC GASTRIC SLEEVE  5/10/2013    Procedure: LAPAROSCOPIC GASTRIC SLEEVE;  Laparoscopic Sleeve Gastrectomy with hiatal hernia repair;  Surgeon: Scott Seth MD;  Location: [facility]     LAPAROSCOPIC OOPHORECTOMY Left 2018    Procedure: LAPAROSCOPIC OOPHORECTOMY;  Adhesiolysis and Left Laparoscopic Oophorectomy;  Surgeon: Maritza Fowler MD;  [facility]       ORTHOPEDIC SURGERY      right foot surgery       Family History   Problem Relation Age of Onset     Hypertension Father              Substance Abuse Father            alcoholism     Diabetes Mother         diet controlled     Hypertension Mother         meds     Coronary Artery Disease Mother 67        quadruple bypass     Hyperlipidemia Mother      Substance Abuse Mother         alcoholism     Obesity Mother      Hypertension Sister      Anxiety Disorder Sister       Anxiety Disorder Daughter      Anxiety Disorder Niece      Hypertension Sister         meds     Anxiety Disorder Sister         no treated     Hyperlipidemia Sister      Brain Tumor Other      Heart Failure Other      Anxiety Disorder Daughter         on meds     Anesthesia Reaction Daughter      Asthma Daughter      Anxiety Disorder Niece         on meds     Substance Abuse Son         drug addiction     Cancer No family hx of      Cerebrovascular Disease No family hx of      Thyroid Disease No family hx of      Glaucoma No family hx of      Macular Degeneration No family hx of        Social History     Tobacco Use     Smoking status: Current Some Day Smoker     Packs/day: 0.50     Years: 20.00     Pack years: 10.00     Types: Cigarettes     Smokeless tobacco: Never Used     Tobacco comment: Tried many times; 3-5cigarettes/day    Substance Use Topics     Alcohol use: No     Comment: seldom          Objective:    /78   Pulse 99   Wt 70.8 kg (156 lb)   BMI 29.72 kg/m    Pulses are palpable +2/4 DP & PT bilateral.  Sensation to light touch is intact.  No fore foot deformities are noted.  Normal ROM all forefoot joints.  No evidence of deep abscess or infection noted.  Pain on palpation of right and left first toe both borders.  Erythema, edema, and some proud tissue noted.  Nail appears to be incurvated on the affected border.     Assessment:  Onychocryptosis with paronychia right and left first toe both border(s)    Plan:  Discussed etiology and treatment options with the pt.  The potential causes and nature of an ingrown toenail were discussed with the patient.  We reviewed the natural history/prognosis of the condition and potential risks if no treatment is provided.      Treatment options discussed included conservative management (oral antibiotics, soaking of foot, adequate width shoes)  as well as surgical management (partial or total nail removal).  The pros and cons of both forms of treatment were  reviewed.  Handout dispensed.       After thorough discussion and answering all questions, the patient elected to have permanent removal of right and left first toe both borders.  Risk complications and efficacy discussed with patient.  Obtained consent, used 3cc of 1% lidocaine plain to block aformentioned toe(s).  Sterile prep, then avulsed right and left first toe both border(s).  No evidence of deep abscess noted.  Phenol was applied times 3 at 30 second intervals with curettage in between and then alcohol rinse.  Pt tolerated procedure well.  Sterile bandage placed, gave wound care instruction.  T3 for pain.  Return to clinic prn.    Harley Carlisle, SHAWNA DPM, FACFAS

## 2019-10-24 NOTE — PATIENT INSTRUCTIONS
Weight management plan: Patient was referred to their PCP to discuss a diet and exercise plan.  We wish you continued good healing. If you have any questions or concerns, please do not hesitate to contact us at [phone]    Please remember to call and schedule a follow up appointment if one was recommended at your earliest convenience.   PODIATRY CLINIC HOURS  TELEPHONE NUMBER    Dr. Harley Carlisle D.P.M [facility]    Clinics:  [facility]    [facility]   Tuesday 1PM-6PM  [place]  Wednesday 7AM-2PM  [facility]  Thursday 10AM-6PM  [place]  Friday 7AM-3PM  [place]  Specialty schedulers:   [phone] to make an appointment with any Specialty Provider.        Urgent Care locations:    [facility] Monday-Friday 5 pm - 9 pm. Saturday-Sunday 9 am -5pm    Monday-Friday 11 am - 9 pm Saturday 9 am - 5 pm     Monday-Sunday 12 noon-8PM [phone]     If you need a medication refill, please contact us you may need lab work and/or a follow up visit prior to your refill (i.e. Antifungal medications).    [employer] (secure e-mail communication and access to your chart) to send a message or to make an appointment.    If MRI needed please call [facility] at [phone]        SMOKING CESSATION  What's in cigarette smoke? - Cigarette smoke contains over 4,000 chemicals. Nicotine is one of the main ingredients which is an insecticide/herbicide. It is poisonous to our nervous system, increases blood clotting risk, and decreases the body's defenses to fight off infection. Another chemical is Carbon Monoxide is an asphyxiating gas that permanently binds to blood cells and blocks the transport of oxygen. This leads to tissue death and decreases your metabolism. Tar is a chemical that coats your lungs and trachea which impairs new oxygen coming in and carbon dioxide getting out  of your body.   How does smoking impact surgery? - Smoking is particularly hazardous with regards to surgery. Surgery puts stress on the body and a smoker's body is already under strain from these chemicals. Putting the two together, especially for an elective surgery, could be a recipe for disaster. Smoking before and after surgery increases your risk of heart problems, slow wound healing, delayed bone healing, blood clots, wound infection and anesthesia complications.   What are the benefits of quitting? - In 20 minutes your blood pressure will drop back down to normal. In 8 hours the carbon monoxide (a toxic gas) levels in your blood stream will drop by half, and oxygen levels will return to normal. In 48 hours your chance of having a heart attack will have decreased. All nicotine will have left your body. Your sense of taste and smell will return to a normal level. In 72 hours your bronchial tubes will relax, and your energy levels will increase. In 2 weeks your circulation will increase, and it will continue to improve for the next 10 weeks.    Recommendations for elective surgery - Ideally, patients should quit smoking 8 weeks before and at least 2 weeks after elective surgery in order to avoid complications. Simply cutting back on the amount of cigarettes smoked per day does not offer any benefit or decrease the risk of poor wound healing, heart problems, and infection. Smokers should also start taking Vitamin C and B for two weeks before surgery and two weeks after surgery.    Ways to Stop Smokin. Nicotine patches, lozenges, or gum  2. Support Groups  3. Medications (see below)    List of Medications:  1. Varenicline Tartrate (CHANTIX)   2. Bupropion HCL (WELLBUTRIN, ZYBAN) - note: make sure Wellbutrin is for smoking cessation and not other issues   3. Nicotine Patch (NICODERM)   4. Nicotine Inhaler (NICOTROL)   5. Nicotine Gum Nicotine Polacrilex   6. Nicotine Lozenge: Nicotine Polacrilex (COMMIT)    * [employer] offers a smoking support group as well!  Please visit: [email or web address]  If you are interested in these, ask about getting a prescription or talk to your primary care doctor about what may be the best way for you to quit.

## 2019-11-04 ENCOUNTER — TRANSFERRED RECORDS (OUTPATIENT)
Dept: HEALTH INFORMATION MANAGEMENT | Facility: CLINIC | Age: 44
End: 2019-11-04

## 2019-12-10 ENCOUNTER — TELEPHONE (OUTPATIENT)
Dept: OTHER | Facility: CLINIC | Age: 44
End: 2019-12-10

## 2019-12-10 NOTE — TELEPHONE ENCOUNTER
Discussed with Dr. Fleming who approved for pt to hold Plavix for asap procedure at [facility].     I called [facility], transferred to nurse line, on hold then went to , left  explaining this.     Emy Nuñez, REBECAN, RN  [facility] [place]

## 2019-12-10 NOTE — TELEPHONE ENCOUNTER
Received VM from [facility] ([phone]) for Plavix hold orders for ASAP procedure at [facility]. Routing to Dr. Fleming's nurse.    Laurence JOSEN, RN    [facility] [place]  Office: [phone]  Fax: [phone]

## 2019-12-10 NOTE — TELEPHONE ENCOUNTER
Pt hx of celiac artery dissection. Put on Plavix by Dr. Fleming for this dx.   LOV 8/27/19.     I called [facility], was on hold a long time.     Will also discuss with Dr. Fleming re if okay to hold Plavix.     Emy Nuñez, REBECAN, RN  [facility]

## 2019-12-18 ENCOUNTER — TRANSFERRED RECORDS (OUTPATIENT)
Dept: HEALTH INFORMATION MANAGEMENT | Facility: CLINIC | Age: 44
End: 2019-12-18

## 2019-12-21 DIAGNOSIS — K21.9 GASTROESOPHAGEAL REFLUX DISEASE, ESOPHAGITIS PRESENCE NOT SPECIFIED: ICD-10-CM

## 2019-12-23 RX ORDER — PROPRANOLOL HYDROCHLORIDE 10 MG/1
TABLET ORAL
Qty: 84 CAPSULE | Refills: 8 | Status: SHIPPED | OUTPATIENT
Start: 2019-12-23

## 2019-12-23 NOTE — TELEPHONE ENCOUNTER
"Routing refill request to provider for review/approval because:  Failed protocol - see below    Requested Prescriptions   Pending Prescriptions Disp Refills     NEXIUM 24HR 20 MG DR capsule [Pharmacy Med Name: esomeprazole magnesium 20 mg capsule,delayed release] 84 capsule 8     Sig: Take 1 capsule (20 mg) by mouth Twice a Day, 30-60 minutes before eating.       PPI Protocol Failed - 12/23/2019  8:37 AM        Failed - Not on Clopidogrel (unless Pantoprazole ordered)        Passed - No diagnosis of osteoporosis on record        Passed - Recent (12 mo) or future (30 days) visit within the authorizing provider's specialty     Patient has had an office visit with the authorizing provider or a provider within the authorizing providers department within the previous 12 mos or has a future within next 30 days. See \"Patient Info\" tab in inbasket, or \"Choose Columns\" in Meds & Orders section of the refill encounter.              Passed - Medication is active on med list        Passed - Patient is age 18 or older        Passed - No active pregnacy on record        Passed - No positive pregnancy test in past 12 months        Reva Epps RN  "

## 2020-01-07 ENCOUNTER — TELEPHONE (OUTPATIENT)
Dept: FAMILY MEDICINE | Facility: CLINIC | Age: 45
End: 2020-01-07

## 2020-01-07 DIAGNOSIS — Z13.6 CARDIOVASCULAR SCREENING; LDL GOAL LESS THAN 160: ICD-10-CM

## 2020-01-07 DIAGNOSIS — Z98.84 BARIATRIC SURGERY STATUS: Primary | ICD-10-CM

## 2020-01-07 NOTE — TELEPHONE ENCOUNTER
Patient has appointment scheduled for tomorrow at 6:40 PM. She is requesting orders be placed for labs to complete tomorrow AM.   Orders syed'd up. Unsure if anything else is needed.     Reva Epps RN

## 2020-01-07 NOTE — TELEPHONE ENCOUNTER
Reason for Call: Request for an order or referral:    Order or referral being requested: Labs for physical     Date needed: as soon as possible    Has the patient been seen by the PCP for this problem? YES    Additional comments: Patient has an appointment tomorrow 1/8/2020 at 640 PM. [facility].     Phone number Patient can be reached at:  Home number on file [phone] (home)    Best Time:  ASAP    Can we leave a detailed message on this number?  YES    Call taken on 1/7/2020 at 8:50 AM by Abhishek Suazo

## 2020-01-08 ENCOUNTER — OFFICE VISIT (OUTPATIENT)
Dept: FAMILY MEDICINE | Facility: CLINIC | Age: 45
End: 2020-01-08
Payer: COMMERCIAL

## 2020-01-08 VITALS
WEIGHT: 157 LBS | TEMPERATURE: 97.8 F | OXYGEN SATURATION: 98 % | BODY MASS INDEX: 29.64 KG/M2 | HEART RATE: 88 BPM | SYSTOLIC BLOOD PRESSURE: 124 MMHG | HEIGHT: 61 IN | DIASTOLIC BLOOD PRESSURE: 86 MMHG

## 2020-01-08 DIAGNOSIS — F41.9 ANXIETY: ICD-10-CM

## 2020-01-08 DIAGNOSIS — R00.2 PALPITATIONS: ICD-10-CM

## 2020-01-08 DIAGNOSIS — D23.71 DERMATOFIBROMA OF THIGH, RIGHT: ICD-10-CM

## 2020-01-08 DIAGNOSIS — K21.9 GASTROESOPHAGEAL REFLUX DISEASE, ESOPHAGITIS PRESENCE NOT SPECIFIED: ICD-10-CM

## 2020-01-08 DIAGNOSIS — Z98.84 BARIATRIC SURGERY STATUS: ICD-10-CM

## 2020-01-08 DIAGNOSIS — E78.5 HYPERLIPIDEMIA LDL GOAL <100: ICD-10-CM

## 2020-01-08 DIAGNOSIS — Z13.6 CARDIOVASCULAR SCREENING; LDL GOAL LESS THAN 160: ICD-10-CM

## 2020-01-08 DIAGNOSIS — J01.90 ACUTE SINUSITIS WITH SYMPTOMS > 10 DAYS: ICD-10-CM

## 2020-01-08 DIAGNOSIS — I72.8 CELIAC ARTERY ANEURYSM (H): ICD-10-CM

## 2020-01-08 DIAGNOSIS — F17.200 TOBACCO USE DISORDER: ICD-10-CM

## 2020-01-08 DIAGNOSIS — Z12.31 ENCOUNTER FOR SCREENING MAMMOGRAM FOR BREAST CANCER: ICD-10-CM

## 2020-01-08 DIAGNOSIS — Z00.00 ROUTINE GENERAL MEDICAL EXAMINATION AT A HEALTH CARE FACILITY: Primary | ICD-10-CM

## 2020-01-08 DIAGNOSIS — Z23 NEED FOR PROPHYLACTIC VACCINATION AND INOCULATION AGAINST INFLUENZA: ICD-10-CM

## 2020-01-08 LAB
ALBUMIN SERPL-MCNC: 4 G/DL (ref 3.4–5)
ALP SERPL-CCNC: 49 U/L (ref 40–150)
ALT SERPL W P-5'-P-CCNC: 19 U/L (ref 0–50)
ANION GAP SERPL CALCULATED.3IONS-SCNC: 3 MMOL/L (ref 3–14)
AST SERPL W P-5'-P-CCNC: 13 U/L (ref 0–45)
BASOPHILS # BLD AUTO: 0 10E9/L (ref 0–0.2)
BASOPHILS NFR BLD AUTO: 0.5 %
BILIRUB SERPL-MCNC: 0.4 MG/DL (ref 0.2–1.3)
BUN SERPL-MCNC: 20 MG/DL (ref 7–30)
CALCIUM SERPL-MCNC: 9.3 MG/DL (ref 8.5–10.1)
CHLORIDE SERPL-SCNC: 111 MMOL/L (ref 94–109)
CHOLEST SERPL-MCNC: 255 MG/DL
CO2 SERPL-SCNC: 29 MMOL/L (ref 20–32)
CREAT SERPL-MCNC: 0.66 MG/DL (ref 0.52–1.04)
DIFFERENTIAL METHOD BLD: NORMAL
EOSINOPHIL # BLD AUTO: 0.1 10E9/L (ref 0–0.7)
EOSINOPHIL NFR BLD AUTO: 2.4 %
ERYTHROCYTE [DISTWIDTH] IN BLOOD BY AUTOMATED COUNT: 12.3 % (ref 10–15)
FERRITIN SERPL-MCNC: 14 NG/ML (ref 8–252)
GFR SERPL CREATININE-BSD FRML MDRD: >90 ML/MIN/{1.73_M2}
GLUCOSE SERPL-MCNC: 85 MG/DL (ref 70–99)
HCT VFR BLD AUTO: 39 % (ref 35–47)
HDLC SERPL-MCNC: 48 MG/DL
HGB BLD-MCNC: 13.5 G/DL (ref 11.7–15.7)
LDLC SERPL CALC-MCNC: 171 MG/DL
LYMPHOCYTES # BLD AUTO: 1.8 10E9/L (ref 0.8–5.3)
LYMPHOCYTES NFR BLD AUTO: 42.4 %
MCH RBC QN AUTO: 30.1 PG (ref 26.5–33)
MCHC RBC AUTO-ENTMCNC: 34.6 G/DL (ref 31.5–36.5)
MCV RBC AUTO: 87 FL (ref 78–100)
MONOCYTES # BLD AUTO: 0.3 10E9/L (ref 0–1.3)
MONOCYTES NFR BLD AUTO: 7.1 %
NEUTROPHILS # BLD AUTO: 2 10E9/L (ref 1.6–8.3)
NEUTROPHILS NFR BLD AUTO: 47.6 %
NONHDLC SERPL-MCNC: 207 MG/DL
PLATELET # BLD AUTO: 184 10E9/L (ref 150–450)
POTASSIUM SERPL-SCNC: 4.6 MMOL/L (ref 3.4–5.3)
PROT SERPL-MCNC: 6.8 G/DL (ref 6.8–8.8)
RBC # BLD AUTO: 4.48 10E12/L (ref 3.8–5.2)
SODIUM SERPL-SCNC: 143 MMOL/L (ref 133–144)
TRIGL SERPL-MCNC: 181 MG/DL
VIT B12 SERPL-MCNC: 668 PG/ML (ref 193–986)
WBC # BLD AUTO: 4.2 10E9/L (ref 4–11)

## 2020-01-08 PROCEDURE — 90686 IIV4 VACC NO PRSV 0.5 ML IM: CPT | Performed by: NURSE PRACTITIONER

## 2020-01-08 PROCEDURE — 82728 ASSAY OF FERRITIN: CPT | Performed by: NURSE PRACTITIONER

## 2020-01-08 PROCEDURE — 99213 OFFICE O/P EST LOW 20 MIN: CPT | Mod: 25 | Performed by: NURSE PRACTITIONER

## 2020-01-08 PROCEDURE — 85025 COMPLETE CBC W/AUTO DIFF WBC: CPT | Performed by: NURSE PRACTITIONER

## 2020-01-08 PROCEDURE — 80053 COMPREHEN METABOLIC PANEL: CPT | Performed by: NURSE PRACTITIONER

## 2020-01-08 PROCEDURE — 90732 PPSV23 VACC 2 YRS+ SUBQ/IM: CPT | Performed by: NURSE PRACTITIONER

## 2020-01-08 PROCEDURE — 90472 IMMUNIZATION ADMIN EACH ADD: CPT | Performed by: NURSE PRACTITIONER

## 2020-01-08 PROCEDURE — 36415 COLL VENOUS BLD VENIPUNCTURE: CPT | Performed by: NURSE PRACTITIONER

## 2020-01-08 PROCEDURE — 90471 IMMUNIZATION ADMIN: CPT | Performed by: NURSE PRACTITIONER

## 2020-01-08 PROCEDURE — 80061 LIPID PANEL: CPT | Performed by: NURSE PRACTITIONER

## 2020-01-08 PROCEDURE — 82607 VITAMIN B-12: CPT | Performed by: NURSE PRACTITIONER

## 2020-01-08 PROCEDURE — 99396 PREV VISIT EST AGE 40-64: CPT | Mod: 25 | Performed by: NURSE PRACTITIONER

## 2020-01-08 RX ORDER — PAROXETINE 40 MG/1
20 TABLET, FILM COATED ORAL AT BEDTIME
Qty: 45 TABLET | Refills: 3 | Status: SHIPPED | OUTPATIENT
Start: 2020-01-08

## 2020-01-08 RX ORDER — BUPROPION HYDROCHLORIDE 150 MG/1
150 TABLET, EXTENDED RELEASE ORAL 2 TIMES DAILY
Qty: 180 TABLET | Refills: 3 | Status: SHIPPED | OUTPATIENT
Start: 2020-01-08

## 2020-01-08 RX ORDER — AZITHROMYCIN 250 MG/1
TABLET, FILM COATED ORAL
Qty: 6 TABLET | Refills: 0 | Status: SHIPPED | OUTPATIENT
Start: 2020-01-08 | End: 2020-03-13

## 2020-01-08 RX ORDER — SIMVASTATIN 10 MG
10 TABLET ORAL AT BEDTIME
Qty: 90 TABLET | Refills: 3 | Status: SHIPPED | OUTPATIENT
Start: 2020-01-08 | End: 2020-05-11

## 2020-01-08 ASSESSMENT — ENCOUNTER SYMPTOMS
MYALGIAS: 0
HEARTBURN: 1
CONSTIPATION: 0
FEVER: 0
SHORTNESS OF BREATH: 0
JOINT SWELLING: 0
FREQUENCY: 0
CHILLS: 0
COUGH: 0
DYSURIA: 0
ABDOMINAL PAIN: 0
DIARRHEA: 0
HEMATOCHEZIA: 0
WEAKNESS: 0
EYE PAIN: 0
HEMATURIA: 0
SORE THROAT: 0
HEADACHES: 1
NERVOUS/ANXIOUS: 1
PARESTHESIAS: 0
NAUSEA: 0
BREAST MASS: 0
PALPITATIONS: 1
DIZZINESS: 1
ARTHRALGIAS: 0

## 2020-01-08 ASSESSMENT — MIFFLIN-ST. JEOR: SCORE: 1285.56

## 2020-01-09 NOTE — PROGRESS NOTES
SUBJECTIVE:   CC: Yvonne Zarco is an 44 year old woman who presents for preventive health visit.     Healthy Habits:     Getting at least 3 servings of Calcium per day:  Yes    Bi-annual eye exam:  Yes    Dental care twice a year:  Yes    Sleep apnea or symptoms of sleep apnea:  None    Diet:  Other    Frequency of exercise:  2-3 days/week    Duration of exercise:  15-30 minutes    Taking medications regularly:  Yes    Medication side effects:  Not applicable    PHQ-2 Total Score: 0    Additional concerns today:  No          Anxiety Follow-Up    How are you doing with your anxiety since your last visit? No change    Are you having other symptoms that might be associated with anxiety? No    Have you had a significant life event? No     Are you feeling depressed? No    Do you have any concerns with your use of alcohol or other drugs? No    Social History     Tobacco Use     Smoking status: Current Some Day Smoker     Packs/day: 0.50     Years: 20.00     Pack years: 10.00     Types: Cigarettes     Smokeless tobacco: Never Used     Tobacco comment: Tried many times; 3-5cigarettes/day    Substance Use Topics     Alcohol use: No     Comment: seldom      Drug use: No     ZI-7 SCORE 9/7/2016 11/7/2018   Total Score 7 13     PHQ 9/7/2016   PHQ-9 Total Score 8   Q9: Thoughts of better off dead/self-harm past 2 weeks Not at all     Since last seen, patient was diagnosed with celiac artery aneurysm and following with vascular surgery.    Had a cold over Thanksgiving and still has sinus pressure.    The 10-year ASCVD risk score (Sindhu ALFREDA Larios., et al., 2013) is: 4.9%    Values used to calculate the score:      Age: 44 years      Sex: Female      Is Non- : No      Diabetic: No      Tobacco smoker: Yes      Systolic Blood Pressure: 124 mmHg      Is BP treated: No      HDL Cholesterol: 48 mg/dL      Total Cholesterol: 255 mg/dL        Today's PHQ-2 Score:   PHQ-2 ( 1999 Pf[employer]) 1/8/2020   Q1: Little  interest or pleasure in doing things 0   Q2: Feeling down, depressed or hopeless 0   PHQ-2 Score 0   Q1: Little interest or pleasure in doing things Not at all   Q2: Feeling down, depressed or hopeless Not at all   PHQ-2 Score 0       Abuse: Current or Past(Physical, Sexual or Emotional)- No  Do you feel safe in your environment? Yes        Social History     Tobacco Use     Smoking status: Current Some Day Smoker     Packs/day: 0.50     Years: 20.00     Pack years: 10.00     Types: Cigarettes     Smokeless tobacco: Never Used     Tobacco comment: Tried many times; 3-5cigarettes/day    Substance Use Topics     Alcohol use: No     Comment: seldom      Alcohol Use 1/8/2020   Prescreen: >3 drinks/day or >7 drinks/week? Not Applicable   Prescreen: >3 drinks/day or >7 drinks/week? -       Reviewed orders with patient.  Reviewed health maintenance and updated orders accordingly - Yes  Labs reviewed in [facility]    Mammogram Screening: Patient under age 50, mutual decision reflected in health maintenance.      Pertinent mammograms are reviewed under the imaging tab.  History of abnormal Pap smear: Status post benign hysterectomy. Health Maintenance and Surgical History updated.     Reviewed and updated as needed this visit by clinical staff  Tobacco  Allergies  Meds         Reviewed and updated as needed this visit by Provider            Review of Systems   Constitutional: Negative for chills and fever.   HENT: Positive for congestion. Negative for ear pain, hearing loss and sore throat.    Eyes: Negative for pain and visual disturbance.   Respiratory: Negative for cough and shortness of breath.    Cardiovascular: Positive for palpitations (only occur at night laying in bed, brief and then resolves without associated symptoms). Negative for chest pain and peripheral edema.   Gastrointestinal: Positive for heartburn. Negative for abdominal pain, constipation, diarrhea, hematochezia and nausea.   Breasts:  Negative for  "tenderness, breast mass and discharge.   Genitourinary: Negative for dysuria, frequency, genital sores, hematuria, pelvic pain, urgency, vaginal bleeding and vaginal discharge.   Musculoskeletal: Negative for arthralgias, joint swelling and myalgias.   Skin: Negative for rash.   Neurological: Positive for dizziness and headaches. Negative for weakness and paresthesias.   Psychiatric/Behavioral: Negative for mood changes. The patient is nervous/anxious.         OBJECTIVE:   /86 (BP Location: Left arm, Patient Position: Chair, Cuff Size: Adult Regular)   Pulse 88   Temp 97.8  F (36.6  C) (Oral)   Ht 1.543 m (5' 0.75\")   Wt 71.2 kg (157 lb)   SpO2 98%   BMI 29.91 kg/m    Physical Exam  GENERAL: healthy, alert and no distress  EYES: Eyes grossly normal to inspection, PERRL and conjunctivae and sclerae normal  HENT: ear canals and TM's normal, nose and mouth without ulcers or lesions  NECK: no adenopathy, no asymmetry, masses, or scars and thyroid normal to palpation  RESP: lungs clear to auscultation - no rales, rhonchi or wheezes  CV: regular rate and rhythm, normal S1 S2, no S3 or S4, no murmur, click or rub, no peripheral edema and peripheral pulses strong  ABDOMEN: soft, nontender, no hepatosplenomegaly, no masses and bowel sounds normal  MS: no gross musculoskeletal defects noted, no edema  SKIN: 5mm mildly erythematous, smooth papule with slight excoriation and hyperpigmented base right lateral thigh  NEURO: Normal strength and tone, mentation intact and speech normal  PSYCH: mentation appears normal, affect normal/bright    Diagnostic Test Results:  Labs reviewed in Epic  Results for orders placed or performed in visit on 01/08/20 (from the past 24 hour(s))   Comprehensive metabolic panel   Result Value Ref Range    Sodium 143 133 - 144 mmol/L    Potassium 4.6 3.4 - 5.3 mmol/L    Chloride 111 (H) 94 - 109 mmol/L    Carbon Dioxide 29 20 - 32 mmol/L    Anion Gap 3 3 - 14 mmol/L    Glucose 85 70 - 99 " mg/dL    Urea Nitrogen 20 7 - 30 mg/dL    Creatinine 0.66 0.52 - 1.04 mg/dL    GFR Estimate >90 >60 mL/min/[1.73_m2]    GFR Estimate If Black >90 >60 mL/min/[1.73_m2]    Calcium 9.3 8.5 - 10.1 mg/dL    Bilirubin Total 0.4 0.2 - 1.3 mg/dL    Albumin 4.0 3.4 - 5.0 g/dL    Protein Total 6.8 6.8 - 8.8 g/dL    Alkaline Phosphatase 49 40 - 150 U/L    ALT 19 0 - 50 U/L    AST 13 0 - 45 U/L   Vitamin B12   Result Value Ref Range    Vitamin B12 668 193 - 986 pg/mL   Ferritin   Result Value Ref Range    Ferritin 14 8 - 252 ng/mL   CBC with platelets differential   Result Value Ref Range    WBC 4.2 4.0 - 11.0 10e9/L    RBC Count 4.48 3.8 - 5.2 10e12/L    Hemoglobin 13.5 11.7 - 15.7 g/dL    Hematocrit 39.0 35.0 - 47.0 %    MCV 87 78 - 100 fl    MCH 30.1 26.5 - 33.0 pg    MCHC 34.6 31.5 - 36.5 g/dL    RDW 12.3 10.0 - 15.0 %    Platelet Count 184 150 - 450 10e9/L    % Neutrophils 47.6 %    % Lymphocytes 42.4 %    % Monocytes 7.1 %    % Eosinophils 2.4 %    % Basophils 0.5 %    Absolute Neutrophil 2.0 1.6 - 8.3 10e9/L    Absolute Lymphocytes 1.8 0.8 - 5.3 10e9/L    Absolute Monocytes 0.3 0.0 - 1.3 10e9/L    Absolute Eosinophils 0.1 0.0 - 0.7 10e9/L    Absolute Basophils 0.0 0.0 - 0.2 10e9/L    Diff Method Automated Method    Lipid panel reflex to direct LDL Fasting   Result Value Ref Range    Cholesterol 255 (H) <200 mg/dL    Triglycerides 181 (H) <150 mg/dL    HDL Cholesterol 48 (L) >49 mg/dL    LDL Cholesterol Calculated 171 (H) <100 mg/dL    Non HDL Cholesterol 207 (H) <130 mg/dL       ASSESSMENT/PLAN:   1. Routine general medical examination at a health care facility      2. Encounter for screening mammogram for breast cancer    - MA SCREENING DIGITAL BILAT - Future  (s+30); Future    3. Tobacco use disorder  Encouraged complete cessation particularly in light of recent aneurysm diagnosis  - buPROPion (WELLBUTRIN SR) 150 MG 12 hr tablet; Take 1 tablet (150 mg) by mouth 2 times daily Take 1 tablet once daily and increase to 1  "tablet twice daily after 4 to 7 days  Dispense: 180 tablet; Refill: 3    4. Hyperlipidemia LDL goal <100  ASCVD risk low but recommend more aggressive treatment due to aneurysm and strong family history CVD. Offered 3 months of lifestyle interventions vs. starting statin today and patient prefers the latter. Follow-up in 1-3 months for fasting lipid panel.  - simvastatin (ZOCOR) 10 MG tablet; Take 1 tablet (10 mg) by mouth At Bedtime  Dispense: 90 tablet; Refill: 3  - Lipid panel reflex to direct LDL Fasting; Future    5. Palpitations  Symptoms are mild, no alarm symptoms, and has had recent thorough cardiac work-up. If palpitations become more persistent or has lightheadedness, shortness of breath associated, follow up for further evaluation.    6. Acute sinusitis with symptoms > 10 days    - azithromycin (ZITHROMAX) 250 MG tablet; Take 2 tablets (500 mg) by mouth daily for 1 day, THEN 1 tablet (250 mg) daily for 4 days.  Dispense: 6 tablet; Refill: 0    7. Dermatofibroma of thigh, right  Reassured this is benign    8. Anxiety  Doing well  - PARoxetine (PAXIL) 40 MG tablet; Take 0.5 tablets (20 mg) by mouth At Bedtime  Dispense: 45 tablet; Refill: 3    9. Gastroesophageal reflux disease, esophagitis presence not specified  Patient trying to wean off Nexium- can use Zantac instead.  - ranitidine (ZANTAC) 300 MG tablet; Take 0.5 tablets (150 mg) by mouth At Bedtime  Dispense: 90 tablet; Refill: 3    10. Celiac artery aneurysm (H)  Following with vascular       COUNSELING:  Reviewed preventive health counseling, as reflected in patient instructions       Regular exercise       Healthy diet/nutrition       Immunizations    Vaccinated for: Influenza and Pneumococcal          Estimated body mass index is 29.91 kg/m  as calculated from the following:    Height as of this encounter: 1.543 m (5' 0.75\").    Weight as of this encounter: 71.2 kg (157 lb).    Weight management plan: Discussed healthy diet and exercise " guidelines     reports that she has been smoking cigarettes. She has a 10.00 pack-year smoking history. She has never used smokeless tobacco.  Tobacco Cessation Action Plan: Pharmacotherapies : Zyban/Wellbutrin    Counseling Resources:  ATP IV Guidelines  Pooled Cohorts Equation Calculator  Breast Cancer Risk Calculator  FRAX Risk Assessment  ICSI Preventive Guidelines  Dietary Guidelines for Americans, 2010  USDA's MyPlate  ASA Prophylaxis  Lung CA Screening    ISH Zurita CNP  [facility]

## 2020-01-29 DIAGNOSIS — Z98.84 BARIATRIC SURGERY STATUS: ICD-10-CM

## 2020-01-30 ENCOUNTER — TELEPHONE (OUTPATIENT)
Dept: OTHER | Facility: CLINIC | Age: 45
End: 2020-01-30

## 2020-01-30 NOTE — TELEPHONE ENCOUNTER
Protein (UNJURY) POWD (Discontinued)      Last Written Prescription Date:  1/24/2014  Last Fill Quantity: 630g,   # refills: prn  Last Office Visit: 1/8/2020  Future Office visit:       Routing refill request to provider for review/approval because:  Drug not active on patient's medication list

## 2020-01-30 NOTE — TELEPHONE ENCOUNTER
Pt is due for follow up with Dr. Fleming, pt had CTA abd/pelvis at [facility] on 11/26/19, I will obtain images to be sent to PACS.     Pt need to be scheduled for OV.     I called [facility], they are pushing image to PACS.     REBECA De LeonN, RN  [facility] [place]

## 2020-01-31 ENCOUNTER — TRANSFERRED RECORDS (OUTPATIENT)
Dept: HEALTH INFORMATION MANAGEMENT | Facility: CLINIC | Age: 45
End: 2020-01-31

## 2020-01-31 DIAGNOSIS — Z98.84 BARIATRIC SURGERY STATUS: ICD-10-CM

## 2020-01-31 NOTE — TELEPHONE ENCOUNTER
Protein Powd  Last Written Prescription Date:    Last Fill Quantity: ,   # refills:   Last Office Visit: 1/8/2020  Future Office visit:    Next 5 appointments (look out 90 days)    Mar 10, 2020  8:00 AM CDT  Return Visit with Franky Fleming MD  [facility] ([facility] [address] [address]  [phone]           Routing refill request to provider for review/approval because:  Drug not active on patient's medication list  Medication is reported/historical

## 2020-02-02 RX ORDER — IRON,CARBONYL/FOLIC ACID/MV-MN 30-0.8MG
TABLET,CHEWABLE ORAL
Qty: 924 G | Refills: 11 | OUTPATIENT
Start: 2020-02-02

## 2020-02-02 NOTE — TELEPHONE ENCOUNTER
This Order Has Been Discontinued     Order Status Reason By On   Discontinued Therapy completed Chloe Breaux, CMA 1/8/20 0901

## 2020-02-04 RX ORDER — IRON,CARBONYL/FOLIC ACID/MV-MN 30-0.8MG
TABLET,CHEWABLE ORAL
Qty: 924 G | Refills: 11 | Status: SHIPPED | OUTPATIENT
Start: 2020-02-04

## 2020-02-04 NOTE — TELEPHONE ENCOUNTER
Routing refill request to provider for review/approval because:  Drug not on the [facility] refill protocol     Requested Prescriptions   Pending Prescriptions Disp Refills     Protein (UNJURY) POWD [Pharmacy Med Name: whey protein isolate 21 gram-100 kcal/27 gram oral powder] 924 g 11     Sig: Take 21 g by mouth once daily.       There is no refill protocol information for this order        Reva Epps RN

## 2020-02-04 NOTE — TELEPHONE ENCOUNTER
Patient needs prescription for protein powder for her hsa. Patient states if you need instructions on how to fill the prescription she wants you to look at the her prior prescription.

## 2020-02-04 NOTE — TELEPHONE ENCOUNTER
Called and left patient VM to return call to red team to see if patient is still on medication  Eliezer Benson CMA on 2/4/2020 at 8:29 AM

## 2020-02-19 ENCOUNTER — TELEPHONE (OUTPATIENT)
Dept: FAMILY MEDICINE | Facility: CLINIC | Age: 45
End: 2020-02-19

## 2020-02-19 NOTE — TELEPHONE ENCOUNTER
Huddled with the provider.     Patient needs an appointment if she needs them filed out for herself.     If they are meant for her daughter. Then they can be completed by her daughter's mental health provider. Katherine Schreiber,

## 2020-02-19 NOTE — TELEPHONE ENCOUNTER
Form came in via fax to be completed by the provider: [facility]    Who is the form from? [facility]   (if other please explain)  This was faxed to [facility]  Where was the form placed? Given to physician  What number is listed as a contact on the form?     Please fax to [phone]    Has the patient signed a consent form for release of information? NO    Additional comments:     Type of letter, form or note: LA      Sent forms to the provider to advise. Patient last seen 1/8/2020.

## 2020-02-21 NOTE — TELEPHONE ENCOUNTER
My Chart message sent back letting Yvonne of the providers message to schedule an appointment for Rima Mccollum to complete or a provider caring for her daughter Shaina can also complete the form. Katherine Schreiber,

## 2020-03-10 ENCOUNTER — OFFICE VISIT (OUTPATIENT)
Dept: VASCULAR SURGERY | Facility: CLINIC | Age: 45
End: 2020-03-10
Payer: COMMERCIAL

## 2020-03-10 VITALS — HEART RATE: 75 BPM | RESPIRATION RATE: 16 BRPM | SYSTOLIC BLOOD PRESSURE: 111 MMHG | DIASTOLIC BLOOD PRESSURE: 69 MMHG

## 2020-03-10 DIAGNOSIS — I77.79 CELIAC ARTERY DISSECTION (H): Primary | ICD-10-CM

## 2020-03-10 PROCEDURE — 99213 OFFICE O/P EST LOW 20 MIN: CPT | Performed by: SURGERY

## 2020-03-10 RX ORDER — METOPROLOL SUCCINATE 25 MG/1
25 TABLET, EXTENDED RELEASE ORAL DAILY
Qty: 30 TABLET | Refills: 4 | Status: SHIPPED | OUTPATIENT
Start: 2020-03-10 | End: 2020-03-13

## 2020-03-10 NOTE — NURSING NOTE
"Initial /69 (BP Location: Right arm, Patient Position: Chair, Cuff Size: Adult Regular)   Pulse 75   Resp 16  Estimated body mass index is 29.91 kg/m  as calculated from the following:    Height as of 1/8/20: 1.543 m (5' 0.75\").    Weight as of 1/8/20: 71.2 kg (157 lb). .    Patient is here for a recheck.  lauri carrasquillo LPN    "

## 2020-03-10 NOTE — PROGRESS NOTES
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  She has had symptoms of epigastric pain at the time.    She had developed epigastric pain in November 2019 and went to [facility] who diagnosed her with gastritis.  He also underwent a CT angiogram of the abdomen and pelvis.  I was able to access the records and reviewed them.    Results are noted below.    EXAM: CT ANGIO ABD PELVIS W IV CONT  LOCATION: [facility]  DATE/TIME: 11/26/2019 3:19 AM    INDICATION: History of celiac artery pseudoaneurysm. Here with epigastric pain.  COMPARISON: CT abdomen and pelvis dated 08/20/2019 on outside imaging as visualized on [facility] PACS.  TECHNIQUE: CT angiogram abdomen pelvis during arterial phase of injection of IV contrast. 2D and 3D MIP reconstructions were performed by the CT technologist. Dose reduction techniques were used.  CONTRAST: IOHEXOL 350 MG/ML IV SOLN 100 mL.    FINDINGS:  ANGIOGRAM ABDOMEN/PELVIS: A small intimal irregularity is seen in the celiac artery at the level of the takeoff of the left gastric. Mild adjacent mural thickening with inflammatory changes of the fat, findings are most concerning for a focal celiac dissection. The adjacent inflammatory change and intimal defect appear much less conspicuous on today's CT in comparison with CT from [facility]. Celiac branch vessels appear widely patent. Patent superior mesenteric, renal, and inferior mesenteric arteries. No aortic dissection or aneurysm.    LOWER CHEST: Small hiatal hernia with esophageal reflux.    HEPATOBILIARY: Status post cholecystectomy.    PANCREAS: Normal.    SPLEEN: Normal.    ADRENAL GLANDS: Normal.    KIDNEYS/BLADDER: Bilateral nonobstructing renal stones. No hydroureteronephrosis.    BOWEL: Status post sleeve gastrectomy. No obstruction, colitis, diverticulitis, or appendicitis. Scattered diverticules.    LYMPH NODES: Normal.    PELVIC ORGANS: Absent uterus.    OTHER:  None.    MUSCULOSKELETAL: Normal.    IMPRESSION:  1.  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 Appears improved in comparison with prior study. No aneurysm. No occlusion. No active extravasation.  2.  Status post cholecystectomy.  3.  Status post sleeve gastrectomy.  4.  Status post hysterectomy.  5.  Small hiatal hernia with esophageal reflux.  6.  Mild atherosclerotic vascular disease    I have reassured her of the findings.  She can stop the Plavix but she should continue the metoprolol.    We will see her back in 1 year with CT angiogram of the abdomen and pelvis to document complete resolution of the celiac artery dissection.

## 2020-03-12 ENCOUNTER — TELEPHONE (OUTPATIENT)
Dept: FAMILY MEDICINE | Facility: CLINIC | Age: 45
End: 2020-03-12

## 2020-03-12 NOTE — TELEPHONE ENCOUNTER
Provider ok'd a same day slot. Forms given to the provider.     Next 5 appointments (look out 90 days)    Mar 13, 2020  2:20 PM CDT  Office Visit with ISH Zurita CNP  [facility] ([facility]) [address] [address]  [phone]   Apr 01, 2020  5:40 PM CDT  Pre-Op physical with ISH Zurita CNP  [facility] ([facility]) [address] [address]  [phone]       Katherine Schreiber,

## 2020-03-12 NOTE — TELEPHONE ENCOUNTER
Reason for call:  Other   Patient called regarding (reason for call): call back  Additional comments: Patient is calling because she wants to discuss FMLA paperwork, please call back     Phone number to reach patient:  Work number on file:  [phone] (work)    Best Time:  any    Can we leave a detailed message on this number?  YES    Travel screening: Not Applicable

## 2020-03-13 ENCOUNTER — OFFICE VISIT (OUTPATIENT)
Dept: FAMILY MEDICINE | Facility: CLINIC | Age: 45
End: 2020-03-13
Payer: COMMERCIAL

## 2020-03-13 VITALS
OXYGEN SATURATION: 98 % | TEMPERATURE: 97.7 F | WEIGHT: 160 LBS | HEIGHT: 61 IN | SYSTOLIC BLOOD PRESSURE: 118 MMHG | BODY MASS INDEX: 30.21 KG/M2 | HEART RATE: 90 BPM | DIASTOLIC BLOOD PRESSURE: 86 MMHG

## 2020-03-13 DIAGNOSIS — Z02.89 ENCOUNTER FOR REVIEW OF FORM WITH PATIENT: Primary | ICD-10-CM

## 2020-03-13 PROCEDURE — 99207 ZZC NON-BILLABLE SERV PER CHARTING: CPT | Performed by: NURSE PRACTITIONER

## 2020-03-13 ASSESSMENT — MIFFLIN-ST. JEOR: SCORE: 1299.17

## 2020-03-13 NOTE — PROGRESS NOTES
"Subjective     Yvonne Zarco is a 44 year old female who presents to clinic today for the following health issues:    HPI     Chief Complaint   Patient presents with     FMLA Forms       Patient presents for FMLA forms to be filled out for adult duaghter's illness. Daughter did sign release of information for her mother to discuss her care with me.    Reviewed and updated as needed this visit by Provider         Review of Systems   ROS COMP: Constitutional, HEENT, cardiovascular, pulmonary, gi and gu systems are negative, except as otherwise noted.      Objective    /86 (BP Location: Left arm, Patient Position: Chair, Cuff Size: Adult Regular)   Pulse 90   Temp 97.7  F (36.5  C) (Oral)   Ht 1.543 m (5' 0.75\")   Wt 72.6 kg (160 lb)   SpO2 98%   BMI 30.48 kg/m    Body mass index is 30.48 kg/m .  Physical Exam   GENERAL: healthy, alert and no distress    Diagnostic Test Results:  none         Assessment & Plan     1. Encounter for review of form with patient  FMLA forms filled out for missed work.    See Patient Instructions    No follow-ups on file.    ISH Zurita [facility]        "

## 2020-03-18 ENCOUNTER — E-VISIT (OUTPATIENT)
Dept: FAMILY MEDICINE | Facility: CLINIC | Age: 45
End: 2020-03-18
Payer: COMMERCIAL

## 2020-03-18 DIAGNOSIS — N39.0 ACUTE UTI (URINARY TRACT INFECTION): Primary | ICD-10-CM

## 2020-03-18 DIAGNOSIS — N39.0 ACUTE UTI (URINARY TRACT INFECTION): ICD-10-CM

## 2020-03-18 PROCEDURE — 98970 NQHP OL DIG ASSMT&MGMT 5-10: CPT | Performed by: NURSE PRACTITIONER

## 2020-03-18 RX ORDER — NITROFURANTOIN 25; 75 MG/1; MG/1
100 CAPSULE ORAL 2 TIMES DAILY
Qty: 14 CAPSULE | Refills: 0 | Status: SHIPPED | OUTPATIENT
Start: 2020-03-18 | End: 2020-04-08

## 2020-03-18 RX ORDER — NITROFURANTOIN 25; 75 MG/1; MG/1
100 CAPSULE ORAL 2 TIMES DAILY
Qty: 14 CAPSULE | Refills: 0 | Status: CANCELLED | OUTPATIENT
Start: 2020-03-18

## 2020-03-18 NOTE — TELEPHONE ENCOUNTER
Reason for call:  Medication   If this is a refill request, has the caller requested the refill from the pharmacy already? Yes  Will the patient be using a [facility]? No  Name of the pharmacy and phone number for the current request: [facility]. [phone]      Name of the medication requested:    nitroFURantoin macrocrystal-monohydrate (MACROBID) 100 MG capsule         Other request: any    Phone number to reach patient:  Home number on file [phone] (home)    Best Time:  any    Can we leave a detailed message on this number?  YES    Travel screening: Negative

## 2020-03-18 NOTE — PATIENT INSTRUCTIONS
Thank you for choosing us for your care. I have placed an order for a prescription so that you can start treatment. View your full visit summary for details by clicking on the link below. Your pharmacist will able to address any questions you may have about the medication.     If you re not feeling better within 2-3 days, please schedule an appointment.  You can schedule an appointment right here in [place], or call [phone]  If the visit is for the same symptoms as your e-visit, we ll refund the cost of your e-visit if seen within seven days.      Urinary Tract Infections in Women    Urinary tract infections (UTIs) are most often caused by bacteria. These bacteria enter the urinary tract. The bacteria may come from outside the body. Or they may travel from the skin outside the rectum or vagina into the urethra. Female anatomy makes it easy for bacteria from the bowel to enter a woman s urinary tract, which is the most common source of UTI. This means women develop UTIs more often than men. Pain in or around the urinary tract is a common UTI symptom. But the only way to know for sure if you have a UTI for the healthcare provider to test your urine. The two tests that may be done are the urinalysis and urine culture.  Types of UTIs    Cystitis. A bladder infection (cystitis) is the most common UTI in women. You may have urgent or frequent urination. You may also have pain, burning when you urinate, and bloody urine.    Urethritis. This is an inflamed urethra, which is the tube that carries urine from the bladder to outside the body. You may have lower stomach or back pain. You may also have urgent or frequent urination.    Pyelonephritis. This is a kidney infection. If not treated, it can be serious and damage your kidneys. In severe cases, you may need to stay in the hospital. You may have a fever and lower back pain.  Medicines to treat a UTI  Most UTIs are treated with antibiotics. These kill the bacteria.  The length of time you need to take them depends on the type of infection. It may be as short as 3 days. If you have repeated UTIs, you may need a low-dose antibiotic for several months. Take antibiotics exactly as directed. Don t stop taking them until all of the medicine is gone. If you stop taking the antibiotic too soon, the infection may not go away. You may also develop a resistance to the antibiotic. This can make it much harder to treat.  Lifestyle changes to treat and prevent UTIs  The lifestyle changes below will help get rid of your UTI. They may also help prevent future UTIs.    Drink plenty of fluids. This includes water, juice, or other caffeine-free drinks. Fluids help flush bacteria out of your body.    Empty your bladder. Always empty your bladder when you feel the urge to urinate. And always urinate before going to sleep. Urine that stays in your bladder can lead to infection. Try to urinate before and after sex as well.    Practice good personal hygiene. Wipe yourself from front to back after using the toilet. This helps keep bacteria from getting into the urethra.    Use condoms during sex. These help prevent UTIs caused by sexually transmitted bacteria. Also don't use spermicides during sex. These can increase the risk for UTIs. Choose other forms of birth control instead. For women who tend to get UTIs after sex, a low-dose of a preventive antibiotic may be used. Be sure to discuss this option with your healthcare provider.    Follow up with your healthcare provider as directed. He or she may test to make sure the infection has cleared. If needed, more treatment may be started.  Date Last Reviewed: 1/1/2017 2000-2019 [employer]. [address] [place] [address], [place] [address]. All rights reserved. This information is not intended as a substitute for professional medical care. Always follow your healthcare professional's instructions.

## 2020-03-19 NOTE — TELEPHONE ENCOUNTER
nitroFURantoin macrocrystal-monohydrate (MACROBID) 100 MG capsule  14 capsule  0  3/18/2020   --    Sig - Route: Take 1 capsule (100 mg) by mouth 2 times daily - Oral    Sent to pharmacy as: nitroFURantoin macrocrystal-monohydrate (MACROBID) 100 MG capsule    Class: E-Prescribe    Order: [id number]    E-Prescribing Status: Receipt confirmed by pharmacy (3/18/2020 11:57 AM CDT)      Duplicate request.   Closing encounter.     Reva Epps RN

## 2020-03-31 ENCOUNTER — E-VISIT (OUTPATIENT)
Dept: FAMILY MEDICINE | Facility: CLINIC | Age: 45
End: 2020-03-31
Payer: COMMERCIAL

## 2020-03-31 DIAGNOSIS — R60.0 PERIPHERAL EDEMA: Primary | ICD-10-CM

## 2020-03-31 PROCEDURE — 99421 OL DIG E/M SVC 5-10 MIN: CPT | Performed by: NURSE PRACTITIONER

## 2020-04-01 DIAGNOSIS — R60.0 PERIPHERAL EDEMA: ICD-10-CM

## 2020-04-01 DIAGNOSIS — E78.5 HYPERLIPIDEMIA LDL GOAL <100: ICD-10-CM

## 2020-04-01 LAB
ANION GAP SERPL CALCULATED.3IONS-SCNC: 4 MMOL/L (ref 3–14)
BUN SERPL-MCNC: 13 MG/DL (ref 7–30)
CALCIUM SERPL-MCNC: 8.8 MG/DL (ref 8.5–10.1)
CHLORIDE SERPL-SCNC: 109 MMOL/L (ref 94–109)
CO2 SERPL-SCNC: 29 MMOL/L (ref 20–32)
CREAT SERPL-MCNC: 0.57 MG/DL (ref 0.52–1.04)
GFR SERPL CREATININE-BSD FRML MDRD: >90 ML/MIN/{1.73_M2}
GLUCOSE SERPL-MCNC: 104 MG/DL (ref 70–99)
POTASSIUM SERPL-SCNC: 4.1 MMOL/L (ref 3.4–5.3)
SODIUM SERPL-SCNC: 142 MMOL/L (ref 133–144)

## 2020-04-01 PROCEDURE — 80048 BASIC METABOLIC PNL TOTAL CA: CPT | Performed by: NURSE PRACTITIONER

## 2020-04-01 PROCEDURE — 36415 COLL VENOUS BLD VENIPUNCTURE: CPT | Performed by: NURSE PRACTITIONER

## 2020-04-02 RX ORDER — FUROSEMIDE 20 MG
20 TABLET ORAL DAILY
Qty: 3 TABLET | Refills: 0 | Status: SHIPPED | OUTPATIENT
Start: 2020-04-02 | End: 2020-05-08

## 2020-04-06 ENCOUNTER — MYC MEDICAL ADVICE (OUTPATIENT)
Dept: FAMILY MEDICINE | Facility: CLINIC | Age: 45
End: 2020-04-06

## 2020-04-08 ENCOUNTER — OFFICE VISIT (OUTPATIENT)
Dept: FAMILY MEDICINE | Facility: CLINIC | Age: 45
End: 2020-04-08
Payer: COMMERCIAL

## 2020-04-08 ENCOUNTER — ANCILLARY PROCEDURE (OUTPATIENT)
Dept: GENERAL RADIOLOGY | Facility: CLINIC | Age: 45
End: 2020-04-08
Attending: NURSE PRACTITIONER
Payer: COMMERCIAL

## 2020-04-08 VITALS
HEART RATE: 75 BPM | BODY MASS INDEX: 30.58 KG/M2 | SYSTOLIC BLOOD PRESSURE: 134 MMHG | TEMPERATURE: 97.7 F | OXYGEN SATURATION: 99 % | WEIGHT: 162 LBS | DIASTOLIC BLOOD PRESSURE: 88 MMHG | HEIGHT: 61 IN

## 2020-04-08 DIAGNOSIS — R60.0 PERIPHERAL EDEMA: Primary | ICD-10-CM

## 2020-04-08 DIAGNOSIS — R60.0 PERIPHERAL EDEMA: ICD-10-CM

## 2020-04-08 DIAGNOSIS — R00.2 PALPITATIONS: ICD-10-CM

## 2020-04-08 LAB
BASOPHILS # BLD AUTO: 0 10E9/L (ref 0–0.2)
BASOPHILS NFR BLD AUTO: 0.2 %
DIFFERENTIAL METHOD BLD: NORMAL
EOSINOPHIL # BLD AUTO: 0.1 10E9/L (ref 0–0.7)
EOSINOPHIL NFR BLD AUTO: 1.7 %
ERYTHROCYTE [DISTWIDTH] IN BLOOD BY AUTOMATED COUNT: 12.6 % (ref 10–15)
HCT VFR BLD AUTO: 36.3 % (ref 35–47)
HGB BLD-MCNC: 12.3 G/DL (ref 11.7–15.7)
LYMPHOCYTES # BLD AUTO: 2.1 10E9/L (ref 0.8–5.3)
LYMPHOCYTES NFR BLD AUTO: 43.8 %
MCH RBC QN AUTO: 29.3 PG (ref 26.5–33)
MCHC RBC AUTO-ENTMCNC: 33.9 G/DL (ref 31.5–36.5)
MCV RBC AUTO: 86 FL (ref 78–100)
MONOCYTES # BLD AUTO: 0.3 10E9/L (ref 0–1.3)
MONOCYTES NFR BLD AUTO: 6.5 %
NEUTROPHILS # BLD AUTO: 2.3 10E9/L (ref 1.6–8.3)
NEUTROPHILS NFR BLD AUTO: 47.8 %
PLATELET # BLD AUTO: 206 10E9/L (ref 150–450)
RBC # BLD AUTO: 4.2 10E12/L (ref 3.8–5.2)
TSH SERPL DL<=0.005 MIU/L-ACNC: 2 MU/L (ref 0.4–4)
WBC # BLD AUTO: 4.8 10E9/L (ref 4–11)

## 2020-04-08 PROCEDURE — 71046 X-RAY EXAM CHEST 2 VIEWS: CPT

## 2020-04-08 PROCEDURE — 93000 ELECTROCARDIOGRAM COMPLETE: CPT | Performed by: NURSE PRACTITIONER

## 2020-04-08 PROCEDURE — 85025 COMPLETE CBC W/AUTO DIFF WBC: CPT | Performed by: NURSE PRACTITIONER

## 2020-04-08 PROCEDURE — 36415 COLL VENOUS BLD VENIPUNCTURE: CPT | Performed by: NURSE PRACTITIONER

## 2020-04-08 PROCEDURE — 84443 ASSAY THYROID STIM HORMONE: CPT | Performed by: NURSE PRACTITIONER

## 2020-04-08 PROCEDURE — 99214 OFFICE O/P EST MOD 30 MIN: CPT | Performed by: NURSE PRACTITIONER

## 2020-04-08 RX ORDER — HYDROCHLOROTHIAZIDE 12.5 MG/1
12.5 TABLET ORAL DAILY PRN
Qty: 90 TABLET | Refills: 3 | Status: SHIPPED | OUTPATIENT
Start: 2020-04-08

## 2020-04-08 ASSESSMENT — MIFFLIN-ST. JEOR: SCORE: 1308.24

## 2020-05-08 ENCOUNTER — OFFICE VISIT (OUTPATIENT)
Dept: FAMILY MEDICINE | Facility: CLINIC | Age: 45
End: 2020-05-08
Payer: COMMERCIAL

## 2020-05-08 VITALS
HEART RATE: 84 BPM | SYSTOLIC BLOOD PRESSURE: 120 MMHG | DIASTOLIC BLOOD PRESSURE: 78 MMHG | HEIGHT: 61 IN | TEMPERATURE: 99.1 F | WEIGHT: 163 LBS | OXYGEN SATURATION: 96 % | BODY MASS INDEX: 30.78 KG/M2

## 2020-05-08 DIAGNOSIS — Z01.818 PREOP GENERAL PHYSICAL EXAM: Primary | ICD-10-CM

## 2020-05-08 DIAGNOSIS — K21.00 GASTROESOPHAGEAL REFLUX DISEASE WITH ESOPHAGITIS: ICD-10-CM

## 2020-05-08 DIAGNOSIS — R60.0 PERIPHERAL EDEMA: ICD-10-CM

## 2020-05-08 DIAGNOSIS — Z72.0 TOBACCO ABUSE: ICD-10-CM

## 2020-05-08 DIAGNOSIS — E78.5 HYPERLIPIDEMIA LDL GOAL <100: ICD-10-CM

## 2020-05-08 LAB
ANION GAP SERPL CALCULATED.3IONS-SCNC: 2 MMOL/L (ref 3–14)
BUN SERPL-MCNC: 14 MG/DL (ref 7–30)
CALCIUM SERPL-MCNC: 8.9 MG/DL (ref 8.5–10.1)
CHLORIDE SERPL-SCNC: 107 MMOL/L (ref 94–109)
CHOLEST SERPL-MCNC: 264 MG/DL
CO2 SERPL-SCNC: 31 MMOL/L (ref 20–32)
CREAT SERPL-MCNC: 0.69 MG/DL (ref 0.52–1.04)
GFR SERPL CREATININE-BSD FRML MDRD: >90 ML/MIN/{1.73_M2}
GLUCOSE SERPL-MCNC: 74 MG/DL (ref 70–99)
HDLC SERPL-MCNC: 50 MG/DL
LDLC SERPL CALC-MCNC: 152 MG/DL
NONHDLC SERPL-MCNC: 214 MG/DL
POTASSIUM SERPL-SCNC: 3.7 MMOL/L (ref 3.4–5.3)
SODIUM SERPL-SCNC: 140 MMOL/L (ref 133–144)
TRIGL SERPL-MCNC: 310 MG/DL

## 2020-05-08 PROCEDURE — 36415 COLL VENOUS BLD VENIPUNCTURE: CPT | Performed by: NURSE PRACTITIONER

## 2020-05-08 PROCEDURE — 99214 OFFICE O/P EST MOD 30 MIN: CPT | Performed by: NURSE PRACTITIONER

## 2020-05-08 PROCEDURE — 80061 LIPID PANEL: CPT | Performed by: NURSE PRACTITIONER

## 2020-05-08 PROCEDURE — 80048 BASIC METABOLIC PNL TOTAL CA: CPT | Performed by: NURSE PRACTITIONER

## 2020-05-08 ASSESSMENT — MIFFLIN-ST. JEOR: SCORE: 1312.77

## 2020-05-08 NOTE — PROGRESS NOTES
[facility]  [address] [place] [id number]  [phone]  Dept: [phone]    PRE-OP EVALUATION:  Today's date: 2020    Yvonne Zarco (: 1975) presents for pre-operative evaluation assessment as requested by Lobito Peterson MD.  She requires evaluation and anesthesia risk assessment prior to undergoing surgery/procedure for treatment of EGD .    Fax number for surgical facility: [phone]  Primary Physician: Rima Mccollum  Type of Anesthesia Anticipated: to be determined    Patient has a Health Care Directive or Living Will:  NO    Preop Questions 2020   Who is doing your surgery? Dr. Lobito Orozco   What are you having done? EGD   Date of Surgery/Procedure: 05/15/2020   Facility or Hospital where procedure/surgery will be performed: [facility]   1.  Do you have a history of Heart attack, stroke, stent, coronary bypass surgery, or other heart surgery? No   2.  Do you ever have any pain or discomfort in your chest? No   3.  Do you have a history of  Heart Failure? No   4.   Are you troubled by shortness of breath when:  walking on a level surface, or up a slight hill, or at night? No   5.  Do you currently have a cold, bronchitis or other respiratory infection? No   6.  Do you have a cough, shortness of breath, or wheezing? No   7.  Do you sometimes get pains in the calves of your legs when you walk? No   8. Do you or anyone in your family have previous history of blood clots? No   9.  Do you or does anyone in your family have a serious bleeding problem such as prolonged bleeding following surgeries or cuts? No   10. Have you ever had problems with anemia or been told to take iron pills? No   11. Have you had any abnormal blood loss such as black, tarry or bloody stools, or abnormal vaginal bleeding? No   12. Have you ever had a blood transfusion? No   13. Have you or any of your relatives ever had problems with anesthesia?  YES - Tends to vomit after anesthesia   14. Do you have sleep apnea, excessive snoring or daytime drowsiness? No   15. Do you have any prosthetic heart valves? No   16. Do you have prosthetic joints? No   17. Is there any chance that you may be pregnant? No         HPI:     HPI related to upcoming procedure: Has chronic GERD with prior LINX device and gastric sleeve and will have EGD to determine if LINX functioning or lazaro-en-y is needed. Has been on Nexium chronically with breakthrough symptoms.       See problem list for active medical problems.  Problems all longstanding and stable, except as noted/documented.  See ROS for pertinent symptoms related to these conditions.      MEDICAL HISTORY:     Patient Active Problem List    Diagnosis Date Noted     Peripheral edema 2020     Priority: Medium     Celiac artery aneurysm (H) 2019     Priority: Medium     Osteopenia, unspecified location 2018     Priority: Medium     Complex cyst of left ovary 2018     Priority: Medium     Bariatric surgery status 2014     Priority: Medium     Gastric sleeve 2013       Anxiety 2014     Priority: Medium     Tobacco use disorder 2014     Priority: Medium     GERD (gastroesophageal reflux disease) 12/10/2012     Priority: Medium      Past Medical History:   Diagnosis Date     Depressive disorder forever     Endometriosis     found at time of abdominal hysterectomy     Gastro-oesophageal reflux disease      Hiatal hernia      Hypertension      PONV (postoperative nausea and vomiting)      Past Surgical History:   Procedure Laterality Date      SECTION      x2     CHOLECYSTECTOMY       ESOPHAGOSCOPY, GASTROSCOPY, DUODENOSCOPY (EGD), COMBINED  2012    Procedure: COMBINED ESOPHAGOSCOPY, GASTROSCOPY, DUODENOSCOPY (EGD), BIOPSY SINGLE OR MULTIPLE;;  Surgeon: Scott Seth MD;  Location:  GI     GYN SURGERY      tubal ligation     hysterectomy with bladder sling      left  ovaries     HYSTERECTOMY, PAP NO LONGER INDICATED      Abdominal hyst     LAPAROSCOPIC GASTRIC SLEEVE  5/10/2013    Procedure: LAPAROSCOPIC GASTRIC SLEEVE;  Laparoscopic Sleeve Gastrectomy with hiatal hernia repair;  Surgeon: Scott Seth MD;  Location: [facility] OR     LAPAROSCOPIC OOPHORECTOMY Left 5/31/2018    Procedure: LAPAROSCOPIC OOPHORECTOMY;  Adhesiolysis and Left Laparoscopic Oophorectomy;  Surgeon: Maritza Fowler MD;  Location: [facility] OR     LASIK BILATERAL  2000     ORTHOPEDIC SURGERY      right foot surgery     Current Outpatient Medications   Medication Sig Dispense Refill     buPROPion (WELLBUTRIN SR) 150 MG 12 hr tablet Take 1 tablet (150 mg) by mouth 2 times daily Take 1 tablet once daily and increase to 1 tablet twice daily after 4 to 7 days 180 tablet 3     calcium carbonate (TUMS) 500 MG chewable tablet Take 2 chew tab by mouth 2 times daily as needed for heartburn       calcium Citrate-vitamin D 500-400 MG-UNIT CHEW Take 500 mg by mouth 2 times daily 60 tablet prn     Cyanocobalamin (VITAMIN B 12 PO) Take 500 mcg by mouth daily        hydrochlorothiazide (HYDRODIURIL) 12.5 MG tablet Take 1 tablet (12.5 mg) by mouth daily as needed (swelling) 90 tablet 3     metoprolol succinate ER (TOPROL-XL) 25 MG 24 hr tablet Take 1 tablet (25 mg) by mouth daily 90 tablet 3     mometasone (NASONEX) 50 MCG/ACT spray Spray 2 sprays into both nostrils daily       Multiple Vitamin (DAILY MULTIVITAMIN PO) Take  by mouth. Take 2 chewable tablets daily       NEXIUM 24HR 20 MG DR capsule Take 1 capsule (20 mg) by mouth Twice a Day, 30-60 minutes before eating. (Patient taking differently: 40 mg 2 times daily ) 84 capsule 8     PARoxetine (PAXIL) 40 MG tablet Take 0.5 tablets (20 mg) by mouth At Bedtime 45 tablet 3     simvastatin (ZOCOR) 10 MG tablet Take 1 tablet (10 mg) by mouth At Bedtime 90 tablet 3     clopidogrel (PLAVIX) 75 MG tablet Take 1 tablet (75 mg) by mouth daily (Patient not taking: Reported on  "5/8/2020) 90 tablet 3     Protein (UNJURY) POWD Take 21 g by mouth once daily. (Patient not taking: Reported on 3/13/2020) 924 g 11     OTC products: None, except as noted above    Allergies   Allergen Reactions     Ciprofloxacin Itching     Dilaudid [Hydromorphone] Itching     Amoxicillin Blisters and Rash      Latex Allergy: NO    Social History     Tobacco Use     Smoking status: Current Some Day Smoker     Packs/day: 0.50     Years: 20.00     Pack years: 10.00     Types: Cigarettes     Smokeless tobacco: Never Used     Tobacco comment: Tried many times; 3-5cigarettes/day    Substance Use Topics     Alcohol use: No     Comment: seldom      History   Drug Use No       REVIEW OF SYSTEMS:   Constitutional, neuro, ENT, endocrine, pulmonary, cardiac, gastrointestinal, genitourinary, musculoskeletal, integument and psychiatric systems are negative, except as otherwise noted.    EXAM:   /78 (BP Location: Left arm, Patient Position: Chair, Cuff Size: Adult Regular)   Pulse 84   Temp 99.1  F (37.3  C) (Tympanic)   Ht 1.543 m (5' 0.75\")   Wt 73.9 kg (163 lb)   SpO2 96%   BMI 31.05 kg/m      GENERAL APPEARANCE: healthy, alert and no distress     EYES: EOMI, PERRL     HENT: ear canals and TM's normal and nose and mouth without ulcers or lesions     NECK: no adenopathy, no asymmetry, masses, or scars and thyroid normal to palpation     RESP: lungs clear to auscultation - no rales, rhonchi or wheezes     CV: regular rates and rhythm, normal S1 S2, no S3 or S4 and no murmur, click or rub     ABDOMEN:  soft, nontender, no HSM or masses and bowel sounds normal     MS: extremities normal- no gross deformities noted, no evidence of inflammation in joints, FROM in all extremities.     SKIN: no suspicious lesions or rashes     NEURO: Normal strength and tone, sensory exam grossly normal, mentation intact and speech normal     PSYCH: mentation appears normal. and affect normal/bright     LYMPHATICS: No cervical " adenopathy    DIAGNOSTICS:     EKG: Not indicated due to non-vascular surgery and low risk of event (age <65 and without cardiac risk factors)  Labs Resulted Today: No results found for any visits on 05/08/20.  Labs Drawn and in Process:   Unresulted Labs Ordered in the Past 30 Days of this Admission     Date and Time Order Name Status Description    5/8/2020 1525 LIPID REFLEX TO DIRECT LDL PANEL In process     5/8/2020 1525 BASIC METABOLIC PANEL In process           Recent Labs   Lab Test 04/08/20  1524 04/01/20  1233 01/08/20  0800  03/16/15  0736 05/23/14  0804   HGB 12.3  --  13.5   < > 12.8 14.0     --  184   < >  --   --    NA  --  142 143   < > 142 141   POTASSIUM  --  4.1 4.6   < > 4.1 4.0   CR  --  0.57 0.66   < > 0.57 0.57   A1C  --   --   --   --  4.5 4.7    < > = values in this interval not displayed.        IMPRESSION:   Reason for surgery/procedure: GERD  Diagnosis/reason for consult: evaluate perioperative risk    The proposed surgical procedure is considered INTERMEDIATE risk.    REVISED CARDIAC RISK INDEX  The patient has the following serious cardiovascular risks for perioperative complications such as (MI, PE, VFib and 3  AV Block):  No serious cardiac risks  INTERPRETATION: 0 risks: Class I (very low risk - 0.4% complication rate)    The patient has the following additional risks for perioperative complications:  No identified additional risks      ICD-10-CM    1. Preop general physical exam  Z01.818    2. Gastroesophageal reflux disease with esophagitis  K21.0    3. Peripheral edema  R60.9 Basic metabolic panel   4. Hyperlipidemia LDL goal <100  E78.5 Lipid panel reflex to direct LDL Fasting   5. Tobacco abuse  Z72.0        RECOMMENDATIONS:         --Patient is to take all scheduled medications on the day of surgery EXCEPT for modifications listed below.  Hold Aspirin for 7 days.    APPROVAL GIVEN to proceed with proposed procedure, without further diagnostic evaluation       Signed  Electronically by: ISH Zurita CNP    Copy of this evaluation report is provided to requesting physician.    [place] Preop Guidelines    Revised Cardiac Risk Index

## 2020-05-08 NOTE — PATIENT INSTRUCTIONS
Hold your aspirin and your multivitamins before surgery. You can take everything else.    Before Your Surgery      Call your surgeon if there is any change in your health. This includes signs of a cold or flu (such as a sore throat, runny nose, cough, rash or fever).    Do not smoke, drink alcohol or take over the counter medicine (unless your surgeon or primary care doctor tells you to) for the 24 hours before and after surgery.    If you take prescribed drugs: Follow your doctor s orders about which medicines to take and which to stop until after surgery.    Eating and drinking prior to surgery: follow the instructions from your surgeon    Take a shower or bath the night before surgery. Use the soap your surgeon gave you to gently clean your skin. If you do not have soap from your surgeon, use your regular soap. Do not shave or scrub the surgery site.  Wear clean pajamas and have clean sheets on your bed.

## 2020-05-11 RX ORDER — SIMVASTATIN 20 MG
20 TABLET ORAL AT BEDTIME
Qty: 90 TABLET | Refills: 1 | Status: SHIPPED | OUTPATIENT
Start: 2020-05-11

## 2020-05-12 ENCOUNTER — OFFICE VISIT (OUTPATIENT)
Dept: URGENT CARE | Facility: URGENT CARE | Age: 45
End: 2020-05-12
Payer: COMMERCIAL

## 2020-05-12 DIAGNOSIS — Z20.822 SUSPECTED COVID-19 VIRUS INFECTION: Primary | ICD-10-CM

## 2020-05-12 PROCEDURE — 99000 SPECIMEN HANDLING OFFICE-LAB: CPT | Performed by: FAMILY MEDICINE

## 2020-05-12 PROCEDURE — 87635 SARS-COV-2 COVID-19 AMP PRB: CPT | Mod: 90 | Performed by: FAMILY MEDICINE

## 2020-05-12 PROCEDURE — 99207 ZZC NO BILLABLE SERVICE THIS VISIT: CPT

## 2020-05-14 LAB
SARS-COV-2 RNA SPEC QL NAA+PROBE: NOT DETECTED
SPECIMEN SOURCE: NORMAL

## 2020-05-24 ENCOUNTER — TRANSFERRED RECORDS (OUTPATIENT)
Dept: HEALTH INFORMATION MANAGEMENT | Facility: CLINIC | Age: 45
End: 2020-05-24

## 2020-05-25 ENCOUNTER — TELEPHONE (OUTPATIENT)
Dept: NURSING | Facility: CLINIC | Age: 45
End: 2020-05-25

## 2020-05-25 NOTE — TELEPHONE ENCOUNTER
"Yvonne calling because she said that she keep experiencing muscle \"spasms\" in her wrist when she doses off to sleep and it awakens her.   She is wondering if she she could get a muscle relaxer prescribed. Advised that she will need to be evaluated for a prescription either in ER or UC. She will be having surgery at [facility] next week.  Steph Roberts RN  [facility]    "

## 2020-05-27 ENCOUNTER — TRANSFERRED RECORDS (OUTPATIENT)
Dept: HEALTH INFORMATION MANAGEMENT | Facility: CLINIC | Age: 45
End: 2020-05-27

## 2020-06-08 ENCOUNTER — TRANSFERRED RECORDS (OUTPATIENT)
Dept: HEALTH INFORMATION MANAGEMENT | Facility: CLINIC | Age: 45
End: 2020-06-08

## 2020-06-22 ENCOUNTER — ANCILLARY PROCEDURE (OUTPATIENT)
Dept: MAMMOGRAPHY | Facility: CLINIC | Age: 45
End: 2020-06-22
Payer: COMMERCIAL

## 2020-06-22 DIAGNOSIS — Z12.31 ENCOUNTER FOR SCREENING MAMMOGRAM FOR BREAST CANCER: ICD-10-CM

## 2020-06-22 PROCEDURE — 77063 BREAST TOMOSYNTHESIS BI: CPT | Mod: TC

## 2020-06-22 PROCEDURE — 77067 SCR MAMMO BI INCL CAD: CPT | Mod: TC

## 2020-06-23 ENCOUNTER — MYC MEDICAL ADVICE (OUTPATIENT)
Dept: FAMILY MEDICINE | Facility: CLINIC | Age: 45
End: 2020-06-23

## 2020-06-23 NOTE — TELEPHONE ENCOUNTER
Please see [employer] message.   Patient last had potassium checked on 5/8/2020 and WNL.     Reva Epps RN

## 2020-06-24 ENCOUNTER — OFFICE VISIT (OUTPATIENT)
Dept: OPTOMETRY | Facility: CLINIC | Age: 45
End: 2020-06-24
Payer: COMMERCIAL

## 2020-06-24 DIAGNOSIS — H52.4 PRESBYOPIA: ICD-10-CM

## 2020-06-24 DIAGNOSIS — Z01.01 ENCOUNTER FOR EXAMINATION OF EYES AND VISION WITH ABNORMAL FINDINGS: Primary | ICD-10-CM

## 2020-06-24 DIAGNOSIS — H52.223 REGULAR ASTIGMATISM OF BOTH EYES: ICD-10-CM

## 2020-06-24 DIAGNOSIS — H52.13 MYOPIA OF BOTH EYES: ICD-10-CM

## 2020-06-24 DIAGNOSIS — Z98.890 S/P LASIK SURGERY: ICD-10-CM

## 2020-06-24 PROCEDURE — 92014 COMPRE OPH EXAM EST PT 1/>: CPT | Performed by: OPTOMETRIST

## 2020-06-24 PROCEDURE — 92015 DETERMINE REFRACTIVE STATE: CPT | Performed by: OPTOMETRIST

## 2020-06-24 ASSESSMENT — VISUAL ACUITY
METHOD: SNELLEN - LINEAR
CORRECTION_TYPE: GLASSES
OD_CC: 20/20
OS_CC: 20/60
OD_CC+: -2
OD_SC: 20/100
OD_CC: 20/30-1
OS_CC: 20/20
OS_SC: 20/100
OD_SC+: -1
OS_CC+: -1

## 2020-06-24 ASSESSMENT — TONOMETRY
OS_IOP_MMHG: 13
IOP_METHOD: APPLANATION
OD_IOP_MMHG: 13

## 2020-06-24 ASSESSMENT — SLIT LAMP EXAM - LIDS
COMMENTS: NORMAL
COMMENTS: NORMAL

## 2020-06-24 ASSESSMENT — REFRACTION_WEARINGRX
OS_CYLINDER: +1.00
OD_ADD: +1.00
OS_CYLINDER: +1.00
OS_ADD: +2.25
OS_SPHERE: -1.00
OS_SPHERE: -2.25
OD_SPHERE: -1.00
OS_AXIS: 085
OS_ADD: +1.00
SPECS_TYPE: PAL
OD_ADD: +2.25
OD_AXIS: 098
OD_CYLINDER: +1.75
OD_AXIS: 098
OD_SPHERE: -2.25
OS_AXIS: 085
OD_CYLINDER: +1.75

## 2020-06-24 ASSESSMENT — REFRACTION_MANIFEST
OS_SPHERE: -2.00
OD_CYLINDER: +1.75
OS_AXIS: 089
OS_AXIS: 091
OD_CYLINDER: +1.50
OD_ADD: +2.50
OS_ADD: +2.50
METHOD_AUTOREFRACTION: 1
OD_SPHERE: -2.25
OD_AXIS: 090
OS_SPHERE: -2.00
OD_AXIS: 095
OS_CYLINDER: +1.00
OS_CYLINDER: +1.00
OD_SPHERE: -2.00

## 2020-06-24 ASSESSMENT — CONF VISUAL FIELD
OS_NORMAL: 1
OD_NORMAL: 1

## 2020-06-24 ASSESSMENT — EXTERNAL EXAM - RIGHT EYE: OD_EXAM: NORMAL

## 2020-06-24 ASSESSMENT — EXTERNAL EXAM - LEFT EYE: OS_EXAM: NORMAL

## 2020-06-24 ASSESSMENT — CUP TO DISC RATIO
OS_RATIO: 0.25
OD_RATIO: 0.3

## 2020-06-24 NOTE — PATIENT INSTRUCTIONS
Updated glasses prescriptions provided today. Both distance/near and computer/near bifocal prescriptions.     The effects of the dilating drops last for 4- 6 hours.  You will be more sensitive to light and vision will be blurry up close.  Mydriatic sunglasses were given if needed.    Return in 1-2 years for comprehensive eye exam, or sooner if needed.     Jason Becker O.D.  [facility]  [address] [place] [address] [address] [place], [place] [phone]

## 2020-06-24 NOTE — PROGRESS NOTES
Chief Complaint   Patient presents with     Annual Eye Exam      Accompanied by self  Last Eye Exam: 11-  Dilated Previously: Yes    What are you currently using to see?  glasses and computer glasses       Distance Vision Acuity: Satisfied with vision    Near Vision Acuity: Not satisfied,cell phone    Eye Comfort: good  Do you use eye drops? : No  Occupation or Hobbies: [employer] prior authorizations    Ana Redman Optometric Assistant, A.B.O.C.          Medical, surgical and family histories reviewed and updated 6/24/2020.       OBJECTIVE: See Ophthalmology exam    ASSESSMENT:    ICD-10-CM    1. Encounter for examination of eyes and vision with abnormal findings  Z01.01    2. S/P LASIK surgery  Z98.890    3. Myopia of both eyes  H52.13    4. Regular astigmatism of both eyes  H52.223    5. Presbyopia  H52.4       PLAN:     Patient Instructions   Updated glasses prescriptions provided today. Both distance/near and computer/near bifocal prescriptions.     The effects of the dilating drops last for 4- 6 hours.  You will be more sensitive to light and vision will be blurry up close.  Mydriatic sunglasses were given if needed.    Return in 1-2 years for comprehensive eye exam, or sooner if needed.     Jason Becker O.D.  [facility]  [address] [place] [address] [address], [place] [phone]

## 2020-06-24 NOTE — LETTER
6/24/2020         RE: Yvonne Zarco  [address]  [place] [facility] [address]        Dear Colleague,    Thank you for referring your patient, Yvonne Zarco, to the [facility]. Please see a copy of my visit note below.    Chief Complaint   Patient presents with     Annual Eye Exam      Accompanied by self  Last Eye Exam: 11-  Dilated Previously: Yes    What are you currently using to see?  glasses and computer glasses       Distance Vision Acuity: Satisfied with vision    Near Vision Acuity: Not satisfied,cell phone    Eye Comfort: good  Do you use eye drops? : No  Occupation or Hobbies: [employer] prior authorizations    Aan Redman Optometric Assistant, A.B.O.C.          Medical, surgical and family histories reviewed and updated 6/24/2020.       OBJECTIVE: See Ophthalmology exam    ASSESSMENT:    ICD-10-CM    1. Encounter for examination of eyes and vision with abnormal findings  Z01.01    2. S/P LASIK surgery  Z98.890    3. Myopia of both eyes  H52.13    4. Regular astigmatism of both eyes  H52.223    5. Presbyopia  H52.4       PLAN:     Patient Instructions   Updated glasses prescriptions provided today. Both distance/near and computer/near bifocal prescriptions.     The effects of the dilating drops last for 4- 6 hours.  You will be more sensitive to light and vision will be blurry up close.  Mydriatic sunglasses were given if needed.    Return in 1-2 years for comprehensive eye exam, or sooner if needed.     Jason Becker O.D.  [address] [place], [place] [phone]             Again, thank you for allowing me to participate in the care of your patient.        Sincerely,        Jason Becker, ADELAIDE

## 2020-06-25 DIAGNOSIS — Z98.84 BARIATRIC SURGERY STATUS: Primary | ICD-10-CM

## 2020-06-30 DIAGNOSIS — Z98.84 BARIATRIC SURGERY STATUS: ICD-10-CM

## 2020-06-30 DIAGNOSIS — E78.5 HYPERLIPIDEMIA LDL GOAL <100: ICD-10-CM

## 2020-06-30 LAB — DEPRECATED CALCIDIOL+CALCIFEROL SERPL-MC: 32 UG/L (ref 20–75)

## 2020-06-30 PROCEDURE — 36415 COLL VENOUS BLD VENIPUNCTURE: CPT | Performed by: SURGERY

## 2020-06-30 PROCEDURE — 82306 VITAMIN D 25 HYDROXY: CPT | Performed by: SURGERY

## 2020-07-06 ENCOUNTER — TRANSFERRED RECORDS (OUTPATIENT)
Dept: HEALTH INFORMATION MANAGEMENT | Facility: CLINIC | Age: 45
End: 2020-07-06

## 2020-07-27 ENCOUNTER — TRANSFERRED RECORDS (OUTPATIENT)
Dept: HEALTH INFORMATION MANAGEMENT | Facility: CLINIC | Age: 45
End: 2020-07-27

## 2020-11-06 NOTE — PROGRESS NOTES
"Subjective     Yvonne Zarco is a 44 year old female who presents to clinic today for the following health issues:    HPI     Swelling      Duration: since after she took the antibiotic for her UTI 03/18/2020    Description (location/character/radiation): swelling in hands and ankles    Intensity:  moderate    Accompanying signs and symptoms: none    History (similar episodes/previous evaluation): None    Precipitating or alleviating factors: None    Therapies tried and outcome: Lasix which initially helped     Patient presents for swelling of hands and feet which began suddenly after taking Macrobid for UTI. Had BMP done, which demonstrated normal renal function. Lasix given for 3 days did help but symptoms return when she stopped the med. Has had palpitations ongoing for months, which only occur when at rest and have no associated symptoms.     Denies shortness of breath, chest pain, rapid weight gain, orthopnea/PND.    Reviewed and updated as needed this visit by Provider         Review of Systems   ROS COMP: Constitutional, HEENT, cardiovascular, pulmonary, GI, , musculoskeletal, neuro, skin, endocrine and psych systems are negative, except as otherwise noted.      Objective    /88 (BP Location: Right arm, Patient Position: Chair, Cuff Size: Adult Regular)   Pulse 75   Temp 97.7  F (36.5  C) (Oral)   Ht 1.543 m (5' 0.75\")   Wt 73.5 kg (162 lb)   SpO2 99%   BMI 30.86 kg/m    Body mass index is 30.86 kg/m .  Physical Exam   GENERAL: healthy, alert and no distress  RESP: lungs clear to auscultation - no rales, rhonchi or wheezes  CV: regular rates and rhythm, normal S1 S2, no S3 or S4, no murmur, click or rub, peripheral pulses strong and trace edema of hands/wrists and bilateral feet/ankles  ABDOMEN: soft, nontender, no hepatosplenomegaly, no masses and bowel sounds normal  PSYCH: mentation appears normal, affect normal/bright    Diagnostic Test Results:  Labs reviewed in Epic  EKG- " unremarkable  CXR- unremarkable  Results for orders placed or performed in visit on 04/08/20 (from the past 24 hour(s))   CBC with platelets and differential   Result Value Ref Range    WBC 4.8 4.0 - 11.0 10e9/L    RBC Count 4.20 3.8 - 5.2 10e12/L    Hemoglobin 12.3 11.7 - 15.7 g/dL    Hematocrit 36.3 35.0 - 47.0 %    MCV 86 78 - 100 fl    MCH 29.3 26.5 - 33.0 pg    MCHC 33.9 31.5 - 36.5 g/dL    RDW 12.6 10.0 - 15.0 %    Platelet Count 206 150 - 450 10e9/L    % Neutrophils 47.8 %    % Lymphocytes 43.8 %    % Monocytes 6.5 %    % Eosinophils 1.7 %    % Basophils 0.2 %    Absolute Neutrophil 2.3 1.6 - 8.3 10e9/L    Absolute Lymphocytes 2.1 0.8 - 5.3 10e9/L    Absolute Monocytes 0.3 0.0 - 1.3 10e9/L    Absolute Eosinophils 0.1 0.0 - 0.7 10e9/L    Absolute Basophils 0.0 0.0 - 0.2 10e9/L    Diff Method Automated Method            Assessment & Plan     1. Peripheral edema  Normal work-up thus far. Possibly related to acute kidney injury from macrobid but has normal kidney function despite persistent symptoms. Possibly due to menopausal hormonal changes? Ok to start hydrochlorothiazide PRN. Patient to follow up for labs in 1 month if using med most day; ok to defer labs if not needing to take regularly.  - CBC with platelets and differential  - TSH with free T4 reflex  - XR Chest 2 Views; Future  - EKG 12-lead complete w/read - Clinics  - hydrochlorothiazide (HYDRODIURIL) 12.5 MG tablet; Take 1 tablet (12.5 mg) by mouth daily as needed (swelling)  Dispense: 90 tablet; Refill: 3  - Basic metabolic panel; Future    2. Palpitations  Mild and without other symptoms. Patient to follow up if new symptoms develop or becomes more persistent.  - EKG 12-lead complete w/read - Clinics       See Patient Instructions    No follow-ups on file.    Rima Mccollum, APRN [facility]           Asthma    PCOS (polycystic ovarian syndrome)

## 2021-01-14 ENCOUNTER — HEALTH MAINTENANCE LETTER (OUTPATIENT)
Age: 46
End: 2021-01-14

## 2021-01-28 DIAGNOSIS — I77.79 CELIAC ARTERY DISSECTION (H): Primary | ICD-10-CM

## 2021-02-19 DIAGNOSIS — E78.5 HYPERLIPIDEMIA LDL GOAL <100: ICD-10-CM

## 2021-02-22 RX ORDER — SIMVASTATIN 20 MG
20 TABLET ORAL AT BEDTIME
Qty: 90 TABLET | Refills: 1 | OUTPATIENT
Start: 2021-02-22

## 2021-02-22 RX ORDER — SIMVASTATIN 10 MG
10 TABLET ORAL AT BEDTIME
Qty: 90 TABLET | Refills: 3 | OUTPATIENT
Start: 2021-02-22

## 2021-03-14 ENCOUNTER — HEALTH MAINTENANCE LETTER (OUTPATIENT)
Age: 46
End: 2021-03-14

## 2021-03-16 ENCOUNTER — TELEPHONE (OUTPATIENT)
Dept: OTHER | Facility: CLINIC | Age: 46
End: 2021-03-16

## 2021-03-16 NOTE — TELEPHONE ENCOUNTER
2+ attempts have been made to schedule follow up with Dr. Fleming. No further attempts to be made.     Attempts:   2/5/2021 1st LS  3/2/2021 2nd LS    Tanya MCLEOD

## 2021-08-29 ENCOUNTER — HEALTH MAINTENANCE LETTER (OUTPATIENT)
Age: 46
End: 2021-08-29

## 2021-10-24 ENCOUNTER — HEALTH MAINTENANCE LETTER (OUTPATIENT)
Age: 46
End: 2021-10-24

## 2022-03-24 ENCOUNTER — TRANSFERRED RECORDS (OUTPATIENT)
Dept: HEALTH INFORMATION MANAGEMENT | Facility: CLINIC | Age: 47
End: 2022-03-24
Payer: COMMERCIAL

## 2022-04-09 ENCOUNTER — HEALTH MAINTENANCE LETTER (OUTPATIENT)
Age: 47
End: 2022-04-09

## 2022-09-28 NOTE — TELEPHONE ENCOUNTER
Attempted to contact patient- please connect her with me if she calls back.  Rima Mccollum, CNP    Opt out

## 2022-10-10 ENCOUNTER — HEALTH MAINTENANCE LETTER (OUTPATIENT)
Age: 47
End: 2022-10-10

## 2022-12-26 ENCOUNTER — TRANSCRIBE ORDERS (OUTPATIENT)
Dept: OTHER | Age: 47
End: 2022-12-26

## 2022-12-26 DIAGNOSIS — M76.891 HAMSTRING TENDONITIS OF RIGHT THIGH: ICD-10-CM

## 2022-12-26 DIAGNOSIS — M76.899 QUADRICEPS TENDONITIS: Primary | ICD-10-CM

## 2022-12-26 DIAGNOSIS — M76.01 GLUTEAL TENDINITIS OF RIGHT BUTTOCK: ICD-10-CM

## 2022-12-26 DIAGNOSIS — R29.898 WEAKNESS OF RIGHT HIP: ICD-10-CM

## 2022-12-26 DIAGNOSIS — M62.81 QUADRICEPS WEAKNESS: ICD-10-CM

## 2022-12-26 DIAGNOSIS — M24.151 DEGENERATIVE TEAR OF ACETABULAR LABRUM OF RIGHT HIP: ICD-10-CM

## 2023-01-07 ENCOUNTER — THERAPY VISIT (OUTPATIENT)
Dept: PHYSICAL THERAPY | Facility: CLINIC | Age: 48
End: 2023-01-07
Payer: COMMERCIAL

## 2023-01-07 DIAGNOSIS — R29.898 WEAKNESS OF RIGHT HIP: ICD-10-CM

## 2023-01-07 DIAGNOSIS — M24.151 DEGENERATIVE TEAR OF ACETABULAR LABRUM OF RIGHT HIP: ICD-10-CM

## 2023-01-07 DIAGNOSIS — M76.01 GLUTEAL TENDINITIS OF RIGHT BUTTOCK: ICD-10-CM

## 2023-01-07 DIAGNOSIS — M76.899 QUADRICEPS TENDONITIS: ICD-10-CM

## 2023-01-07 DIAGNOSIS — M62.81 QUADRICEPS WEAKNESS: ICD-10-CM

## 2023-01-07 DIAGNOSIS — M76.891 HAMSTRING TENDONITIS OF RIGHT THIGH: ICD-10-CM

## 2023-01-07 PROCEDURE — 97161 PT EVAL LOW COMPLEX 20 MIN: CPT | Mod: GP | Performed by: PHYSICAL THERAPIST

## 2023-01-07 PROCEDURE — 97110 THERAPEUTIC EXERCISES: CPT | Mod: GP | Performed by: PHYSICAL THERAPIST

## 2023-01-07 PROCEDURE — 97530 THERAPEUTIC ACTIVITIES: CPT | Mod: GP | Performed by: PHYSICAL THERAPIST

## 2023-01-07 ASSESSMENT — ACTIVITIES OF DAILY LIVING (ADL)
GOING_UP_1_FLIGHT_OF_STAIRS: NO DIFFICULTY AT ALL
HOS_ADL_SCORE(%): 79.69
WALKING_DOWN_STEEP_HILLS: NO DIFFICULTY AT ALL
GETTING_INTO_AND_OUT_OF_AN_AVERAGE_CAR: NO DIFFICULTY AT ALL
GETTING_INTO_AND_OUT_OF_A_BATHTUB: NO DIFFICULTY AT ALL
PUTTING_ON_SOCKS_AND_SHOES: SLIGHT DIFFICULTY
STEPPING_UP_AND_DOWN_CURBS: SLIGHT DIFFICULTY
RECREATIONAL_ACTIVITIES: MODERATE DIFFICULTY
TWISTING/PIVOTING_ON_INVOLVED_LEG: SLIGHT DIFFICULTY
HOW_WOULD_YOU_RATE_YOUR_CURRENT_LEVEL_OF_FUNCTION_DURING_YOUR_USUAL_ACTIVITIES_OF_DAILY_LIVING_FROM_0_TO_100_WITH_100_BEING_YOUR_LEVEL_OF_FUNCTION_PRIOR_TO_YOUR_HIP_PROBLEM_AND_0_BEING_THE_INABILITY_TO_PERFORM_ANY_OF_YOUR_USUAL_DAILY_ACTIVITIES?: 90
HOS_ADL_COUNT: 16
WALKING_15_MINUTES_OR_GREATER: MODERATE DIFFICULTY
GOING_DOWN_1_FLIGHT_OF_STAIRS: NO DIFFICULTY AT ALL
LIGHT_TO_MODERATE_WORK: SLIGHT DIFFICULTY
HOS_ADL_ITEM_SCORE_TOTAL: 51
STANDING_FOR_15_MINUTES: SLIGHT DIFFICULTY
WALKING_UP_STEEP_HILLS: NO DIFFICULTY AT ALL
HEAVY_WORK: SLIGHT DIFFICULTY
HOS_ADL_HIGHEST_POTENTIAL_SCORE: 64
SITTING_FOR_15_MINUTES: SLIGHT DIFFICULTY
ROLLING_OVER_IN_BED: MODERATE DIFFICULTY
WALKING_APPROXIMATELY_10_MINUTES: MODERATE DIFFICULTY
WALKING_INITIALLY: NO DIFFICULTY AT ALL
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
## 2023-02-20 ENCOUNTER — OFFICE VISIT (OUTPATIENT)
Dept: OPTOMETRY | Facility: CLINIC | Age: 48
End: 2023-02-20
Payer: COMMERCIAL

## 2023-02-20 DIAGNOSIS — H52.4 PRESBYOPIA: ICD-10-CM

## 2023-02-20 DIAGNOSIS — Z01.01 ENCOUNTER FOR EXAMINATION OF EYES AND VISION WITH ABNORMAL FINDINGS: Primary | ICD-10-CM

## 2023-02-20 DIAGNOSIS — Z98.890 S/P LASIK SURGERY: ICD-10-CM

## 2023-02-20 DIAGNOSIS — H52.13 MYOPIA OF BOTH EYES: ICD-10-CM

## 2023-02-20 DIAGNOSIS — H52.223 REGULAR ASTIGMATISM OF BOTH EYES: ICD-10-CM

## 2023-02-20 PROCEDURE — 92015 DETERMINE REFRACTIVE STATE: CPT | Performed by: OPTOMETRIST

## 2023-02-20 PROCEDURE — 92014 COMPRE OPH EXAM EST PT 1/>: CPT | Performed by: OPTOMETRIST

## 2023-02-20 ASSESSMENT — REFRACTION_WEARINGRX
OS_CYLINDER: +1.50
OD_AXIS: 080
OD_CYLINDER: +1.50
OS_SPHERE: -2.00
OS_AXIS: 090
OS_ADD: +1.75
OD_ADD: +1.75
OD_SPHERE: -1.50
SPECS_TYPE: PAL

## 2023-02-20 ASSESSMENT — CONF VISUAL FIELD
OD_SUPERIOR_TEMPORAL_RESTRICTION: 0
OS_INFERIOR_TEMPORAL_RESTRICTION: 0
OS_SUPERIOR_NASAL_RESTRICTION: 0
OD_NORMAL: 1
OD_INFERIOR_TEMPORAL_RESTRICTION: 0
OD_SUPERIOR_NASAL_RESTRICTION: 0
METHOD: COUNTING FINGERS
OS_SUPERIOR_TEMPORAL_RESTRICTION: 0
OS_NORMAL: 1
OD_INFERIOR_NASAL_RESTRICTION: 0
OS_INFERIOR_NASAL_RESTRICTION: 0

## 2023-02-20 ASSESSMENT — VISUAL ACUITY
OS_CC: 20/20-2
OS_CC+: -2
OD_SC+: +1
OD_CC: 20/30
CORRECTION_TYPE: GLASSES
OD_SC: 20/70
OS_SC: 20/80
OD_CC: 20/30-1
OS_CC: 20/30
METHOD: SNELLEN - LINEAR
OD_SC: 20/200
OS_SC: 20/80

## 2023-02-20 ASSESSMENT — REFRACTION_MANIFEST
OS_AXIS: 080
OD_AXIS: 103
OD_CYLINDER: +1.25
OS_SPHERE: -2.00
OS_ADD: +2.50
OS_CYLINDER: +1.00
OD_ADD: +2.50
OD_SPHERE: -1.50

## 2023-02-20 ASSESSMENT — EXTERNAL EXAM - RIGHT EYE: OD_EXAM: NORMAL

## 2023-02-20 ASSESSMENT — CUP TO DISC RATIO
OD_RATIO: 0.3
OS_RATIO: 0.25

## 2023-02-20 ASSESSMENT — TONOMETRY
OS_IOP_MMHG: 12
OD_IOP_MMHG: 12
IOP_METHOD: APPLANATION

## 2023-02-20 ASSESSMENT — SLIT LAMP EXAM - LIDS
COMMENTS: NORMAL
COMMENTS: NORMAL

## 2023-02-20 ASSESSMENT — EXTERNAL EXAM - LEFT EYE: OS_EXAM: NORMAL

## 2023-02-20 NOTE — LETTER
2/20/2023         RE: Yvonne Zarco  [address]  [place] [address]        Dear Colleague,    Thank you for referring your patient, Yvonne Zarco, to the [facility]. Please see a copy of my visit note below.    Chief Complaint   Patient presents with     Annual Eye Exam         Last Eye Exam: 10/2022 - [facility]   Dilated Previously: Yes, side effects of dilation explained today    What are you currently using to see?  Glasses - PAL's - she is not happy with the glasses she got from her appt at [facility] - don't work well for computer        Distance Vision Acuity: Satisfied with vision    Near Vision Acuity: Not satisfied - likes to have separate computer/reading glasses     Eye Comfort: good  Do you use eye drops? : No  Occupation or Hobbies: [employer]        Medical, surgical and family histories reviewed and updated 2/20/2023.       OBJECTIVE: See Ophthalmology exam    ASSESSMENT:    ICD-10-CM    1. Encounter for examination of eyes and vision with abnormal findings  Z01.01       2. S/P LASIK surgery  Z98.890       3. Myopia of both eyes  H52.13       4. Regular astigmatism of both eyes  H52.223       5. Presbyopia  H52.4           PLAN:     Patient Instructions   Yvonne was advised of today's exam findings.  Copy of updated glasses prescriptions provided today.    Return in 1 year for eye exam, or sooner if needed.    The effects of the dilating drops last for 4- 6 hours.  You will be more sensitive to light and vision will be blurry up close.  Mydriatic sunglasses were given if needed.       Jason Becker O.D.  [address] [place]  [address]    [phone]             Again, thank you for allowing me to participate in the care of your patient.        Sincerely,        Jason Becker OD

## 2023-02-20 NOTE — PATIENT INSTRUCTIONS
Yvonne was advised of today's exam findings.  Copy of updated glasses prescriptions provided today.    Return in 1 year for eye exam, or sooner if needed.    The effects of the dilating drops last for 4- 6 hours.  You will be more sensitive to light and vision will be blurry up close.  Mydriatic sunglasses were given if needed.       Jason Becker O.D.  [facility] [place]  [address] [place] [address]. [place]  [place]  [address]    [phone]

## 2023-02-20 NOTE — PROGRESS NOTES
Chief Complaint   Patient presents with     Annual Eye Exam         Last Eye Exam: 10/2022 - [facility]   Dilated Previously: Yes, side effects of dilation explained today    What are you currently using to see?  Glasses - PAL's - she is not happy with the glasses she got from her appt at [facility] - don't work well for computer        Distance Vision Acuity: Satisfied with vision    Near Vision Acuity: Not satisfied - likes to have separate computer/reading glasses     Eye Comfort: good  Do you use eye drops? : No  Occupation or Hobbies: [employer]        Medical, surgical and family histories reviewed and updated 2/20/2023.       OBJECTIVE: See Ophthalmology exam    ASSESSMENT:    ICD-10-CM    1. Encounter for examination of eyes and vision with abnormal findings  Z01.01       2. S/P LASIK surgery  Z98.890       3. Myopia of both eyes  H52.13       4. Regular astigmatism of both eyes  H52.223       5. Presbyopia  H52.4           PLAN:     Patient Instructions   Yvonne was advised of today's exam findings.  Copy of updated glasses prescriptions provided today.    Return in 1 year for eye exam, or sooner if needed.    The effects of the dilating drops last for 4- 6 hours.  You will be more sensitive to light and vision will be blurry up close.  Mydriatic sunglasses were given if needed.       Jason Becker O.D.  [address]  [address]    [phone]

## 2023-03-17 NOTE — TELEPHONE ENCOUNTER
"Spoke with patient about [employer] and [employer] paperwork that she sent (see previous [employer] message). Let her know that Dr. Morin has filled them out to the best of her ability with the info we have, however, since we have not seen her since 1/29/18, before her MRI, much of the info they are requesting is unknown without a follow-up visit. Patient stated that she feels there has been miscommunication and has been waiting for Dr. Morin to reach out to her about her MRI results and follow-up. Patient was told to follow-up in clinic after MRI per 1/29/18 AVS. MRI results and recommendations were given to patient via phone on 1/31/18 and was told to follow-up in clinic if she had questions about her MRI via [employer] message 1/31/18 as well.     Patient stated she has been off of work since 2/1/18 because she was told to stay of her leg and use crutches, but she has a \"mile long walk from the parking ramp to her work\" and has needed time to adjust to using the crutches for that distance. Other than a request for a sit-to-standing desk, which a letter was provided for, there have been no requests made for additional work restrictions or accommodations. Stated that she was originally to return to work on 2/12/18 but \"someone changed the date to 2/18/18\" and not sure who changed it.    Patient is scheduled for a follow-up visit with Dr. Morin on Monday 2/12/18 to review MRI results, treatment recommendations, work restrictions, and talk about [employer] and [employer] paperwork. Patient said that paperwork could wait to be filled out until that visit. No further questions at this time.    Jose D Roe, ATC    "
Statement Selected

## 2023-03-25 ENCOUNTER — HEALTH MAINTENANCE LETTER (OUTPATIENT)
Age: 48
End: 2023-03-25
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
## 2024-01-01 ENCOUNTER — HEALTH MAINTENANCE LETTER (OUTPATIENT)
Age: 49
End: 2024-01-01

## 2024-02-16 NOTE — LETTER
2018         RE: Yvonne Zarco  [address]  [facility]  [place] [address]        Dear Colleague,    Thank you for referring your patient, Yvonne Zarco, to the [facility] [place]. Please see a copy of my visit note below.    Sports Medicine Clinic Visit - Interim History 2018      PCP: Rima Mccollum    Yvonne Zarco is a 42 year old female who is seen in f/u up for Insufficiency fracture of left femur with routine healing, subsequent encounter. Since last visit on 18, patient has had and MRI and is here to review results. She has been non weight bearing on that leg for the past 1.5 weeks since results and reports good improvement.  Wearing a knee brace which gives her more stability.  Would also like to discuss work restrictions.  - Has not had specific bone health work up, is s/p bariatric surgery.    Social History: P desk job    Review of Systems  Skin: no bruising, initial swelling  Musculoskeletal: as above  Neurologic: no numbness, paresthesias  Remainder of review of systems is negative including constitutional, CV, pulmonary, GI, except as noted in HPI or medical history.    Patient's current problem list, past medical and surgical history, and family history were reviewed.    Patient Active Problem List   Diagnosis     GERD (gastroesophageal reflux disease)     Bariatric surgery status     Anxiety     Tobacco use disorder     Lumbago     Past Medical History:   Diagnosis Date     Depressive disorder forever     Endometriosis     found at time of abdominal hysterectomy     Gastro-oesophageal reflux disease      Hiatal hernia      Hypertension      Past Surgical History:   Procedure Laterality Date      SECTION      x2     CHOLECYSTECTOMY       ESOPHAGOSCOPY, GASTROSCOPY, DUODENOSCOPY (EGD), COMBINED  2012    Procedure: COMBINED ESOPHAGOSCOPY, GASTROSCOPY, DUODENOSCOPY (EGD), BIOPSY SINGLE OR MULTIPLE;;  Surgeon: Dorinda  Goal Outcome Evaluation:                   VS WNL. O2 >92% @4L BNC. Patient refuses to wear Bipap at night. Patient refuses to go to the bed and wants to stay in the recliner. IV antibiotics and steroids given. BLE are wrapped in Unna boots. WIll continue to monitor. SB/NSR 58-78                          "Scott Ortiz MD;  Location:  GI     GENITOURINARY SURGERY  ??    bladder sling with hysterectomy early      GYN SURGERY      tubal ligation     hysterectomy with bladder sling      left ovaries     HYSTERECTOMY, PAP NO LONGER INDICATED      Abdominal hyst     LAPAROSCOPIC GASTRIC SLEEVE  5/10/2013    Procedure: LAPAROSCOPIC GASTRIC SLEEVE;  Laparoscopic Sleeve Gastrectomy with hiatal hernia repair;  Surgeon: Scott Seth MD;  Location:  OR     LASIK BILATERAL       ORTHOPEDIC SURGERY      right foot surgery     Family History   Problem Relation Age of Onset     Hypertension Father           DIABETES Mother      diet controlled     Hypertension Mother      meds     Coronary Artery Disease Mother 67     quadruple bypass     Hypertension Sister      Anxiety Disorder Sister      Anxiety Disorder Daughter      Anxiety Disorder Niece      Hypertension Sister      meds     Anxiety Disorder Sister      CANCER No family hx of      CEREBROVASCULAR DISEASE No family hx of      Thyroid Disease No family hx of      Glaucoma No family hx of      Macular Degeneration No family hx of        Objective  /62 (BP Location: Left arm, Patient Position: Sitting, Cuff Size: Adult Large)  Ht 5' 1\" (1.549 m)  Wt 150 lb (68 kg)  BMI 28.34 kg/m2    GENERAL APPEARANCE: healthy, alert and no distress   GAIT: antalgic and crutches  SKIN: no suspicious lesions or rashes  HEENT: Sclera clear, anicteric  CV: good peripheral pulses  RESP: Breathing not labored  NEURO: Normal strength and tone, mentation intact and speech normal  PSYCH:  mentation appears normal and affect normal/bright    Bilateral Knee exam  Inspection:      no bruising, no swelling      Patella:      Normal patellar tracking noted through range of motion bilateral      Tender:      mild lateral knee      Non Tender:      remainder of knee area bilateral      Knee ROM:      Slightly decreased flexion of left knee compared to " right      Strength:      5/5 with knee extension bilateral      Special Tests:     neg (-) Kaila left       neg (-) Lachmans left       neg (-) anterior drawer left       neg (-) posterior drawer left       neg (-) varus at 0 deg and 30 deg left       neg (-) valgus at 0 deg and 30 deg left      Neurovascular:      2+ peripheral pulses bilaterally and brisk capillary refill       sensation grossly intact    Radiology  I ordered, visualized and reviewed these images with the patient  MR KNEE LEFT WITHOUT CONTRAST 1/30/2018 7:32 PM     HISTORY: Injury of left knee, initial encounter, knee pain.      TECHNIQUE:  Axial and coronal T2 with fat suppression. Coronal T1.  Sagittal dual echo T2.     FINDINGS:   Medial Meniscus: No tear, displaced fragment, or extrusion.         Lateral Meniscus: No tear, displaced fragment, or extrusion.         Anterior Cruciate Ligament: Unremarkable. No sprain or tear  identified.      Posterior Cruciate Ligament: Unremarkable. No sprain or tear  identified.      Medial Collateral Ligament: No sprain or tear identified.     Lateral Collateral Ligament Complex, Popliteus Tendon: The iliotibial  band, fibular collateral ligament, biceps femoris tendon, and  popliteus tendon are intact.     Osseous and Cartilaginous Structures: Prominent bone marrow edema of  the lateral femoral condyle. I suspect this relates to a subtle  subchondral insufficiency fracture, which is questioned on series 3  image 20. The overall pattern is more suggestive of an insufficiency  fracture, versus an isolated bone contusion. No chondromalacia  identified.     Extensor Mechanism: The quadriceps and patellar tendons are intact.  The medial and lateral patellar retinacula appear unremarkable.     Joint Space: Mild joint effusion. No definite loose bodies  appreciated.     Additional Findings: No Baker's cyst.  No semimembranosus-tibial  collateral ligament or pes anserine bursitis. Nonspecific soft  tissue  edema.    IMPRESSION:  1. Prominent lateral femoral condyle edema. This is felt to most  likely relate to subtle underlying subchondral insufficiency fracture.  2. Mild effusion.     DK CRUM MD    Assessment:  1. Insufficiency fracture of left femur with routine healing, subsequent encounter      Unclear if fracture is from initial injury, subsequent injuries or altered gait given prior injuries - initial exam was without knee tenderness.  Discussed typical treatment including non weight bearing until pain free then gradual weight bearing as tolerated.  Brace can help stabilize knee as well.  Potential role for PT in the future discussed.  Follow up in 2 weeks with x-rays.  - Follow up with PCP to discuss bone health and if further work up necessary.    Plan:  - Today's Plan of Care:  Work Restrictions  Teach crutch walking  Short term disability form  Fitness-for-duty form  Continue non-weightbearing  Follow-up with PCP to review bone health    Follow Up: 2 weeks in clinic with 2 view left knee xrays    Concerning signs and symptoms were reviewed.  The patient expressed understanding of this management plan and all questions were answered at this time.    Maritza Morin MD [facility]  Primary Care Sports Medicine  [facility]    Again, thank you for allowing me to participate in the care of your patient.        Sincerely,        Maritza Morin MD

## (undated) DEVICE — LINEN TOWEL PACK X5 5464

## (undated) DEVICE — SUCTION TIP YANKAUER W/O VENT K86

## (undated) DEVICE — GLOVE PROTEXIS W/NEU-THERA 6.5  2D73TE65

## (undated) DEVICE — SU MONOCRYL 4-0 PS-2 18" UND Y496G

## (undated) DEVICE — PAD PERI INDIV WRAP 11" 2022A

## (undated) DEVICE — ENDO DISSECTOR BLUNT 05MM 3/PK 173019

## (undated) DEVICE — SUCTION MANIFOLD DORNOCH ULTRA CART UL-CL500

## (undated) DEVICE — ENDO TROCAR FIRST ENTRY KII FIOS ADV FIX 12X100MM CFF73

## (undated) DEVICE — ESU LIGASURE LAPAROSCOPIC BLUNT TIP SEALER 5MMX37CM LF1837

## (undated) DEVICE — ESU GROUND PAD UNIVERSAL W/O CORD

## (undated) DEVICE — SYR 50ML LL W/O NDL 309653

## (undated) DEVICE — SOL WATER IRRIG 1000ML BOTTLE 2F7114

## (undated) DEVICE — TUBING C02 INSUFFLATION LAP FILTER HEATER 6198

## (undated) DEVICE — JELLY LUBRICATING SURGILUBE 2OZ TUBE 0281-0205-02

## (undated) DEVICE — STRAP KNEE/BODY 31143004

## (undated) DEVICE — DEVICE SUTURE GRASPER TROCAR CLOSURE 14GA PMITCSG

## (undated) DEVICE — Device

## (undated) DEVICE — SYR 20ML LL W/O NDL 302830

## (undated) DEVICE — LINEN GOWN X4 5410

## (undated) DEVICE — PANTIES MESH LG/XLG 2PK 706M2

## (undated) DEVICE — ENDO POUCH UNIVERSAL RETRIEVAL SYSTEM INZII 5MM CD003

## (undated) DEVICE — TUBING SMOKE EVAC 1CMX3M SEA3710

## (undated) DEVICE — NDL 18GA 1.5" 305196

## (undated) DEVICE — ENDO TROCAR FIRST ENTRY KII FIOS ADV FIX 05X100MM CFF03

## (undated) DEVICE — SU VICRYL 0 CT-2 27" J334H

## (undated) DEVICE — SOL NACL 0.9% IRRIG 1000ML BOTTLE 2F7124

## (undated) DEVICE — GLOVE PROTEXIS BLUE W/NEU-THERA 7.0  2D73EB70

## (undated) DEVICE — LINEN POUCH DBL 5427

## (undated) DEVICE — SUCTION IRR STRYKERFLOW II W/TIP 250-070-520

## (undated) DEVICE — DRAPE LAP W/ARMBOARD 29410

## (undated) DEVICE — CATH TRAY FOLEY SURESTEP 16FR WDRAIN BAG STLK LATEX A300316A

## (undated) DEVICE — NDL INSUFFLATION 14GA STEP S100000

## (undated) DEVICE — PAD CHUX UNDERPAD 30X30"

## (undated) DEVICE — WIPES FOLEY CARE SURESTEP PROVON DFC100

## (undated) RX ORDER — OXYCODONE HYDROCHLORIDE 5 MG/1
TABLET ORAL
Status: DISPENSED
Start: 2018-05-31

## (undated) RX ORDER — FENTANYL CITRATE 50 UG/ML
INJECTION, SOLUTION INTRAMUSCULAR; INTRAVENOUS
Status: DISPENSED
Start: 2018-05-31

## (undated) RX ORDER — PHENAZOPYRIDINE HYDROCHLORIDE 200 MG/1
TABLET, FILM COATED ORAL
Status: DISPENSED
Start: 2018-05-31

## (undated) RX ORDER — ACETAMINOPHEN 325 MG/1
TABLET ORAL
Status: DISPENSED
Start: 2018-05-31

## (undated) RX ORDER — ONDANSETRON 2 MG/ML
INJECTION INTRAMUSCULAR; INTRAVENOUS
Status: DISPENSED
Start: 2018-05-31

## (undated) RX ORDER — PROPOFOL 10 MG/ML
INJECTION, EMULSION INTRAVENOUS
Status: DISPENSED
Start: 2018-05-31

## (undated) RX ORDER — DEXAMETHASONE SODIUM PHOSPHATE 4 MG/ML
INJECTION, SOLUTION INTRA-ARTICULAR; INTRALESIONAL; INTRAMUSCULAR; INTRAVENOUS; SOFT TISSUE
Status: DISPENSED
Start: 2018-05-31

## (undated) RX ORDER — HYDROMORPHONE HYDROCHLORIDE 1 MG/ML
INJECTION, SOLUTION INTRAMUSCULAR; INTRAVENOUS; SUBCUTANEOUS
Status: DISPENSED
Start: 2018-05-31

## (undated) RX ORDER — SCOLOPAMINE TRANSDERMAL SYSTEM 1 MG/1
PATCH, EXTENDED RELEASE TRANSDERMAL
Status: DISPENSED
Start: 2018-05-31